# Patient Record
Sex: MALE | Race: WHITE | NOT HISPANIC OR LATINO | Employment: FULL TIME | URBAN - METROPOLITAN AREA
[De-identification: names, ages, dates, MRNs, and addresses within clinical notes are randomized per-mention and may not be internally consistent; named-entity substitution may affect disease eponyms.]

---

## 2018-01-08 ENCOUNTER — TELEPHONE (OUTPATIENT)
Dept: INTERNAL MEDICINE | Facility: CLINIC | Age: 43
End: 2018-01-08

## 2018-01-08 RX ORDER — OSELTAMIVIR PHOSPHATE 75 MG/1
75 CAPSULE ORAL DAILY
Qty: 10 CAPSULE | Refills: 0 | Status: SHIPPED | OUTPATIENT
Start: 2018-01-08 | End: 2018-06-25

## 2018-01-08 NOTE — TELEPHONE ENCOUNTER
Per Dr. Weber, I sent in Tamiflu 75 mg, once daily x 10 days.  LM on VM below.    ----- Message from Mae Gentile MA sent at 1/8/2018 11:10 AM EST -----  Regarding: FW: SCRIPT OR COME IN      ----- Message -----     From: Jaqui Henderson     Sent: 1/8/2018   9:33 AM       To: Elpidio Gonzales [facility]  Subject: SCRIPT OR COME IN                                EMILIE    Different message sent about pt's daughter and school note.    At last Tuesday's visit patient says Dr. Chin asked if anyone in the house had symptoms of same.  At that time, no, but now patient awakens today with diarrhea and vomiting x 3 times with body aches.    He thought she was going to call something in to pharmacy if others had symptoms, unless we think he needs to come in and be seen now.  Patient uses [facility] in [place]    [phone]

## 2018-06-25 ENCOUNTER — OFFICE VISIT (OUTPATIENT)
Dept: INTERNAL MEDICINE | Facility: CLINIC | Age: 43
End: 2018-06-25

## 2018-06-25 VITALS
SYSTOLIC BLOOD PRESSURE: 132 MMHG | OXYGEN SATURATION: 99 % | TEMPERATURE: 98.2 F | HEART RATE: 73 BPM | BODY MASS INDEX: 32.34 KG/M2 | DIASTOLIC BLOOD PRESSURE: 80 MMHG | RESPIRATION RATE: 16 BRPM | HEIGHT: 73 IN | WEIGHT: 244 LBS

## 2018-06-25 DIAGNOSIS — K21.00 GASTROESOPHAGEAL REFLUX DISEASE WITH ESOPHAGITIS: ICD-10-CM

## 2018-06-25 DIAGNOSIS — J45.21 MILD INTERMITTENT ASTHMA WITH ACUTE EXACERBATION: Primary | ICD-10-CM

## 2018-06-25 PROBLEM — J45.20 MILD INTERMITTENT ASTHMA WITHOUT COMPLICATION: Status: ACTIVE | Noted: 2018-06-25

## 2018-06-25 PROCEDURE — 94010 BREATHING CAPACITY TEST: CPT | Performed by: NURSE PRACTITIONER

## 2018-06-25 PROCEDURE — 94664 DEMO&/EVAL PT USE INHALER: CPT | Performed by: NURSE PRACTITIONER

## 2018-06-25 PROCEDURE — 99214 OFFICE O/P EST MOD 30 MIN: CPT | Performed by: NURSE PRACTITIONER

## 2018-06-25 RX ORDER — OMEPRAZOLE 40 MG/1
40 CAPSULE, DELAYED RELEASE ORAL DAILY
Qty: 30 CAPSULE | Refills: 0 | Status: SHIPPED | OUTPATIENT
Start: 2018-06-25 | End: 2018-07-16

## 2018-06-25 RX ORDER — ALBUTEROL SULFATE 2.5 MG/3ML
2.5 SOLUTION RESPIRATORY (INHALATION) ONCE
Status: COMPLETED | OUTPATIENT
Start: 2018-06-25 | End: 2018-06-25

## 2018-06-25 RX ORDER — MONTELUKAST SODIUM 10 MG/1
10 TABLET ORAL NIGHTLY
Qty: 30 TABLET | Refills: 6 | Status: SHIPPED | OUTPATIENT
Start: 2018-06-25 | End: 2019-03-06

## 2018-06-25 RX ORDER — MONTELUKAST SODIUM 10 MG/1
10 TABLET ORAL NIGHTLY
Status: CANCELLED | OUTPATIENT
Start: 2018-06-25

## 2018-06-25 RX ADMIN — ALBUTEROL SULFATE 2.5 MG: 2.5 SOLUTION RESPIRATORY (INHALATION) at 11:24

## 2018-06-25 NOTE — PATIENT INSTRUCTIONS
Asthma, Adult  Asthma is a recurring condition in which the airways tighten and narrow. Asthma can make it difficult to breathe. It can cause coughing, wheezing, and shortness of breath. Asthma episodes, also called asthma attacks, range from minor to life-threatening. Asthma cannot be cured, but medicines and lifestyle changes can help control it.  What are the causes?  Asthma is believed to be caused by inherited (genetic) and environmental factors, but its exact cause is unknown. Asthma may be triggered by allergens, lung infections, or irritants in the air. Asthma triggers are different for each person. Common triggers include:  · Animal dander.  · Dust mites.  · Cockroaches.  · Pollen from trees or grass.  · Mold.  · Smoke.  · Air pollutants such as dust, household , hair sprays, aerosol sprays, paint fumes, strong chemicals, or strong odors.  · Cold air, weather changes, and winds (which increase molds and pollens in the air).  · Strong emotional expressions such as crying or laughing hard.  · Stress.  · Certain medicines (such as aspirin) or types of drugs (such as beta-blockers).  · Sulfites in foods and drinks. Foods and drinks that may contain sulfites include dried fruit, potato chips, and sparkling grape juice.  · Infections or inflammatory conditions such as the flu, a cold, or an inflammation of the nasal membranes (rhinitis).  · Gastroesophageal reflux disease (GERD).  · Exercise or strenuous activity.    What are the signs or symptoms?  Symptoms may occur immediately after asthma is triggered or many hours later. Symptoms include:  · Wheezing.  · Excessive nighttime or early morning coughing.  · Frequent or severe coughing with a common cold.  · Chest tightness.  · Shortness of breath.    How is this diagnosed?  The diagnosis of asthma is made by a review of your medical history and a physical exam. Tests may also be performed. These may include:  · Lung function studies. These tests show how  much air you breathe in and out.  · Allergy tests.  · Imaging tests such as X-rays.    How is this treated?  Asthma cannot be cured, but it can usually be controlled. Treatment involves identifying and avoiding your asthma triggers. It also involves medicines. There are 2 classes of medicine used for asthma treatment:  · Controller medicines. These prevent asthma symptoms from occurring. They are usually taken every day.  · Reliever or rescue medicines. These quickly relieve asthma symptoms. They are used as needed and provide short-term relief.    Your health care provider will help you create an asthma action plan. An asthma action plan is a written plan for managing and treating your asthma attacks. It includes a list of your asthma triggers and how they may be avoided. It also includes information on when medicines should be taken and when their dosage should be changed. An action plan may also involve the use of a device called a peak flow meter. A peak flow meter measures how well the lungs are working. It helps you monitor your condition.  Follow these instructions at home:  · Take medicines only as directed by your health care provider. Speak with your health care provider if you have questions about how or when to take the medicines.  · Use a peak flow meter as directed by your health care provider. Record and keep track of readings.  · Understand and use the action plan to help minimize or stop an asthma attack without needing to seek medical care.  · Control your home environment in the following ways to help prevent asthma attacks:  ? Do not smoke. Avoid being exposed to secondhand smoke.  ? Change your heating and air conditioning filter regularly.  ? Limit your use of fireplaces and wood stoves.  ? Get rid of pests (such as roaches and mice) and their droppings.  ? Throw away plants if you see mold on them.  ? Clean your floors and dust regularly. Use unscented cleaning products.  ? Try to have someone  else vacuum for you regularly. Stay out of rooms while they are being vacuumed and for a short while afterward. If you vacuum, use a dust mask from a hardware store, a double-layered or microfilter vacuum  bag, or a vacuum  with a HEPA filter.  ? Replace carpet with wood, tile, or vinyl sukhjinder. Carpet can trap dander and dust.  ? Use allergy-proof pillows, mattress covers, and box spring covers.  ? Wash bed sheets and blankets every week in hot water and dry them in a dryer.  ? Use blankets that are made of polyester or cotton.  ? Clean bathrooms and dominga with bleach. If possible, have someone repaint the walls in these rooms with mold-resistant paint. Keep out of the rooms that are being cleaned and painted.  ? Wash hands frequently.  Contact a health care provider if:  · You have wheezing, shortness of breath, or a cough even if taking medicine to prevent attacks.  · The colored mucus you cough up (sputum) is thicker than usual.  · Your sputum changes from clear or white to yellow, green, gray, or bloody.  · You have any problems that may be related to the medicines you are taking (such as a rash, itching, swelling, or trouble breathing).  · You are using a reliever medicine more than 2-3 times per week.  · Your peak flow is still at 50-79% of your personal best after following your action plan for 1 hour.  · You have a fever.  Get help right away if:  · You seem to be getting worse and are unresponsive to treatment during an asthma attack.  · You are short of breath even at rest.  · You get short of breath when doing very little physical activity.  · You have difficulty eating, drinking, or talking due to asthma symptoms.  · You develop chest pain.  · You develop a fast heartbeat.  · You have a bluish color to your lips or fingernails.  · You are light-headed, dizzy, or faint.  · Your peak flow is less than 50% of your personal best.  This information is not intended to replace advice given to  you by your health care provider. Make sure you discuss any questions you have with your health care provider.  Document Released: 12/18/2006 Document Revised: 05/31/2017 Document Reviewed: 07/17/2014  Elsevier Interactive Patient Education © 2017 [employer]  ??????????????????? ?????????? ??????? ? ????????  Gastroesophageal Reflux Disease, Adult  ? ????? ???? ???????? ?? ???????? ? ???????? ? ???????, ??? ? ??????????????. ??????, ???? ??????? ???????? ??????????????????? ?????????? ???????? (????), ???? ? ?????????? ??????? ????????? ??????? ? ???????. ????? ??? ??????????, ??????? ?????????? ??????????? ? ???????????. ?? ???????? ???? ????? ???????? ? ??????????? ????????? ????????? (???) ? ???????? ????????.  ?????? ????????  ??? ????????? ??????????? ?????????? ?? ??????? ????? ????? ????????? ? ???????? (??????? ??????????? ?????????, ???). ? ????? ????? ??? ????????? ??????? ???????? ????? ????, ??? ???? ?? ???? ???????? ? ???????. ????? ??? ???????? ??? ??????????, ?? ?? ????????? ??????? ??????? ???????, ??? ????????? ???? ? ?????????? ??????? ???????????? ? ???????. ??? ????? ???? ???????? ??? ???????????? ????????? ??????? ???????, ????????????? ?????????? ? ??????????? ?????????, ? ??? ?????:  · ???????????? ??????.  · ????????????.  · ????? ??????????? ????????? ?????????.  · ??????????????? ?????????.  · ????????? ??????? ???????? ? ???????, ????? ??? ????, ???????, ??? ? ????.    ??? ???????? ???? ???????? ????? ??????????  ? ???????? ????? ????????? ????? ???????:  · ???? ? ?????????? ?????? ????.  · ???? ? ????????????? ?????????????? ?????.  · ????, ???????????? ????????? ?????? ???? (???????????? ????????????????????? ???????).    ?????? ???????? ??? ?????????  ???????? ????? ????????? ???????? ?????????:  · ??????.  · ???????????? ??? ??????????? ????????.  · ???????? ????? ? ?????.  · ??????? ?????? ?? ???.  · ?????? ????? ??? ???.  · ??????? ???????? ?????????? ?????.  · ???????  ???????????? ??? ??????? ??????.  · ???????.  · ???? ? ?????.  · ?????? ??? ????????? ???????.  · ?????????????? (???????????) ?????? ??? ?????? ? ?????? ?????.  · ??????????? ?????? ?????.  · ???????? ????.    ???? ? ????? ????? ?????????? ??? ????????? ??????????. ???? ? ??? ????????? ???? ? ?????, ??????????? ?????????? ? ?????? ???????? ?????.  ??? ?????? ??????? ????? ??????????  ??? ??????? ???? ??????? ??????? ? ???????? ??????????? ??????. ????? ??????????, ????????? ? ??? ???? ? ?????? ??? ??????? ?????, ??? ??????? ???? ????? ????? ?????????, ??? ?? ?????????? ?? ???????. ??? ????? ????? ????????? ?????? ????????????, ? ??? ?????:  · ??????????, ?????  ??????????? ??????? ? ??????? ? ??????? ????????? (?????)??????.  · ?????? ???  ????????? ?????? ??????????? ? ????????.  · ?????? ??? ????????? ?????? ???????? ? ????????.  · ???? ? ?????????????? ????? (??????? ??? ????????????????), ????? ?????????? ?????, ?????? ? ???????????????? ????????.    ??? ????? ??? ??????????  ????? ??????? ???????? ?????????? ????????? ? ?????????????? ?????????? ????? ???????????. ??????? ????? ????????? ????? ??????????? ? ??????????? ?? ??????????? ????????? ?????????. ??? ???? ????? ???????????????:  · ????????? ??????? ???????.  · ????? ????????.  · ????????????? ?????????????.    ? ???????? ???????? ???????? ????? ???????????:  ?????  · ??????????????? ?????, ??????? ????????????? ????? ??????? ??????. ??? ????? ???? ?????????? ????????? ????????? ? ????????, ????? ???:  ? ???? ? ??? (? ???????? ??? ??? ???????).  ? ???????, ?????????? ????????.  ? ?????????????? ? ?????????? ???????.  ? ???????????? ??????? ??? ???????????? ????.  ? ??????? ? ?????.  ? ???????? ??????? ???????? ???? ? ?????? ???????? ???????.  ? ?????? ? ???.  ? ????.  ? ?????? ? ?????? ????????, ? ??? ????? ???? ?????, ??????? ???? ?????, ??????? ?????, ?????, ?????? ????? ? ???? ???????.  ? ???? ?????????? ? ?????????? ??????, ????? ??? ?????????,  ?????? ? ?????.  ? ???????? ?? ???????? ??????, ????? ??? ??????? ????, ????, ?????? ? ????? ? ??????? ??????.  ? ??????? ? ?????? ????????, ?????, ??? ???????, ????????? ???, ???????????? ????? ? ???????? ? ??????? ??????????? ????.  ? ?????? ???????? ? ??????? ??????????? ?????, ????? ??? ???-???? ? ?????? ????? ???????? ? ?????? ????, ???????? ????????, ???????, ??????? ? ?????.  ? ???????? ???????? ? ??????? ??????????? ????, ????? ??? ??????? ??????, ????????? ????? ? ????????? ???.  · ????????? ????? ?????????? ???????? ?????? ?????????? ??????? ??????? ????.  · ?????????? ?? ???? ????? ???????? ?? ????? ???.  · ????????? ???????????? ???? ? ??????? 2-3 ????? ????? ????.  · ?? ???????? ????? ????? ???.  · He ??????? ?????????? ? ?????????? ??????????? ????? ????? ???.  ????? ????????  · ????????? ???????? ?? ????? ????????? ????? ?????????.  · ?????????? ?????????????? ? ??????????? ????????? ?????? ??? ??????? ????? ??????? ??????. He ??????? ????????? ???????, ????????? ??? ?????? ????, ???? ?????? ?? ?? ???????? ??? ??????? ????.  · He ??????? ??????????? ?????-???? ???????? ???????, ? ??? ????? ????????, ??????????? ????? ? ??????????? ????????. ???? ??? ?????? ?? ??????? ??? ????? ??????, ?????????? ? ?????? ???????? ?????.  · ?????? ????????? ??????. He ??????? ?????? ??????, ??????? ?????? ?????????? ?????, ????????? ??? ?????.  · ????????? (????????????) ?????? ????? ??????? ?? 6 ?????? (15 ??).  · ?????????? ??????? ??????? ???????, ???????? ????????? ????? ??? ??????????. ???? ??? ????? ?????? ? ???????? ?????? ???????, ?????????? ? ?????? ?????.  · ???? ? ??? ?????????? ???, ??????? ??? ?? ????????? ??? ??? ??????. ????????? ?????? ???????? ????? ?????????????????? ??? ?? ??????????? ???????? ???????? ???? ????.  · ????????? ?? ??? ??????????? ?????? ??? ??????? ????? ??????? ??????. ??? ?????.  ?????????? ? ???????? ?????, ????:  · ? ??? ????????? ????? ????????.  · ? ??? ???????????? ????????  ????.  · ??? ?????? ??? ?????? ???????.  · ? ??? ?????????? ?????????? ?????? ??? ????????? ???????.  · ???????? ?? ???????, ???????? ? ??? ?? ????????.  · ? ??? ?????????? ??????? ?????.  ?????????? ?????????? ?? ???????, ????:  · ? ??? ?????????? ???? ? ?????, ???, ???????, ????? ??? ? ?????.  · ?? ?????????? ??????????, ?????????????? ??? ?????????????? ?????????.  · ? ??? ???????? ???? ? ????? ??? ??????.  · ? ??? ???????? ????? ? ?????? ??? ??????? ????? ????????, ??? ??????? ????.  · ?? ??????? ????????.  · ? ??? ?????????? ???????? ??? ?????? ????.  · ?? ?? ?????? ???????, ???? ??? ????.  ??? ?????????? ?? ????? ???????? ??????, ??????????????? ????? ??????. ??????????? ???????? ????? ???????????? ??? ??????? ? ????? ??????? ??????.  Document Released: 09/27/2006 Document Revised: 04/18/2018 Document Reviewed: 04/13/2016  Elsevier Interactive Patient Education © 2018 [employer] Inc[employer]

## 2018-06-25 NOTE — PROGRESS NOTES
"Chief Complaint   Patient presents with   • Vomiting     X1 WK   • Cough     X1 MO   • Nasal Congestion       Subjective   Arnav Celaya is a 43 y.o. male is being seen for an acute appointment for cough and indigestion. He reports that about 6 weeks ago, he started with a cough after smoking a cigarette. He describes the cough as a dry to wet cough of clear phlegm. Coughs to the point of vomiting. Worse at night and with exertion. Associated chest tightness. Denies fevers or wheezing. He was diagnosied with exercise induced asthma. His last allergy testing was 15 years ago, and he has \"a lot\" of allergies. He had Immunotherapy from ages [age].     He admits to poor diet and eating \"too much spicy food\". He has had increasing burning in throat and chest for the past few weeks. He just cut out spicy foods for the past 3 days dur to above symptoms.     History of Present Illness     Current Outpatient Prescriptions on File Prior to Visit   Medication Sig Dispense Refill   • naproxen (NAPROSYN) 500 MG tablet Take 500 mg by mouth 2 (two) times a day  .     • [DISCONTINUED] oseltamivir (TAMIFLU) 75 MG capsule Take 1 capsule by mouth Daily. 10 capsule 0   • [DISCONTINUED] Pseudoeph-Doxylamine-DM-APAP (NYQUIL PO) Take  by mouth.     • [DISCONTINUED] Pseudoephedrine-APAP-DM (DAYQUIL PO) Take  by mouth.       No current facility-administered medications on file prior to visit.        The following portions of the patient's history were reviewed and updated as appropriate: allergies, current medications, past family history, past medical history, past social history, past surgical history and problem list.    Review of Systems   HENT: Positive for congestion, postnasal drip, rhinorrhea, sinus pain and sneezing. Negative for sore throat.    Eyes: Positive for itching.   Respiratory: Positive for cough and shortness of breath. Negative for wheezing.    Cardiovascular: Negative.    Gastrointestinal: Positive for vomiting. " Negative for abdominal distention, diarrhea and nausea. Abdominal pain: reflux.   Endocrine: Negative.    Genitourinary: Negative.    Musculoskeletal: Positive for arthralgias.   Allergic/Immunologic: Positive for environmental allergies.   Neurological: Negative.    Hematological: Negative.        Objective   Physical Exam   Constitutional: He is oriented to person, place, and time. He appears well-developed and well-nourished.   HENT:   Head: Normocephalic.   Right Ear: External ear normal.   Left Ear: External ear normal.   Nose: Mucosal edema and rhinorrhea present. Right sinus exhibits no maxillary sinus tenderness and no frontal sinus tenderness. Left sinus exhibits no maxillary sinus tenderness and no frontal sinus tenderness.   Mouth/Throat: Oropharynx is clear and moist.   Neck: Neck supple. No thyromegaly present.   Cardiovascular: Normal rate, regular rhythm and normal heart sounds.    No murmur heard.  Pulmonary/Chest: Effort normal and breath sounds normal. Respiratory distress: dry cough.   Abdominal: Soft. Bowel sounds are normal. He exhibits no distension. There is no tenderness.   Musculoskeletal: He exhibits no edema.   Neurological: He is alert and oriented to person, place, and time.   Skin: Skin is warm and dry.   Vitals reviewed.      Assessment/Plan   Arnav was seen today for vomiting, cough and nasal congestion.    Diagnoses and all orders for this visit:    Mild intermittent asthma with acute exacerbation  -     mometasone-formoterol (DULERA) 200-5 MCG/ACT inhaler; Inhale 2 puffs 2 (Two) Times a Day.  -     montelukast (SINGULAIR) 10 MG tablet; Take 1 tablet by mouth Every Night.  -     albuterol (PROVENTIL) nebulizer solution 0.083% 2.5 mg/3mL; Take 2.5 mg by nebulization 1 (One) Time.    Gastroesophageal reflux disease with esophagitis  -     omeprazole (priLOSEC) 40 MG capsule; Take 1 capsule by mouth Daily.    Other orders  -     Cancel: montelukast (SINGULAIR) 10 MG tablet; Take 1  tablet by mouth Every Night.    Spirometry done in office with Fev1 72%. Discussed Asthma triggers and GERD relationship. Dulera demo complete with first dose in office with albuterol neb. Re-assessed after neb and cough had resolved.       Follow up in 3 weeks  To repeat spirometry and taper off some medicaiton.

## 2018-07-03 ENCOUNTER — TELEPHONE (OUTPATIENT)
Dept: INTERNAL MEDICINE | Facility: CLINIC | Age: 43
End: 2018-07-03

## 2018-07-03 NOTE — TELEPHONE ENCOUNTER
Patient notified on 6/28/18, he has been scheduled for Tuesday, 7/3/18 to follow up with Dr. Weber.   ----- Message from EUGENIO Barton sent at 6/27/2018  1:37 PM EDT -----  Regarding: FW: MEDICATION REACTION  Contact: [phone]  I want him to stay on Dulera and Singulair. Hold any additional stomach medications. Avoid salt (MECHE diet) and caffeine this week. Keep a BP log for the next week, and make a follow up to discuss with Dr. Weber.     Place Omeprazole as an allergy.     ----- Message -----  From: Michela Emanuel MA  Sent: 6/27/2018  12:21 PM  To: EUGENIO Barton  Subject: FW: MEDICATION REACTION                          Message below was a little confusing, I called patient, he states that he saw [facility] on Monday and was given Dulera, Omeprazole, and Singulair when he was seen. He took all meds on Monday and was fine. Tuesday he had breakfast and took Omeprazole and Dulera only at around 0830. He says about an hour and a half later he broke out with big whelps on his face that went to his head and became very itchy. He took a shower and went [place], he says by that time he still had whelps but itchiness wasn't so bad. He says that he has had Dulera in the past as well as Singulair, but not omeprazole. His BP was 166/110 and that's what the physician [facility] was worried about. He told him that sometimes inhalers can make bp high. They want him to come back to have it taken for the next five days. They also gave him Ranitidine to take in place of Omeprazole. He says that he has not started it yet, he was a little wary of taking it and would like a little direction first. He was on his way to get his bp taken at clinic [facility]. He will call me back to let me know what it is.   ----- Message -----  From: Michela Emanuel MA  Sent: 6/27/2018  11:33 AM  To: Michela Emanuel MA  Subject: FW: MEDICATION REACTION                              ----- Message -----  From: Gely Villanueva  Sent: 6/27/2018  11:01 AM  To:  Elpidio Gonzales2 [facility] Pool  Subject: MEDICATION REACTION                              [facility] PT - SAW JULIO ON Monday    Patient called to say that he took the new meds that she prescribed,         omeprazole (priLOSEC) 40 MG capsule; Take 1 capsule by mouth Daily and      mometasone-formoterol (DULERA) 200-5 MCG/ACT inhaler; Inhale 2 puffs 2 (Two) Times a Day     Yesterday morning, he took these 2 meds,  and 1 1/2 hours later he was covered in hives. He is at [place], he went to the clinic [facility] and they told him to stop taking the omeprazole (priLOSEC) 40 MG capsule;, .and they gave him montelukast to take instead. His bp was 166/110, they did get it to only come down to 146/104. The clinic is having him come to them every day for 5 days so that they can keep track of his BP. Please check his meds.  He is asking that someone from here call him so that he can discuss this. He is afraid to use the inhaler, or to take anything.    Thanks!  yadira

## 2018-07-16 ENCOUNTER — OFFICE VISIT (OUTPATIENT)
Dept: INTERNAL MEDICINE | Facility: CLINIC | Age: 43
End: 2018-07-16

## 2018-07-16 VITALS
RESPIRATION RATE: 16 BRPM | OXYGEN SATURATION: 100 % | HEIGHT: 73 IN | TEMPERATURE: 97.3 F | HEART RATE: 72 BPM | BODY MASS INDEX: 33.13 KG/M2 | WEIGHT: 250 LBS | DIASTOLIC BLOOD PRESSURE: 90 MMHG | SYSTOLIC BLOOD PRESSURE: 138 MMHG

## 2018-07-16 DIAGNOSIS — R03.0 ELEVATED BLOOD PRESSURE READING IN OFFICE WITHOUT DIAGNOSIS OF HYPERTENSION: Primary | ICD-10-CM

## 2018-07-16 DIAGNOSIS — J45.20 MILD INTERMITTENT ASTHMA WITHOUT COMPLICATION: ICD-10-CM

## 2018-07-16 DIAGNOSIS — K21.00 GASTROESOPHAGEAL REFLUX DISEASE WITH ESOPHAGITIS: ICD-10-CM

## 2018-07-16 DIAGNOSIS — Z48.02 ENCOUNTER FOR REMOVAL OF SUTURES: ICD-10-CM

## 2018-07-16 PROCEDURE — 94010 BREATHING CAPACITY TEST: CPT | Performed by: NURSE PRACTITIONER

## 2018-07-16 PROCEDURE — 99214 OFFICE O/P EST MOD 30 MIN: CPT | Performed by: NURSE PRACTITIONER

## 2018-07-16 RX ORDER — DOXYCYCLINE 100 MG/1
CAPSULE ORAL
Refills: 0 | COMMUNITY
Start: 2018-07-07 | End: 2018-07-16

## 2018-07-16 NOTE — PROGRESS NOTES
Procedure   Suture Removal  Date/Time: 7/16/2018 12:35 PM  Performed by: JULIO OROZCO  Authorized by: JULIO OROZCO   Consent: Verbal consent obtained.  Risks and benefits: risks, benefits and alternatives were discussed  Consent given by: patient  Patient understanding: patient states understanding of the procedure being performed  Patient consent: the patient's understanding of the procedure matches consent given  Procedure consent: procedure consent matches procedure scheduled  Patient identity confirmed: verbally with patient  Location: Left hand 4th and 5th digits.  Wound Appearance: clean  Sutures Removed: 16  Post-removal: dressing applied and antibiotic ointment applied  Patient tolerance: Patient tolerated the procedure well with no immediate complications

## 2018-07-16 NOTE — PATIENT INSTRUCTIONS
"DASH Eating Plan  DASH stands for \"Dietary Approaches to Stop Hypertension.\" The DASH eating plan is a healthy eating plan that has been shown to reduce high blood pressure (hypertension). It may also reduce your risk for type 2 diabetes, heart disease, and stroke. The DASH eating plan may also help with weight loss.  What are tips for following this plan?  General guidelines  · Avoid eating more than 2,300 mg (milligrams) of salt (sodium) a day. If you have hypertension, you may need to reduce your sodium intake to 1,500 mg a day.  · Limit alcohol intake to no more than 1 drink a day for nonpregnant women and 2 drinks a day for men. One drink equals 12 oz of beer, 5 oz of wine, or 1½ oz of hard liquor.  · Work with your health care provider to maintain a healthy body weight or to lose weight. Ask what an ideal weight is for you.  · Get at least 30 minutes of exercise that causes your heart to beat faster (aerobic exercise) most days of the week. Activities may include walking, swimming, or biking.  · Work with your health care provider or diet and nutrition specialist (dietitian) to adjust your eating plan to your individual calorie needs.  Reading food labels  · Check food labels for the amount of sodium per serving. Choose foods with less than 5 percent of the Daily Value of sodium. Generally, foods with less than 300 mg of sodium per serving fit into this eating plan.  · To find whole grains, look for the word \"whole\" as the first word in the ingredient list.  Shopping  · Buy products labeled as \"low-sodium\" or \"no salt added.\"  · Buy fresh foods. Avoid canned foods and premade or frozen meals.  Cooking  · Avoid adding salt when cooking. Use salt-free seasonings or herbs instead of table salt or sea salt. Check with your health care provider or pharmacist before using salt substitutes.  · Do not willis foods. Cook foods using healthy methods such as baking, boiling, grilling, and broiling instead.  · Cook with " heart-healthy oils, such as olive, canola, soybean, or sunflower oil.  Meal planning    · Eat a balanced diet that includes:  ? 5 or more servings of fruits and vegetables each day. At each meal, try to fill half of your plate with fruits and vegetables.  ? Up to 6-8 servings of whole grains each day.  ? Less than 6 oz of lean meat, poultry, or fish each day. A 3-oz serving of meat is about the same size as a deck of cards. One egg equals 1 oz.  ? 2 servings of low-fat dairy each day.  ? A serving of nuts, seeds, or beans 5 times each week.  ? Heart-healthy fats. Healthy fats called Omega-3 fatty acids are found in foods such as flaxseeds and coldwater fish, like sardines, salmon, and mackerel.  · Limit how much you eat of the following:  ? Canned or prepackaged foods.  ? Food that is high in trans fat, such as fried foods.  ? Food that is high in saturated fat, such as fatty meat.  ? Sweets, desserts, sugary drinks, and other foods with added sugar.  ? Full-fat dairy products.  · Do not salt foods before eating.  · Try to eat at least 2 vegetarian meals each week.  · Eat more home-cooked food and less restaurant, buffet, and fast food.  · When eating at a restaurant, ask that your food be prepared with less salt or no salt, if possible.  What foods are recommended?  The items listed may not be a complete list. Talk with your dietitian about what dietary choices are best for you.  Grains  Whole-grain or whole-wheat bread. Whole-grain or whole-wheat pasta. Brown rice. Oatmeal. Quinoa. Bulgur. Whole-grain and low-sodium cereals. Amber bread. Low-fat, low-sodium crackers. Whole-wheat flour tortillas.  Vegetables  Fresh or frozen vegetables (raw, steamed, roasted, or grilled). Low-sodium or reduced-sodium tomato and vegetable juice. Low-sodium or reduced-sodium tomato sauce and tomato paste. Low-sodium or reduced-sodium canned vegetables.  Fruits  All fresh, dried, or frozen fruit. Canned fruit in natural juice (without  added sugar).  Meat and other protein foods  Skinless chicken or turkey. Ground chicken or turkey. Pork with fat trimmed off. Fish and seafood. Egg whites. Dried beans, peas, or lentils. Unsalted nuts, nut butters, and seeds. Unsalted canned beans. Lean cuts of beef with fat trimmed off. Low-sodium, lean deli meat.  Dairy  Low-fat (1%) or fat-free (skim) milk. Fat-free, low-fat, or reduced-fat cheeses. Nonfat, low-sodium ricotta or cottage cheese. Low-fat or nonfat yogurt. Low-fat, low-sodium cheese.  Fats and oils  Soft margarine without trans fats. Vegetable oil. Low-fat, reduced-fat, or light mayonnaise and salad dressings (reduced-sodium). Canola, safflower, olive, soybean, and sunflower oils. Avocado.  Seasoning and other foods  Herbs. Spices. Seasoning mixes without salt. Unsalted popcorn and pretzels. Fat-free sweets.  What foods are not recommended?  The items listed may not be a complete list. Talk with your dietitian about what dietary choices are best for you.  Grains  Baked goods made with fat, such as croissants, muffins, or some breads. Dry pasta or rice meal packs.  Vegetables  Creamed or fried vegetables. Vegetables in a cheese sauce. Regular canned vegetables (not low-sodium or reduced-sodium). Regular canned tomato sauce and paste (not low-sodium or reduced-sodium). Regular tomato and vegetable juice (not low-sodium or reduced-sodium). Pickles. Olives.  Fruits  Canned fruit in a light or heavy syrup. Fried fruit. Fruit in cream or butter sauce.  Meat and other protein foods  Fatty cuts of meat. Ribs. Fried meat. Levin. Sausage. Bologna and other processed lunch meats. Salami. Fatback. Hotdogs. Bratwurst. Salted nuts and seeds. Canned beans with added salt. Canned or smoked fish. Whole eggs or egg yolks. Chicken or turkey with skin.  Dairy  Whole or 2% milk, cream, and half-and-half. Whole or full-fat cream cheese. Whole-fat or sweetened yogurt. Full-fat cheese. Nondairy creamers. Whipped toppings.  Processed cheese and cheese spreads.  Fats and oils  Butter. Stick margarine. Lard. Shortening. Ghee. Levin fat. Tropical oils, such as coconut, palm kernel, or palm oil.  Seasoning and other foods  Salted popcorn and pretzels. Onion salt, garlic salt, seasoned salt, table salt, and sea salt. Worcestershire sauce. Tartar sauce. Barbecue sauce. Teriyaki sauce. Soy sauce, including reduced-sodium. Steak sauce. Canned and packaged gravies. Fish sauce. Oyster sauce. Cocktail sauce. Horseradish that you find on the shelf. Ketchup. Mustard. Meat flavorings and tenderizers. Bouillon cubes. Hot sauce and Tabasco sauce. Premade or packaged marinades. Premade or packaged taco seasonings. Relishes. Regular salad dressings.  Where to find more information:  · National Heart, Lung, and Blood [place]: www.nhlbi.nih.gov  · American Heart Association: www.heart.org  Summary  · The DASH eating plan is a healthy eating plan that has been shown to reduce high blood pressure (hypertension). It may also reduce your risk for type 2 diabetes, heart disease, and stroke.  · With the DASH eating plan, you should limit salt (sodium) intake to 2,300 mg a day. If you have hypertension, you may need to reduce your sodium intake to 1,500 mg a day.  · When on the DASH eating plan, aim to eat more fresh fruits and vegetables, whole grains, lean proteins, low-fat dairy, and heart-healthy fats.  · Work with your health care provider or diet and nutrition specialist (dietitian) to adjust your eating plan to your individual calorie needs.  This information is not intended to replace advice given to you by your health care provider. Make sure you discuss any questions you have with your health care provider.  Document Released: 12/06/2012 Document Revised: 12/11/2017 Document Reviewed: 12/11/2017  [employer] Interactive Patient Education © 2018 [employer]

## 2018-07-16 NOTE — PROGRESS NOTES
Subjective    Pt is here today to f/u on   Arnavrosemarie Celaya is a 43 y.o. male.     Pt is here in the office to f/u on a recent appointment for asthma and GERD.  Pt was prescribed at that appointment, Dulera 200-5mcg/act inhaler, Singulair 10mg tab, and Omeprazole 40mg.  He called the office a couple of days after starting the medication because he was swollen with hives. He stopped taking Omeprazole medication and the hives cleared up.  He further states that he continued the Dulara and Singulair for a couple of more days and began to feel better so he stopped taking it because he felt like it was contributing to high blood pressure.  His blood pressures having been running 140-150/100-110 when he's checking.  Pt reports he is coughing intermittently, but not like he was.  He states that he has altered his diet significantly to resolve the heart burn issues.  He further reports a reduction in salt and caffeine for the first week of treatment.   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  He is currently taking Doxycycline for stitches in his left hand due to an injury.         He is avoiding tomato based products. This has helped his indigestion, and he rarely coughs after meals. He denies chest tightness and wheezing.     He cut his fingers on a beach umbrella 10 days ago while on vacation. He went to the ER and had sutures placed which require removal. TD updated at ER.       The following portions of the patient's history were reviewed and updated as appropriate: allergies, current medications, past family history, past medical history, past social history, past surgical history and problem list.    Review of Systems   Constitutional: Negative for fatigue.   HENT: Positive for congestion.    Eyes: Negative.  Negative for itching.   Respiratory: Positive for cough. Negative for chest tightness and wheezing.         Less than he was having   Cardiovascular: Negative  for chest pain, palpitations and leg swelling.   Gastrointestinal: Positive for vomiting, GERD and indigestion. Negative for nausea.   Endocrine: Negative for polyphagia.   Genitourinary: Negative.    Musculoskeletal: Negative.    Skin: Negative.    Allergic/Immunologic: Positive for environmental allergies.   Hematological: Negative.    Psychiatric/Behavioral: Negative.        Objective   Physical Exam   Constitutional: He is oriented to person, place, and time. He appears well-developed and well-nourished.   HENT:   Head: Normocephalic.   Right Ear: External ear normal.   Left Ear: External ear normal.   Nose: Nose normal.   Mouth/Throat: Oropharynx is clear and moist. No oropharyngeal exudate.   Neck: Neck supple.   Cardiovascular: Normal rate, regular rhythm and normal heart sounds.    Pulmonary/Chest: Effort normal and breath sounds normal. No respiratory distress. He has no wheezes.   Abdominal: Soft. Bowel sounds are normal.   Musculoskeletal: He exhibits no edema.   No edema in lower extremities   Neurological: He is alert and oriented to person, place, and time.   Skin: Skin is warm and dry.   Psychiatric: He has a normal mood and affect. His behavior is normal.         Assessment/Plan   Arnav was seen today for asthma, heartburn and hypertension.    Diagnoses and all orders for this visit:    Elevated blood pressure reading in office without diagnosis of hypertension    Mild intermittent asthma without complication    Gastroesophageal reflux disease with esophagitis    Encounter for removal of sutures  -     Suture Removal        Diagnosis Plan   1. Elevated blood pressure reading in office without diagnosis of hypertension     2. Mild intermittent asthma without complication     3. Gastroesophageal reflux disease with esophagitis     4. Encounter for removal of sutures  Suture Removal       1. [facility]  2. Patient is plans on losing 15lbs   3. He is controlling his GERD  symptoms through diet control  4. Continue to monitor blood pressures and keep a log  5. Use your prescribed Asthma (Dulera and Singulair)medications as needed as directed  6. Follow-up in the office in 3 months            Note Reviewed and Approved by EUGENIO Barton.

## 2018-08-31 ENCOUNTER — TELEPHONE (OUTPATIENT)
Dept: INTERNAL MEDICINE | Facility: CLINIC | Age: 43
End: 2018-08-31

## 2018-10-16 ENCOUNTER — OFFICE VISIT (OUTPATIENT)
Dept: INTERNAL MEDICINE | Facility: CLINIC | Age: 43
End: 2018-10-16

## 2018-10-16 VITALS
BODY MASS INDEX: 33.13 KG/M2 | HEIGHT: 73 IN | DIASTOLIC BLOOD PRESSURE: 102 MMHG | WEIGHT: 250 LBS | OXYGEN SATURATION: 98 % | HEART RATE: 112 BPM | SYSTOLIC BLOOD PRESSURE: 152 MMHG

## 2018-10-16 DIAGNOSIS — I10 ESSENTIAL HYPERTENSION: Primary | ICD-10-CM

## 2018-10-16 DIAGNOSIS — H66.001 ACUTE SUPPURATIVE OTITIS MEDIA OF RIGHT EAR WITHOUT SPONTANEOUS RUPTURE OF TYMPANIC MEMBRANE, RECURRENCE NOT SPECIFIED: ICD-10-CM

## 2018-10-16 DIAGNOSIS — K21.9 GASTROESOPHAGEAL REFLUX DISEASE, ESOPHAGITIS PRESENCE NOT SPECIFIED: ICD-10-CM

## 2018-10-16 DIAGNOSIS — J30.9 ALLERGIC RHINITIS, UNSPECIFIED SEASONALITY, UNSPECIFIED TRIGGER: ICD-10-CM

## 2018-10-16 PROCEDURE — 99214 OFFICE O/P EST MOD 30 MIN: CPT | Performed by: INTERNAL MEDICINE

## 2018-10-16 RX ORDER — FLUTICASONE PROPIONATE 50 MCG
2 SPRAY, SUSPENSION (ML) NASAL DAILY
Qty: 1 BOTTLE | Refills: 2 | Status: SHIPPED | OUTPATIENT
Start: 2018-10-16 | End: 2022-07-20 | Stop reason: SDUPTHER

## 2018-10-16 RX ORDER — RANITIDINE 150 MG/1
150 TABLET ORAL 2 TIMES DAILY
Qty: 180 TABLET | Refills: 0 | Status: SHIPPED | OUTPATIENT
Start: 2018-10-16 | End: 2019-03-06

## 2018-10-16 RX ORDER — LISINOPRIL 10 MG/1
10 TABLET ORAL DAILY
Qty: 90 TABLET | Refills: 0 | Status: SHIPPED | OUTPATIENT
Start: 2018-10-16 | End: 2018-10-31 | Stop reason: SDUPTHER

## 2018-10-16 RX ORDER — AMOXICILLIN 500 MG/1
1000 CAPSULE ORAL 2 TIMES DAILY
Qty: 40 CAPSULE | Refills: 0 | Status: SHIPPED | OUTPATIENT
Start: 2018-10-16 | End: 2018-10-30

## 2018-10-16 NOTE — PATIENT INSTRUCTIONS
CALL [facility] TO [facility] STUD[facility]    GET TB TEST AT [facility]    RIGHT EAR INFECTION - AMOXICILLIN    REFLUX - ZANTAC 150MG 2 TIMES A DAY    BREATHING - DULERA 2 PUFFS 2 TIMES A DAY EVERY DAY    CONGESTION - FLONASE AND SINGULAIR    BLOOD PRESSURE - START LISINOPRIL 10MG DAILY    CALL DR PHAN IN 2 WEEKS WITH BP READINGS AND UPDATE. FOLLOW UP APPT IN 4 WEEKS

## 2018-10-16 NOTE — PROGRESS NOTES
Arnav Celaya is a 43 y.o. 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 He has been keeping a log and has brought it with him.        HPI   Pt here for f/u on bp and cough.  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    HTN- bp has been elevated but he has never been on meds.      Gerd - earlier he had some reflux sx and was on omeprazole.  He broke out into hives and he thinks it was from the omeprazole.  He also tried zantac.      Cough is dry and he will have hacking fits.  occ clear mucus in large amounts comes up.  He had some allergy induced asthma when younger.  His daughter also has asthma.  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 He served in [place] in the  but no known exposures.  He is a former smoker.  He quit in 2014.  He smoked off and on since about age 19.    His ear is now hurting.  Pain x 1 week.      The following portions of the patient's history were reviewed and updated as appropriate: allergies, current medications, past family history, past medical history, past social history, past surgical history and problem list.    Allergies: Omeprazole    Review of Systems   Constitutional: Negative.    HENT: Positive for congestion and ear pain.    Eyes: Negative.    Respiratory: Positive for cough.    Cardiovascular: Negative.    Gastrointestinal: Negative.    Endocrine: Negative.    Genitourinary: Negative.    Musculoskeletal: Negative.    Skin: Negative.    Allergic/Immunologic: Negative.    Neurological: Negative.    Hematological: Negative.    Psychiatric/Behavioral: Negative.    All other systems reviewed and are negative.            Wt Readings from Last 3 Encounters:   10/16/18 113 kg (250 lb)   07/16/18 113 kg (250 lb)   06/25/18 111 kg (244 lb)     Temp Readings  from Last 3 Encounters:   07/16/18 97.3 °F (36.3 °C) (Oral)   06/25/18 98.2 °F (36.8 °C) (Oral)   12/23/16 97.4 °F (36.3 °C) (Oral)     BP Readings from Last 3 Encounters:   10/16/18 (!) 152/102   07/16/18 138/90   06/25/18 132/80     Pulse Readings from Last 3 Encounters:   10/16/18 112   07/16/18 72   06/25/18 73     Body mass index is 32.98 kg/m².  @LASTSAO2(3)@    Physical Exam   Constitutional: He is oriented to person, place, and time. He appears well-developed and well-nourished. No distress.   HENT:   Head: Normocephalic and atraumatic.   Right Ear: External ear normal.   Left Ear: External ear normal.   Nose: Nose normal.   Mouth/Throat: Oropharynx is clear and moist.   Right ear with purulent effusion   Eyes: Pupils are equal, round, and reactive to light. Conjunctivae and EOM are normal.   Neck: Normal range of motion. Neck supple.   Cardiovascular: Normal rate, regular rhythm, normal heart sounds and intact distal pulses.    Pulmonary/Chest: Effort normal and breath sounds normal. No respiratory distress. He has no wheezes.   Musculoskeletal: Normal range of motion.   Normal gait   Neurological: He is alert and oriented to person, place, and time.   Skin: Skin is warm and dry.   Psychiatric: He has a normal mood and affect. His behavior is normal. Judgment and thought content normal.   Nursing note and vitals reviewed.      Results for orders placed or performed in visit on 12/23/16   POC Rapid Strep A   Result Value Ref Range    Rapid Strep A Screen Positive (A) Negative, VALID, INVALID, Not Performed    Internal Control Passed Passed    Lot Number [id number]     Expiration Date 6/2,018            Arnav was seen today for follow-up and hypertension.    Diagnoses and all orders for this visit:    Essential hypertension  -     lisinopril (PRINIVIL,ZESTRIL) 10 MG tablet; Take 1 tablet by mouth Daily.    Allergic rhinitis, unspecified seasonality, unspecified trigger  -     fluticasone (FLONASE) 50  MCG/ACT nasal spray; 2 sprays into the nostril(s) as directed by provider Daily for 30 days. Administer 2 sprays in each nostril for each dose.    Acute suppurative otitis media of right ear without spontaneous rupture of tympanic membrane, recurrence not specified  -     amoxicillin (AMOXIL) 500 MG capsule; Take 2 capsules by mouth 2 (Two) Times a Day.    Gastroesophageal reflux disease, esophagitis presence not specified  -     raNITIdine (ZANTAC) 150 MG tablet; Take 1 tablet by mouth 2 (Two) Times a Day.        Needs sleep study.  [facility] can [facility].      Outpatient Medications Prior to Visit   Medication Sig Dispense Refill   • mometasone-formoterol (DULERA) 200-5 MCG/ACT inhaler Inhale 2 puffs 2 (Two) Times a Day. 13 g 11   • montelukast (SINGULAIR) 10 MG tablet Take 1 tablet by mouth Every Night. 30 tablet 6   • naproxen (NAPROSYN) 500 MG tablet Take 500 mg by mouth 2 (two) times a day  .       No facility-administered medications prior to visit.      New Medications Ordered This Visit   Medications   • fluticasone (FLONASE) 50 MCG/ACT nasal spray     Si sprays into the nostril(s) as directed by provider Daily for 30 days. Administer 2 sprays in each nostril for each dose.     Dispense:  1 bottle     Refill:  2   • raNITIdine (ZANTAC) 150 MG tablet     Sig: Take 1 tablet by mouth 2 (Two) Times a Day.     Dispense:  180 tablet     Refill:  0   • amoxicillin (AMOXIL) 500 MG capsule     Sig: Take 2 capsules by mouth 2 (Two) Times a Day.     Dispense:  40 capsule     Refill:  0   • lisinopril (PRINIVIL,ZESTRIL) 10 MG tablet     Sig: Take 1 tablet by mouth Daily.     Dispense:  90 tablet     Refill:  0     [unfilled]  Medications Discontinued During This Encounter   Medication Reason   • naproxen (NAPROSYN) 500 MG tablet *Therapy completed         Return in about 4 weeks (around 2018) for Recheck.

## 2018-10-20 ENCOUNTER — DOCUMENTATION (OUTPATIENT)
Dept: INTERNAL MEDICINE | Facility: CLINIC | Age: 43
End: 2018-10-20

## 2018-10-20 RX ORDER — CEFDINIR 300 MG/1
300 CAPSULE ORAL 2 TIMES DAILY
Qty: 20 CAPSULE | Refills: 0 | Status: SHIPPED | OUTPATIENT
Start: 2018-10-20 | End: 2018-10-30

## 2018-10-24 ENCOUNTER — TELEPHONE (OUTPATIENT)
Dept: INTERNAL MEDICINE | Facility: CLINIC | Age: 43
End: 2018-10-24

## 2018-10-24 NOTE — TELEPHONE ENCOUNTER
LM as per below on patient's VM.    ----- Message from EUGENIO Barton sent at 10/24/2018  9:41 AM EDT -----  Start an OTC Flonase 2 spray/nostril daily and Zyrtec 10mg daily. He will need a Follow up appointment next week for ear check.  ----- Message -----  From: Mae Gentile MA  Sent: 10/24/2018   9:10 AM  To: EUGENIO Barton    In Gus's absence please advise.  ----- Message -----  From: Sienna Bailey  Sent: 10/24/2018   9:01 AM  To: Elpidio Yuen [facility] [place]    Pt states last Tuesday he was in for an ear ache-he has had this for 16 days. He was started on an antibiotic tuesday for four days then switched to something else on Saturday. There has been no relief. What else can we do for him? Call back is [phone].

## 2018-10-24 NOTE — TELEPHONE ENCOUNTER
LM on patient VM as per below.    ----- Message from EUGENIO Barton sent at 10/24/2018 12:12 PM EDT -----  He can try OTC sweet oil into ear  ----- Message -----  From: Mae Gentile MA  Sent: 10/24/2018  11:25 AM  To: EUGENIO Barton    Advised patient as per below, and he now states that he has been taking these 2 meds with no improvement.  Any other advice?    Start an OTC Flonase 2 spray/nostril daily and Zyrtec 10mg daily. He will need a Follow up appointment next week for ear check.    ----- Message -----  From: Mae Gentile MA  Sent: 10/24/2018   9:10 AM  To: EUGENIO Batron    In Gus's absence please advise.  ----- Message -----  From: Sienna Bailey  Sent: 10/24/2018   9:01 AM  To: Elpidio Yuen [facility] [place]    Pt states last Tuesday he was in for an ear ache-he has had this for 16 days. He was started on an antibiotic tuesday for four days then switched to something else on Saturday. There has been no relief. What else can we do for him? Call back is [phone].

## 2018-10-29 ENCOUNTER — TELEPHONE (OUTPATIENT)
Dept: INTERNAL MEDICINE | Facility: CLINIC | Age: 43
End: 2018-10-29

## 2018-10-30 ENCOUNTER — OFFICE VISIT (OUTPATIENT)
Dept: INTERNAL MEDICINE | Facility: CLINIC | Age: 43
End: 2018-10-30

## 2018-10-30 VITALS
HEART RATE: 80 BPM | WEIGHT: 245 LBS | SYSTOLIC BLOOD PRESSURE: 132 MMHG | DIASTOLIC BLOOD PRESSURE: 84 MMHG | HEIGHT: 73 IN | OXYGEN SATURATION: 97 % | BODY MASS INDEX: 32.47 KG/M2

## 2018-10-30 DIAGNOSIS — I10 ESSENTIAL HYPERTENSION: ICD-10-CM

## 2018-10-30 DIAGNOSIS — K21.00 GASTROESOPHAGEAL REFLUX DISEASE WITH ESOPHAGITIS: ICD-10-CM

## 2018-10-30 DIAGNOSIS — H66.001 ACUTE SUPPURATIVE OTITIS MEDIA OF RIGHT EAR WITHOUT SPONTANEOUS RUPTURE OF TYMPANIC MEMBRANE, RECURRENCE NOT SPECIFIED: Primary | ICD-10-CM

## 2018-10-30 DIAGNOSIS — J45.20 MILD INTERMITTENT ASTHMA WITHOUT COMPLICATION: ICD-10-CM

## 2018-10-30 PROCEDURE — 99214 OFFICE O/P EST MOD 30 MIN: CPT | Performed by: INTERNAL MEDICINE

## 2018-10-30 NOTE — PROGRESS NOTES
Arnav Celaya is a 43 y.o. male, who presents with a chief complaint of   Chief Complaint   Patient presents with   • Diarrhea     went to  on 10/28/18, had diarrhea, cold sweats, no appetite, patient doing somewhat better today. He is supposed to take last dose of Omniceft today.       HPI   Pt here on 10/16 w/ ROM.  He was put on amoxicillin 1000mg bid.  He still had issues and was put on omnicef.  His ear is getting better but still feels bad - about 50% better.  He is using his flonase.  He is starting to feel better.  Cough better.      Over the weekend he thought he had the flu.  He had diarrhea, cold sweats, aches.  He had been out working in the rain at [employer].  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      HTN - bp better on lisinopril.  BP's at home 120-130/80's.  No ha/dizziness.  No cough.      Reflux better with zantac    Breathing better.  Not really needing inhaler now.     The following portions of the patient's history were reviewed and updated as appropriate: allergies, current medications, past family history, past medical history, past social history, past surgical history and problem list.    Allergies: Omeprazole    Review of Systems   Constitutional: Negative.    HENT: Negative.    Eyes: Negative.    Respiratory: Negative.    Cardiovascular: Negative.    Gastrointestinal: Negative.    Endocrine: Negative.    Genitourinary: Negative.    Musculoskeletal: Negative.    Skin: Negative.    Allergic/Immunologic: Negative.    Neurological: Negative.    Hematological: Negative.    Psychiatric/Behavioral: Negative.    All other systems reviewed and are negative.            Wt Readings from Last 3 Encounters:   10/30/18 111 kg (245 lb)   10/16/18 113 kg (250 lb)   07/16/18 113 kg (250 lb)     Temp Readings from Last 3 Encounters:   07/16/18 97.3 °F (36.3 °C) (Oral)   06/25/18 98.2 °F (36.8 °C) (Oral)   12/23/16 97.4 °F (36.3 °C) (Oral)     BP Readings from Last 3 Encounters:   10/30/18  132/84   10/16/18 (!) 152/102   07/16/18 138/90     Pulse Readings from Last 3 Encounters:   10/30/18 80   10/16/18 112   07/16/18 72     Body mass index is 32.32 kg/m².  @LASTSAO2(3)@    Physical Exam   Constitutional: He is oriented to person, place, and time. He appears well-developed and well-nourished. No distress.   HENT:   Head: Normocephalic and atraumatic.   Right Ear: External ear normal.   Left Ear: External ear normal.   Nose: Nose normal.   Mouth/Throat: Oropharynx is clear and moist.   Left tm dull.  No erythema  Right tm dull with resolving effusion. No erythema   Eyes: Pupils are equal, round, and reactive to light. Conjunctivae and EOM are normal.   Neck: Normal range of motion. Neck supple.   Cardiovascular: Normal rate, regular rhythm, normal heart sounds and intact distal pulses.    Pulmonary/Chest: Effort normal and breath sounds normal. No respiratory distress. He has no wheezes.   Musculoskeletal: Normal range of motion.   Normal gait   Neurological: He is alert and oriented to person, place, and time.   Skin: Skin is warm and dry.   Psychiatric: He has a normal mood and affect. His behavior is normal. Judgment and thought content normal.   Nursing note and vitals reviewed.      Results for orders placed or performed in visit on 12/23/16   POC Rapid Strep A   Result Value Ref Range    Rapid Strep A Screen Positive (A) Negative, VALID, INVALID, Not Performed    Internal Control Passed Passed    Lot Number [id number]     Expiration Date 6/2,018            Arnav was seen today for diarrhea.    Diagnoses and all orders for this visit:    Acute suppurative otitis media of right ear without spontaneous rupture of tympanic membrane, recurrence not specified  Finish omnicef.  Cont flonase.     Essential hypertension - BP improved with lisinopril.      Mild intermittent asthma without complication - ok to back off on dulera    Gastroesophageal reflux disease with esophagitis - ok to chnge zantac to  prn.    Ov for recheck in 6 mo.     Outpatient Medications Prior to Visit   Medication Sig Dispense Refill   • cefdinir (OMNICEF) 300 MG capsule Take 1 capsule by mouth 2 (Two) Times a Day for 10 days. 20 capsule 0   • fluticasone (FLONASE) 50 MCG/ACT nasal spray 2 sprays into the nostril(s) as directed by provider Daily for 30 days. Administer 2 sprays in each nostril for each dose. 1 bottle 2   • lisinopril (PRINIVIL,ZESTRIL) 10 MG tablet Take 1 tablet by mouth Daily. 90 tablet 0   • mometasone-formoterol (DULERA) 200-5 MCG/ACT inhaler Inhale 2 puffs 2 (Two) Times a Day. 13 g 11   • montelukast (SINGULAIR) 10 MG tablet Take 1 tablet by mouth Every Night. 30 tablet 6   • raNITIdine (ZANTAC) 150 MG tablet Take 1 tablet by mouth 2 (Two) Times a Day. 180 tablet 0   • amoxicillin (AMOXIL) 500 MG capsule Take 2 capsules by mouth 2 (Two) Times a Day. 40 capsule 0     No facility-administered medications prior to visit.      No orders of the defined types were placed in this encounter.    [unfilled]  Medications Discontinued During This Encounter   Medication Reason   • amoxicillin (AMOXIL) 500 MG capsule *Therapy completed         Return in about 6 months (around 4/30/2019) for Recheck.

## 2018-10-31 ENCOUNTER — TELEPHONE (OUTPATIENT)
Dept: INTERNAL MEDICINE | Facility: CLINIC | Age: 43
End: 2018-10-31

## 2018-10-31 DIAGNOSIS — I10 ESSENTIAL HYPERTENSION: ICD-10-CM

## 2018-10-31 RX ORDER — LISINOPRIL 10 MG/1
10 TABLET ORAL DAILY
Qty: 90 TABLET | Refills: 0 | Status: SHIPPED | OUTPATIENT
Start: 2018-10-31 | End: 2019-03-06

## 2018-10-31 NOTE — TELEPHONE ENCOUNTER
Sent in refill.  Advised patient insurance may not reimburse refill if too soon.    ----- Message from Jaqui Henderson sent at 10/31/2018  4:23 PM EDT -----  Regarding: LISINOPRIL VS PET  EMILIE    lisinopril (PRINIVIL,ZESTRIL) 10 MG tablet      Patient found empty destroyed bottle in house; apparently his dog got a hold of it .      [facility] PCP [facility]

## 2018-10-31 NOTE — TELEPHONE ENCOUNTER
----- Message from Jaqui Henderson sent at 10/31/2018  4:23 PM EDT -----  Regarding: LISINOPRIL VS PET  EMILIE    lisinopril (PRINIVIL,ZESTRIL) 10 MG tablet      Patient found empty destroyed bottle in house; apparently his dog got a hold of it .      Can PCP [facility] script [facility]

## 2018-11-01 ENCOUNTER — TELEPHONE (OUTPATIENT)
Dept: INTERNAL MEDICINE | Facility: CLINIC | Age: 43
End: 2018-11-01

## 2018-11-01 RX ORDER — ONDANSETRON HYDROCHLORIDE 8 MG/1
8 TABLET, FILM COATED ORAL EVERY 8 HOURS PRN
Qty: 30 TABLET | Refills: 0 | Status: SHIPPED | OUTPATIENT
Start: 2018-11-01 | End: 2019-03-27

## 2018-11-01 NOTE — TELEPHONE ENCOUNTER
Patient calls this PM, he was seen in the office on 10/30/18, he had an ear infection and diarrhea. He states that the diarrhea is worse now, he is going to the bathroom about every hour, he feels like his stomach is in knots and he gets really nauseated when he needs to go to the bathroom. He is trying to stay hydrated and is drinking a lot of Gatorade, but he doesn't have an appetite. He had a fever yesterday and it seems to get worse throughout the day. He would also like to have a note to give to his superiors in the [employer] at [employer] to let them know that he is being treated. I spoke with Dr. Weber via phone who is OK with note being done, Dr. Scott will sign in Dr. Weber's absence. He may have Zofran 8mg [id number] sent into the pharmacy. Meds sent, patient notified about medication being sent in and he will come today to  letter.

## 2019-03-06 ENCOUNTER — OFFICE VISIT (OUTPATIENT)
Dept: INTERNAL MEDICINE | Facility: CLINIC | Age: 44
End: 2019-03-06

## 2019-03-06 VITALS
BODY MASS INDEX: 33.07 KG/M2 | HEIGHT: 73 IN | OXYGEN SATURATION: 98 % | HEART RATE: 83 BPM | TEMPERATURE: 97.9 F | WEIGHT: 249.5 LBS | SYSTOLIC BLOOD PRESSURE: 130 MMHG | DIASTOLIC BLOOD PRESSURE: 94 MMHG | RESPIRATION RATE: 16 BRPM

## 2019-03-06 DIAGNOSIS — J30.9 ALLERGIC RHINITIS, UNSPECIFIED SEASONALITY, UNSPECIFIED TRIGGER: ICD-10-CM

## 2019-03-06 DIAGNOSIS — R05.3 CHRONIC COUGH: Primary | ICD-10-CM

## 2019-03-06 DIAGNOSIS — I10 ESSENTIAL HYPERTENSION: ICD-10-CM

## 2019-03-06 DIAGNOSIS — J45.20 MILD INTERMITTENT ASTHMA WITHOUT COMPLICATION: ICD-10-CM

## 2019-03-06 DIAGNOSIS — K21.9 GASTROESOPHAGEAL REFLUX DISEASE, ESOPHAGITIS PRESENCE NOT SPECIFIED: ICD-10-CM

## 2019-03-06 PROCEDURE — 99214 OFFICE O/P EST MOD 30 MIN: CPT | Performed by: INTERNAL MEDICINE

## 2019-03-06 RX ORDER — RANITIDINE 150 MG/1
150 TABLET ORAL 2 TIMES DAILY
Qty: 180 TABLET | Refills: 0 | Status: SHIPPED | OUTPATIENT
Start: 2019-03-06 | End: 2020-08-04

## 2019-03-06 RX ORDER — ALBUTEROL SULFATE 90 UG/1
2 AEROSOL, METERED RESPIRATORY (INHALATION) EVERY 4 HOURS PRN
Qty: 1 INHALER | Refills: 2 | Status: SHIPPED | OUTPATIENT
Start: 2019-03-06 | End: 2020-08-04 | Stop reason: SDUPTHER

## 2019-03-06 RX ORDER — AMLODIPINE BESYLATE 5 MG/1
5 TABLET ORAL DAILY
Qty: 30 TABLET | Refills: 0 | Status: SHIPPED | OUTPATIENT
Start: 2019-03-06 | End: 2019-04-08 | Stop reason: SDUPTHER

## 2019-03-06 RX ORDER — OMEPRAZOLE 40 MG/1
40 CAPSULE, DELAYED RELEASE ORAL DAILY
Qty: 30 CAPSULE | Refills: 0 | Status: CANCELLED | OUTPATIENT
Start: 2019-03-06

## 2019-03-06 RX ORDER — FLUTICASONE PROPIONATE 50 MCG
2 SPRAY, SUSPENSION (ML) NASAL DAILY
Qty: 1 BOTTLE | Refills: 0 | Status: SHIPPED | OUTPATIENT
Start: 2019-03-06 | End: 2022-07-20 | Stop reason: SDUPTHER

## 2019-03-06 NOTE — PATIENT INSTRUCTIONS
Chronic cough - multiple reasons for cough.  Pt on ace-I, has uncontrolled GERD, and hx asthma/ allergic rhinitis.      Essential hypertension  -     amLODIPine (NORVASC) 5 MG tablet; Take 1 tablet by mouth Daily.    Gastroesophageal reflux disease, esophagitis presence not specified  -     raNITIdine (ZANTAC) 150 MG tablet; Take 1 tablet by mouth 2 (Two) Times a Day.    Allergic rhinitis, unspecified seasonality, unspecified trigger  -     fluticasone (FLONASE) 50 MCG/ACT nasal spray; 2 sprays into the nostril(s) as directed by provider Daily for 30 days. Administer 2 sprays in each nostril for each dose.    Mild intermittent asthma without complication  -     albuterol sulfate  (90 Base) MCG/ACT inhaler; Inhale 2 puffs Every 4 (Four) Hours As Needed for Wheezing.  -     mometasone-formoterol (DULERA) 200-5 MCG/ACT inhaler; Inhale 2 puffs 2 (Two) Times a Day.      XR Chest PA & Lateral - check cxr if not improved in 3 weeks.

## 2019-03-06 NOTE — PROGRESS NOTES
Arnav Celaya is a 43 y.o. male, who presents with a chief complaint of   Chief Complaint   Patient presents with   • Cough       HPI   Pt here bc of chronic cough.  He has been coughing for about 6-12 mo.  He has allergies and gerd.  He is not taking his gerd meds.  He occ wheezes.  Cough worse after he eats.  Cough when he wakes up in the am is dry but after eating he sometimes coughs up mucus.  His chest burns.  He smokes socially but not often.   He plays basketball weekly and has had more cough with exercise.  No fever.   He tried singulair but no real help with allergies.     He broke out in a rash when hot outside while taking omeprazole.  Unsure if drug reaction.  He has a similar rash break out when hot playing basketball recently.      The following portions of the patient's history were reviewed and updated as appropriate: allergies, current medications, past family history, past medical history, past social history, past surgical history and problem list.    Allergies: Omeprazole    Review of Systems   Constitutional: Negative.    HENT: Positive for congestion.    Eyes: Negative.    Respiratory: Positive for cough and wheezing.    Cardiovascular: Negative.    Gastrointestinal: Negative.    Endocrine: Negative.    Genitourinary: Negative.    Musculoskeletal: Negative.    Skin: Negative.    Allergic/Immunologic: Negative.    Neurological: Negative.    Hematological: Negative.    Psychiatric/Behavioral: Negative.    All other systems reviewed and are negative.            Wt Readings from Last 3 Encounters:   03/06/19 113 kg (249 lb 8 oz)   10/30/18 111 kg (245 lb)   10/16/18 113 kg (250 lb)     Temp Readings from Last 3 Encounters:   03/06/19 97.9 °F (36.6 °C)   07/16/18 97.3 °F (36.3 °C) (Oral)   06/25/18 98.2 °F (36.8 °C) (Oral)     BP Readings from Last 3 Encounters:   03/06/19 130/94   10/30/18 132/84   10/16/18 (!) 152/102     Pulse Readings from Last 3 Encounters:   03/06/19 83   10/30/18 80    10/16/18 112     Body mass index is 32.92 kg/m².  @LASTSAO2(3)@    Physical Exam   Constitutional: He is oriented to person, place, and time. He appears well-developed and well-nourished. No distress.   HENT:   Head: Normocephalic and atraumatic.   Right Ear: External ear normal.   Left Ear: External ear normal.   Nose: Nose normal.   Mouth/Throat: Oropharynx is clear and moist.   Eyes: Conjunctivae and EOM are normal. Pupils are equal, round, and reactive to light.   Neck: Normal range of motion. Neck supple.   Cardiovascular: Normal rate, regular rhythm, normal heart sounds and intact distal pulses.   Pulmonary/Chest: Effort normal and breath sounds normal. No respiratory distress. He has no wheezes.   Musculoskeletal: Normal range of motion.   Normal gait   Neurological: He is alert and oriented to person, place, and time.   Skin: Skin is warm and dry.   Psychiatric: He has a normal mood and affect. His behavior is normal. Judgment and thought content normal.   Nursing note and vitals reviewed.      Results for orders placed or performed in visit on 12/23/16   POC Rapid Strep A   Result Value Ref Range    Rapid Strep A Screen Positive (A) Negative, VALID, INVALID, Not Performed    Internal Control Passed Passed    Lot Number [id number]     Expiration Date 6/2,018            Arnav was seen today for cough.    Diagnoses and all orders for this visit:    Chronic cough - multiple reasons for cough.  Pt on ace-I, has uncontrolled GERD, and hx asthma/ allergic rhinitis.      Essential hypertension  -     amLODIPine (NORVASC) 5 MG tablet; Take 1 tablet by mouth Daily.    Gastroesophageal reflux disease, esophagitis presence not specified  -     raNITIdine (ZANTAC) 150 MG tablet; Take 1 tablet by mouth 2 (Two) Times a Day.    Allergic rhinitis, unspecified seasonality, unspecified trigger  -     fluticasone (FLONASE) 50 MCG/ACT nasal spray; 2 sprays into the nostril(s) as directed by provider Daily for 30 days.  Administer 2 sprays in each nostril for each dose.    Mild intermittent asthma without complication  -     albuterol sulfate  (90 Base) MCG/ACT inhaler; Inhale 2 puffs Every 4 (Four) Hours As Needed for Wheezing.  -     mometasone-formoterol (DULERA) 200-5 MCG/ACT inhaler; Inhale 2 puffs 2 (Two) Times a Day.      XR Chest PA & Lateral - check cxr if not improved in 3 weeks.           Outpatient Medications Prior to Visit   Medication Sig Dispense Refill   • ondansetron (ZOFRAN) 8 MG tablet Take 1 tablet by mouth Every 8 (Eight) Hours As Needed for Nausea or Vomiting. 30 tablet 0   • lisinopril (PRINIVIL,ZESTRIL) 10 MG tablet Take 1 tablet by mouth Daily. 90 tablet 0   • mometasone-formoterol (DULERA) 200-5 MCG/ACT inhaler Inhale 2 puffs 2 (Two) Times a Day. 13 g 11   • montelukast (SINGULAIR) 10 MG tablet Take 1 tablet by mouth Every Night. 30 tablet 6   • raNITIdine (ZANTAC) 150 MG tablet Take 1 tablet by mouth 2 (Two) Times a Day. 180 tablet 0     No facility-administered medications prior to visit.      New Medications Ordered This Visit   Medications   • amLODIPine (NORVASC) 5 MG tablet     Sig: Take 1 tablet by mouth Daily.     Dispense:  30 tablet     Refill:  0   • fluticasone (FLONASE) 50 MCG/ACT nasal spray     Si sprays into the nostril(s) as directed by provider Daily for 30 days. Administer 2 sprays in each nostril for each dose.     Dispense:  1 bottle     Refill:  0   • albuterol sulfate  (90 Base) MCG/ACT inhaler     Sig: Inhale 2 puffs Every 4 (Four) Hours As Needed for Wheezing.     Dispense:  1 inhaler     Refill:  2   • raNITIdine (ZANTAC) 150 MG tablet     Sig: Take 1 tablet by mouth 2 (Two) Times a Day.     Dispense:  180 tablet     Refill:  0   • mometasone-formoterol (DULERA) 200-5 MCG/ACT inhaler     Sig: Inhale 2 puffs 2 (Two) Times a Day.     Dispense:  1 inhaler     Refill:  5     [unfilled]  Medications Discontinued During This Encounter   Medication Reason   •  lisinopril (PRINIVIL,ZESTRIL) 10 MG tablet    • raNITIdine (ZANTAC) 150 MG tablet    • montelukast (SINGULAIR) 10 MG tablet    • mometasone-formoterol (DULERA) 200-5 MCG/ACT inhaler Reorder         Return in about 3 weeks (around 3/27/2019).

## 2019-03-27 ENCOUNTER — OFFICE VISIT (OUTPATIENT)
Dept: INTERNAL MEDICINE | Facility: CLINIC | Age: 44
End: 2019-03-27

## 2019-03-27 VITALS
HEART RATE: 95 BPM | HEIGHT: 73 IN | OXYGEN SATURATION: 97 % | DIASTOLIC BLOOD PRESSURE: 72 MMHG | WEIGHT: 254 LBS | SYSTOLIC BLOOD PRESSURE: 132 MMHG | TEMPERATURE: 98.3 F | BODY MASS INDEX: 33.66 KG/M2 | RESPIRATION RATE: 16 BRPM

## 2019-03-27 DIAGNOSIS — R05.3 CHRONIC COUGH: ICD-10-CM

## 2019-03-27 DIAGNOSIS — I10 ESSENTIAL HYPERTENSION: Primary | ICD-10-CM

## 2019-03-27 DIAGNOSIS — J45.20 MILD INTERMITTENT ASTHMA WITHOUT COMPLICATION: ICD-10-CM

## 2019-03-27 PROCEDURE — 99214 OFFICE O/P EST MOD 30 MIN: CPT | Performed by: INTERNAL MEDICINE

## 2019-03-27 RX ORDER — BUDESONIDE AND FORMOTEROL FUMARATE DIHYDRATE 80; 4.5 UG/1; UG/1
2 AEROSOL RESPIRATORY (INHALATION)
Qty: 1 INHALER | Refills: 5 | Status: SHIPPED | OUTPATIENT
Start: 2019-03-27 | End: 2019-08-30

## 2019-03-27 NOTE — PROGRESS NOTES
Arnav Celaya is a 43 y.o. male, who presents with a chief complaint of   Chief Complaint   Patient presents with   • Hypertension   • Follow-up       HPI   Pt here for follow up.  He was on lisinopril and was coughing a lot. The lisinopril was stopped and amlodipine started.  His cough is almost totally gone.  bp has been well controlled.  No ha/dizziness.      Asthma - pt was on dulera but it was expensive.  Will see if symbicort more affordable with coupon.  He is using albuterol before exercise and this helps.     Congestion improved with nasal spray    gerd better with zantac    The following portions of the patient's history were reviewed and updated as appropriate: allergies, current medications, past family history, past medical history, past social history, past surgical history and problem list.    Allergies: Lisinopril and Omeprazole    Review of Systems   Constitutional: Negative.    HENT: Negative.    Eyes: Negative.    Respiratory: Negative.    Cardiovascular: Negative.    Gastrointestinal: Negative.    Endocrine: Negative.    Genitourinary: Negative.    Musculoskeletal: Negative.    Skin: Negative.    Allergic/Immunologic: Negative.    Neurological: Negative.    Hematological: Negative.    Psychiatric/Behavioral: Negative.    All other systems reviewed and are negative.            Wt Readings from Last 3 Encounters:   03/27/19 115 kg (254 lb)   03/06/19 113 kg (249 lb 8 oz)   10/30/18 111 kg (245 lb)     Temp Readings from Last 3 Encounters:   03/27/19 98.3 °F (36.8 °C) (Oral)   03/06/19 97.9 °F (36.6 °C)   07/16/18 97.3 °F (36.3 °C) (Oral)     BP Readings from Last 3 Encounters:   03/27/19 132/72   03/06/19 130/94   10/30/18 132/84     Pulse Readings from Last 3 Encounters:   03/27/19 95   03/06/19 83   10/30/18 80     Body mass index is 33.51 kg/m².  @LASTSAO2(3)@    Physical Exam   Constitutional: He is oriented to person, place, and time. He appears well-developed and well-nourished. No  distress.   HENT:   Head: Normocephalic and atraumatic.   Right Ear: External ear normal.   Left Ear: External ear normal.   Nose: Nose normal.   Mouth/Throat: Oropharynx is clear and moist.   Eyes: Conjunctivae and EOM are normal. Pupils are equal, round, and reactive to light.   Neck: Normal range of motion. Neck supple.   Cardiovascular: Normal rate, regular rhythm, normal heart sounds and intact distal pulses.   Pulmonary/Chest: Effort normal and breath sounds normal. No respiratory distress. He has no wheezes.   Musculoskeletal: Normal range of motion.   Normal gait   Neurological: He is alert and oriented to person, place, and time.   Skin: Skin is warm and dry.   Psychiatric: He has a normal mood and affect. His behavior is normal. Judgment and thought content normal.   Nursing note and vitals reviewed.      Results for orders placed or performed in visit on 12/23/16   POC Rapid Strep A   Result Value Ref Range    Rapid Strep A Screen Positive (A) Negative, VALID, INVALID, Not Performed    Internal Control Passed Passed    Lot Number [id number]     Expiration Date 6/2,018            Arnav was seen today for hypertension and follow-up.    Diagnoses and all orders for this visit:    Essential hypertension - cont amlodipine    Chronic cough - improved off lisinopril    Mild intermittent asthma without complication  -     budesonide-formoterol (SYMBICORT) 80-4.5 MCG/ACT inhaler; Inhale 2 puffs 2 (Two) Times a Day.          Outpatient Medications Prior to Visit   Medication Sig Dispense Refill   • albuterol sulfate  (90 Base) MCG/ACT inhaler Inhale 2 puffs Every 4 (Four) Hours As Needed for Wheezing. 1 inhaler 2   • amLODIPine (NORVASC) 5 MG tablet Take 1 tablet by mouth Daily. 30 tablet 0   • fluticasone (FLONASE) 50 MCG/ACT nasal spray 2 sprays into the nostril(s) as directed by provider Daily for 30 days. Administer 2 sprays in each nostril for each dose. 1 bottle 0   • raNITIdine (ZANTAC) 150 MG  tablet Take 1 tablet by mouth 2 (Two) Times a Day. 180 tablet 0   • mometasone-formoterol (DULERA) 200-5 MCG/ACT inhaler Inhale 2 puffs 2 (Two) Times a Day. 1 inhaler 5   • ondansetron (ZOFRAN) 8 MG tablet Take 1 tablet by mouth Every 8 (Eight) Hours As Needed for Nausea or Vomiting. 30 tablet 0     No facility-administered medications prior to visit.      New Medications Ordered This Visit   Medications   • budesonide-formoterol (SYMBICORT) 80-4.5 MCG/ACT inhaler     Sig: Inhale 2 puffs 2 (Two) Times a Day.     Dispense:  1 inhaler     Refill:  5     [unfilled]  Medications Discontinued During This Encounter   Medication Reason   • ondansetron (ZOFRAN) 8 MG tablet *Therapy completed   • mometasone-formoterol (DULERA) 200-5 MCG/ACT inhaler          Return in about 6 months (around 9/27/2019) for Recheck, labs.

## 2019-04-08 ENCOUNTER — TELEPHONE (OUTPATIENT)
Dept: INTERNAL MEDICINE | Facility: CLINIC | Age: 44
End: 2019-04-08

## 2019-04-08 DIAGNOSIS — I10 ESSENTIAL HYPERTENSION: ICD-10-CM

## 2019-04-08 RX ORDER — AMLODIPINE BESYLATE 5 MG/1
5 TABLET ORAL DAILY
Qty: 90 TABLET | Refills: 0 | Status: SHIPPED | OUTPATIENT
Start: 2019-04-08 | End: 2019-08-30 | Stop reason: SDUPTHER

## 2019-04-08 NOTE — TELEPHONE ENCOUNTER
RX sent in. Patient aware.     ----- Message from Jaqui Henderson sent at 4/8/2019  8:16 AM EDT -----  Regarding: med nurse out of office today  EMILIE    [facility] called [facility] to get [facility]ill and they asked for his rx # which he didn't have.  Patient needs fill on:     amLODIPine (NORVASC) 5 MG tablet     [facility]    Can pt get a call letting him know when it has been sent over.

## 2019-05-10 DIAGNOSIS — R05.3 CHRONIC COUGH: Primary | ICD-10-CM

## 2019-05-10 RX ORDER — FLUTICASONE PROPIONATE 50 MCG
2 SPRAY, SUSPENSION (ML) NASAL DAILY
Qty: 1 BOTTLE | Refills: 2 | Status: SHIPPED | OUTPATIENT
Start: 2019-05-10 | End: 2022-07-20 | Stop reason: SDUPTHER

## 2019-05-10 RX ORDER — CETIRIZINE HYDROCHLORIDE 10 MG/1
10 TABLET ORAL DAILY
Qty: 90 TABLET | Refills: 3 | Status: SHIPPED | OUTPATIENT
Start: 2019-05-10 | End: 2020-08-04 | Stop reason: SDUPTHER

## 2019-05-10 RX ORDER — AMOXICILLIN 500 MG/1
1000 CAPSULE ORAL 2 TIMES DAILY
Qty: 40 CAPSULE | Refills: 0 | Status: SHIPPED | OUTPATIENT
Start: 2019-05-10 | End: 2019-08-02

## 2019-05-17 ENCOUNTER — HOSPITAL ENCOUNTER (OUTPATIENT)
Dept: GENERAL RADIOLOGY | Facility: HOSPITAL | Age: 44
Discharge: HOME | End: 2019-05-17
Admitting: INTERNAL MEDICINE

## 2019-05-17 DIAGNOSIS — R05.3 CHRONIC COUGH: ICD-10-CM

## 2019-05-17 PROCEDURE — 71046 X-RAY EXAM CHEST 2 VIEWS: CPT

## 2019-08-02 ENCOUNTER — OFFICE VISIT (OUTPATIENT)
Dept: INTERNAL MEDICINE | Facility: CLINIC | Age: 44
End: 2019-08-02

## 2019-08-02 VITALS
RESPIRATION RATE: 16 BRPM | OXYGEN SATURATION: 97 % | SYSTOLIC BLOOD PRESSURE: 136 MMHG | TEMPERATURE: 98.2 F | HEART RATE: 78 BPM | BODY MASS INDEX: 32.74 KG/M2 | WEIGHT: 247 LBS | HEIGHT: 73 IN | DIASTOLIC BLOOD PRESSURE: 80 MMHG

## 2019-08-02 DIAGNOSIS — R05.3 CHRONIC COUGH: ICD-10-CM

## 2019-08-02 DIAGNOSIS — J40 BRONCHITIS: Primary | ICD-10-CM

## 2019-08-02 DIAGNOSIS — J41.1 BRONCHITIS, MUCOPURULENT RECURRENT (HCC): ICD-10-CM

## 2019-08-02 DIAGNOSIS — R06.2 WHEEZING: ICD-10-CM

## 2019-08-02 PROCEDURE — 99214 OFFICE O/P EST MOD 30 MIN: CPT | Performed by: INTERNAL MEDICINE

## 2019-08-02 RX ORDER — PREDNISONE 20 MG/1
40 TABLET ORAL DAILY
Qty: 10 TABLET | Refills: 0 | Status: SHIPPED | OUTPATIENT
Start: 2019-08-02 | End: 2019-08-07

## 2019-08-02 RX ORDER — CEFDINIR 300 MG/1
300 CAPSULE ORAL 2 TIMES DAILY
Qty: 20 CAPSULE | Refills: 0 | Status: SHIPPED | OUTPATIENT
Start: 2019-08-02 | End: 2019-08-12

## 2019-08-02 NOTE — PROGRESS NOTES
Arnav Celaya is a 44 y.o. male, who presents with a chief complaint of   Chief Complaint   Patient presents with   • URI       HPI   Pt here bc of persistent URI sx and persistent cough.  He has been sick for about 2 weeks.  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  He thought it started as a summer cold but sx just getting worse.  He has been coughing, wheezing, and had a productive cough.  He even coughed up blood tinged sputum x 3 this am.   + hoarse voice for a few days.  He is using his symbicort, flonase, zyrtec all daily as well as albuterol prn.  He used to be a regular smoker for about 10 years and occasionally has a cigarette now.      Prior to this illness he has had a chronic cough for several months.  He has been treated for gerd, allergies/pnd, bp meds changed, and treated for asthma/wheezing.  He has had at least 2-3 URI episodes this year which is outside of his norm.      HTN - fairly well controlled.  No ha/dizziness.      The following portions of the patient's history were reviewed and updated as appropriate: allergies, current medications, past family history, past medical history, past social history, past surgical history and problem list.    Allergies: Lisinopril and Omeprazole    Review of Systems   Constitutional: Negative.    HENT: Positive for congestion and postnasal drip.    Eyes: Negative.    Respiratory: Positive for cough and wheezing.    Cardiovascular: Negative.    Gastrointestinal: Negative.    Endocrine: Negative.    Genitourinary: Negative.    Musculoskeletal: Negative.    Skin: Negative.    Allergic/Immunologic: Negative.    Neurological: Negative.    Hematological: Negative.    Psychiatric/Behavioral: Negative.    All other systems reviewed and are negative.            Wt Readings from Last 3 Encounters:   08/02/19 112 kg (247 lb)   03/27/19 115 kg (254 lb)   03/06/19 113 kg (249 lb 8 oz)     Temp Readings from Last 3 Encounters:   08/02/19 98.2 °F (36.8  °C)   03/27/19 98.3 °F (36.8 °C) (Oral)   03/06/19 97.9 °F (36.6 °C)     BP Readings from Last 3 Encounters:   08/02/19 136/80   03/27/19 132/72   03/06/19 130/94     Pulse Readings from Last 3 Encounters:   08/02/19 78   03/27/19 95   03/06/19 83     Body mass index is 32.59 kg/m².  @LASTSAO2(3)@    Physical Exam   Constitutional: He is oriented to person, place, and time. He appears well-developed and well-nourished. No distress.   HENT:   Head: Normocephalic and atraumatic.   Right Ear: External ear normal.   Left Ear: External ear normal.   Nose: Nose normal.   Mouth/Throat: Oropharynx is clear and moist.   Eyes: Conjunctivae and EOM are normal. Pupils are equal, round, and reactive to light.   Neck: Normal range of motion. Neck supple.   Cardiovascular: Normal rate, regular rhythm, normal heart sounds and intact distal pulses.   Pulmonary/Chest: Effort normal and breath sounds normal. No respiratory distress. He has no wheezes.   Musculoskeletal: Normal range of motion.   Normal gait   Neurological: He is alert and oriented to person, place, and time.   Skin: Skin is warm and dry.   Psychiatric: He has a normal mood and affect. His behavior is normal. Judgment and thought content normal.   Nursing note and vitals reviewed.      Results for orders placed or performed in visit on 12/23/16   POC Rapid Strep A   Result Value Ref Range    Rapid Strep A Screen Positive (A) Negative, VALID, INVALID, Not Performed    Internal Control Passed Passed    Lot Number [id number]     Expiration Date 6/2,018            Arnav was seen today for uri.    Diagnoses and all orders for this visit:    Bronchitis  -     CT Chest With Contrast; Future  -     cefdinir (OMNICEF) 300 MG capsule; Take 1 capsule by mouth 2 (Two) Times a Day for 10 days.  -     predniSONE (DELTASONE) 20 MG tablet; Take 2 tablets by mouth Daily for 5 days.    Chronic cough  -     CT Chest With Contrast; Future  -     cefdinir (OMNICEF) 300 MG capsule; Take 1  capsule by mouth 2 (Two) Times a Day for 10 days.    Bronchitis, mucopurulent recurrent (CMS/HCC)  -     CT Chest With Contrast; Future  -     cefdinir (OMNICEF) 300 MG capsule; Take 1 capsule by mouth 2 (Two) Times a Day for 10 days.    Wheezing  -     predniSONE (DELTASONE) 20 MG tablet; Take 2 tablets by mouth Daily for 5 days.      Advised not to smoke at all.    Outpatient Medications Prior to Visit   Medication Sig Dispense Refill   • albuterol sulfate  (90 Base) MCG/ACT inhaler Inhale 2 puffs Every 4 (Four) Hours As Needed for Wheezing. 1 inhaler 2   • amLODIPine (NORVASC) 5 MG tablet Take 1 tablet by mouth Daily. 90 tablet 0   • budesonide-formoterol (SYMBICORT) 80-4.5 MCG/ACT inhaler Inhale 2 puffs 2 (Two) Times a Day. 1 inhaler 5   • cetirizine (zyrTEC) 10 MG tablet Take 1 tablet by mouth Daily. 90 tablet 3   • raNITIdine (ZANTAC) 150 MG tablet Take 1 tablet by mouth 2 (Two) Times a Day. 180 tablet 0   • amoxicillin (AMOXIL) 500 MG capsule Take 2 capsules by mouth 2 (Two) Times a Day. 40 capsule 0     No facility-administered medications prior to visit.      New Medications Ordered This Visit   Medications   • cefdinir (OMNICEF) 300 MG capsule     Sig: Take 1 capsule by mouth 2 (Two) Times a Day for 10 days.     Dispense:  20 capsule     Refill:  0   • predniSONE (DELTASONE) 20 MG tablet     Sig: Take 2 tablets by mouth Daily for 5 days.     Dispense:  10 tablet     Refill:  0     [unfilled]  Medications Discontinued During This Encounter   Medication Reason   • amoxicillin (AMOXIL) 500 MG capsule *Therapy completed         Return in about 4 weeks (around 8/30/2019) for Recheck.

## 2019-08-07 ENCOUNTER — APPOINTMENT (OUTPATIENT)
Dept: CT IMAGING | Facility: HOSPITAL | Age: 44
End: 2019-08-07

## 2019-08-13 ENCOUNTER — TELEPHONE (OUTPATIENT)
Dept: INTERNAL MEDICINE | Facility: CLINIC | Age: 44
End: 2019-08-13

## 2019-08-13 DIAGNOSIS — N28.9 KIDNEY LESION: Primary | ICD-10-CM

## 2019-08-13 NOTE — TELEPHONE ENCOUNTER
----- Message from Lynn Weber MD sent at 8/13/2019  3:35 PM EDT -----  Call pt about labs.  Small benign appearing nodule in lung otherwise normal.  Make sure pt has allergy appt scheduled.  1.9cm kidney lesion - needs renal u/s ordered for further evaluation.  Liver is fatty.

## 2019-08-13 NOTE — TELEPHONE ENCOUNTER
Pt has advised and voiced understanding. Renal U/S order placed. Pt advised to return call with any questions or concerns.

## 2019-08-19 ENCOUNTER — HOSPITAL ENCOUNTER (OUTPATIENT)
Dept: ULTRASOUND IMAGING | Facility: HOSPITAL | Age: 44
Discharge: HOME | End: 2019-08-19
Admitting: INTERNAL MEDICINE

## 2019-08-19 PROCEDURE — 76775 US EXAM ABDO BACK WALL LIM: CPT

## 2019-08-30 ENCOUNTER — OFFICE VISIT (OUTPATIENT)
Dept: INTERNAL MEDICINE | Facility: CLINIC | Age: 44
End: 2019-08-30

## 2019-08-30 VITALS
RESPIRATION RATE: 16 BRPM | HEIGHT: 73 IN | WEIGHT: 244 LBS | BODY MASS INDEX: 32.34 KG/M2 | DIASTOLIC BLOOD PRESSURE: 76 MMHG | OXYGEN SATURATION: 97 % | HEART RATE: 78 BPM | SYSTOLIC BLOOD PRESSURE: 136 MMHG

## 2019-08-30 DIAGNOSIS — R05.3 CHRONIC COUGH: Primary | ICD-10-CM

## 2019-08-30 DIAGNOSIS — I10 ESSENTIAL HYPERTENSION: ICD-10-CM

## 2019-08-30 DIAGNOSIS — Z23 FLU VACCINE NEED: ICD-10-CM

## 2019-08-30 DIAGNOSIS — K21.9 GASTROESOPHAGEAL REFLUX DISEASE, ESOPHAGITIS PRESENCE NOT SPECIFIED: ICD-10-CM

## 2019-08-30 PROCEDURE — 90674 CCIIV4 VAC NO PRSV 0.5 ML IM: CPT | Performed by: INTERNAL MEDICINE

## 2019-08-30 PROCEDURE — 90471 IMMUNIZATION ADMIN: CPT | Performed by: INTERNAL MEDICINE

## 2019-08-30 PROCEDURE — 99214 OFFICE O/P EST MOD 30 MIN: CPT | Performed by: INTERNAL MEDICINE

## 2019-08-30 RX ORDER — AMLODIPINE BESYLATE 5 MG/1
5 TABLET ORAL DAILY
Qty: 90 TABLET | Refills: 0 | Status: SHIPPED | OUTPATIENT
Start: 2019-08-30 | End: 2019-09-18 | Stop reason: SDUPTHER

## 2019-08-30 NOTE — PROGRESS NOTES
Arnav Celaya is a 44 y.o. male, who presents with a chief complaint of   Chief Complaint   Patient presents with   • Bronchitis       HPI   Pt here for follow up.  He is feeling some better.  He saw ent/alllergy.  He was on symbicort.  He had thrush and is on meds for this.  He quit smoking.  Cough is improving.   They also bought a new home and moved.  They have been in the new home for a week and he already feels some better.  He is on ranitidine and still having some gerd sx.        The following portions of the patient's history were reviewed and updated as appropriate: allergies, current medications, past family history, past medical history, past social history, past surgical history and problem list.    Allergies: Lisinopril and Omeprazole    Review of Systems   Constitutional: Negative.    HENT: Negative.    Eyes: Negative.    Respiratory: Positive for cough.    Cardiovascular: Negative.    Gastrointestinal: Negative.    Endocrine: Negative.    Genitourinary: Negative.    Musculoskeletal: Negative.    Skin: Negative.    Allergic/Immunologic: Negative.    Neurological: Negative.    Hematological: Negative.    Psychiatric/Behavioral: Negative.    All other systems reviewed and are negative.            Wt Readings from Last 3 Encounters:   08/30/19 111 kg (244 lb)   08/02/19 112 kg (247 lb)   03/27/19 115 kg (254 lb)     Temp Readings from Last 3 Encounters:   08/02/19 98.2 °F (36.8 °C)   03/27/19 98.3 °F (36.8 °C) (Oral)   03/06/19 97.9 °F (36.6 °C)     BP Readings from Last 3 Encounters:   08/30/19 136/76   08/02/19 136/80   03/27/19 132/72     Pulse Readings from Last 3 Encounters:   08/30/19 78   08/02/19 78   03/27/19 95     Body mass index is 32.19 kg/m².  @LASTSAO2(3)@    Physical Exam   Constitutional: He is oriented to person, place, and time. He appears well-developed and well-nourished. No distress.   HENT:   Head: Normocephalic and atraumatic.   Right Ear: External ear normal.   Left Ear:  External ear normal.   Nose: Nose normal.   Mouth/Throat: Oropharynx is clear and moist.   Eyes: Conjunctivae and EOM are normal. Pupils are equal, round, and reactive to light.   Neck: Normal range of motion. Neck supple.   Cardiovascular: Normal rate, regular rhythm, normal heart sounds and intact distal pulses.   Pulmonary/Chest: Effort normal and breath sounds normal. No respiratory distress. He has no wheezes.   Musculoskeletal: Normal range of motion.   Normal gait   Neurological: He is alert and oriented to person, place, and time.   Skin: Skin is warm and dry.   Psychiatric: He has a normal mood and affect. His behavior is normal. Judgment and thought content normal.   Nursing note and vitals reviewed.      Results for orders placed or performed in visit on 12/23/16   POC Rapid Strep A   Result Value Ref Range    Rapid Strep A Screen Positive (A) Negative, VALID, INVALID, Not Performed    Internal Control Passed Passed    Lot Number [id number]     Expiration Date 6/2,018            Arnav was seen today for bronchitis.    Diagnoses and all orders for this visit:    Chronic cough    Gastroesophageal reflux disease, esophagitis presence not specified    Essential hypertension  -     amLODIPine (NORVASC) 5 MG tablet; Take 1 tablet by mouth Daily.    Flu vaccine need  -     Flucelvax Quad=>4Years (Vial)      Stop symbicort.  Cont albuterol PRN.  Cont allergy meds.  Cont ranitidine bid as pt unable to tolerate omeprazole.    Outpatient Medications Prior to Visit   Medication Sig Dispense Refill   • albuterol sulfate  (90 Base) MCG/ACT inhaler Inhale 2 puffs Every 4 (Four) Hours As Needed for Wheezing. 1 inhaler 2   • cetirizine (zyrTEC) 10 MG tablet Take 1 tablet by mouth Daily. 90 tablet 3   • raNITIdine (ZANTAC) 150 MG tablet Take 1 tablet by mouth 2 (Two) Times a Day. 180 tablet 0   • amLODIPine (NORVASC) 5 MG tablet Take 1 tablet by mouth Daily. 90 tablet 0   • budesonide-formoterol (SYMBICORT) 80-4.5  MCG/ACT inhaler Inhale 2 puffs 2 (Two) Times a Day. 1 inhaler 5     No facility-administered medications prior to visit.      New Medications Ordered This Visit   Medications   • amLODIPine (NORVASC) 5 MG tablet     Sig: Take 1 tablet by mouth Daily.     Dispense:  90 tablet     Refill:  0     [unfilled]  Medications Discontinued During This Encounter   Medication Reason   • budesonide-formoterol (SYMBICORT) 80-4.5 MCG/ACT inhaler    • amLODIPine (NORVASC) 5 MG tablet Reorder         Return in about 3 months (around 11/30/2019) for Recheck.

## 2019-09-05 ENCOUNTER — DOCUMENTATION (OUTPATIENT)
Dept: INTERNAL MEDICINE | Facility: CLINIC | Age: 44
End: 2019-09-05

## 2019-09-05 RX ORDER — FLUTICASONE PROPIONATE 50 MCG
2 SPRAY, SUSPENSION (ML) NASAL DAILY
Qty: 1 BOTTLE | Refills: 5 | Status: SHIPPED | OUTPATIENT
Start: 2019-09-05 | End: 2020-08-04 | Stop reason: SDUPTHER

## 2019-09-16 ENCOUNTER — DOCUMENTATION (OUTPATIENT)
Dept: INTERNAL MEDICINE | Facility: CLINIC | Age: 44
End: 2019-09-16

## 2019-09-16 RX ORDER — AMOXICILLIN 500 MG/1
1000 CAPSULE ORAL 2 TIMES DAILY
Qty: 40 CAPSULE | Refills: 0 | Status: SHIPPED | OUTPATIENT
Start: 2019-09-16 | End: 2019-09-26

## 2019-09-18 ENCOUNTER — TELEPHONE (OUTPATIENT)
Dept: INTERNAL MEDICINE | Facility: CLINIC | Age: 44
End: 2019-09-18

## 2019-09-18 DIAGNOSIS — I10 ESSENTIAL HYPERTENSION: ICD-10-CM

## 2019-09-18 RX ORDER — AMLODIPINE BESYLATE 5 MG/1
5 TABLET ORAL DAILY
Qty: 90 TABLET | Refills: 0 | Status: SHIPPED | OUTPATIENT
Start: 2019-09-18 | End: 2019-12-02 | Stop reason: SDUPTHER

## 2019-09-18 NOTE — TELEPHONE ENCOUNTER
----- Message from Ada Puente sent at 9/18/2019  2:47 PM EDT -----  Contact: CHRIS  PATIENT HAS LOST HIS BP MED THAT DR. PAHN PRESCRIBED FOR HIM. HE HASN'T HAD IT FOR 2 DAYS. HE JUST CALLED FROM WORK WHERE HE ALMOST PASSED OUT, AND HIS BP /110. WILL DR. PHAN CALL IN HIS MEDICINE FOR HIM?

## 2019-09-23 ENCOUNTER — APPOINTMENT (OUTPATIENT)
Dept: LAB | Facility: HOSPITAL | Age: 44
End: 2019-09-23

## 2019-09-23 DIAGNOSIS — I10 ESSENTIAL HYPERTENSION: Primary | ICD-10-CM

## 2019-09-23 PROCEDURE — 85025 COMPLETE CBC W/AUTO DIFF WBC: CPT | Performed by: INTERNAL MEDICINE

## 2019-09-23 PROCEDURE — 83036 HEMOGLOBIN GLYCOSYLATED A1C: CPT | Performed by: INTERNAL MEDICINE

## 2019-09-23 PROCEDURE — 84443 ASSAY THYROID STIM HORMONE: CPT | Performed by: INTERNAL MEDICINE

## 2019-09-23 PROCEDURE — 80061 LIPID PANEL: CPT | Performed by: INTERNAL MEDICINE

## 2019-09-23 PROCEDURE — 80053 COMPREHEN METABOLIC PANEL: CPT | Performed by: INTERNAL MEDICINE

## 2019-09-24 ENCOUNTER — TELEPHONE (OUTPATIENT)
Dept: INTERNAL MEDICINE | Facility: CLINIC | Age: 44
End: 2019-09-24

## 2019-09-24 NOTE — TELEPHONE ENCOUNTER
----- Message from Lynn Weber MD sent at 9/23/2019  4:39 PM EDT -----  a1c 6.3 which is prediabetes range.  Will discuss at upcoming appt.

## 2019-09-25 ENCOUNTER — OFFICE VISIT (OUTPATIENT)
Dept: INTERNAL MEDICINE | Facility: CLINIC | Age: 44
End: 2019-09-25

## 2019-09-25 VITALS
DIASTOLIC BLOOD PRESSURE: 92 MMHG | SYSTOLIC BLOOD PRESSURE: 136 MMHG | HEIGHT: 73 IN | BODY MASS INDEX: 32.19 KG/M2 | RESPIRATION RATE: 18 BRPM | HEART RATE: 89 BPM

## 2019-09-25 DIAGNOSIS — E78.1 HYPERTRIGLYCERIDEMIA: ICD-10-CM

## 2019-09-25 DIAGNOSIS — I10 ESSENTIAL HYPERTENSION: ICD-10-CM

## 2019-09-25 DIAGNOSIS — E88.81 INSULIN RESISTANCE: Primary | ICD-10-CM

## 2019-09-25 DIAGNOSIS — R79.89 ELEVATED LFTS: ICD-10-CM

## 2019-09-25 PROCEDURE — 99214 OFFICE O/P EST MOD 30 MIN: CPT | Performed by: INTERNAL MEDICINE

## 2019-09-25 NOTE — PROGRESS NOTES
Arnav Celaya is a 44 y.o. male, who presents with a chief complaint of   Chief Complaint   Patient presents with   • Hypertension       HPI   Pt here for follow up.      Insulin resistance - fasting glucose and elevated a1c to 6.3.  Pt eats lots of junk on a regular basis.  His diet consists of lots of carbs and he drinks beer.  No regular exercise.     Elevated LFT's - pt obese and does drink etoh    Elevated triglycerides - unhealthy diet.     htn - pt was out of bp meds early in the week bc he couldn't find them.  He was dizzy and almost passed out.  He has the meds now.       The following portions of the patient's history were reviewed and updated as appropriate: allergies, current medications, past family history, past medical history, past social history, past surgical history and problem list.    Allergies: Lisinopril and Omeprazole    Review of Systems   Constitutional: Negative.    HENT: Negative.    Eyes: Negative.    Respiratory: Negative.    Cardiovascular: Negative.    Gastrointestinal: Negative.    Endocrine: Negative.    Genitourinary: Negative.    Musculoskeletal: Negative.    Skin: Negative.    Allergic/Immunologic: Negative.    Neurological: Negative.    Hematological: Negative.    Psychiatric/Behavioral: Negative.    All other systems reviewed and are negative.            Wt Readings from Last 3 Encounters:   08/30/19 111 kg (244 lb)   08/02/19 112 kg (247 lb)   03/27/19 115 kg (254 lb)     Temp Readings from Last 3 Encounters:   08/02/19 98.2 °F (36.8 °C)   03/27/19 98.3 °F (36.8 °C) (Oral)   03/06/19 97.9 °F (36.6 °C)     BP Readings from Last 3 Encounters:   09/25/19 136/92   08/30/19 136/76   08/02/19 136/80     Pulse Readings from Last 3 Encounters:   09/25/19 89   08/30/19 78   08/02/19 78     Body mass index is 32.19 kg/m².  @LASTSAO2(3)@    Physical Exam   Constitutional: He is oriented to person, place, and time. He appears well-developed and well-nourished. No distress.    HENT:   Head: Normocephalic and atraumatic.   Right Ear: External ear normal.   Left Ear: External ear normal.   Nose: Nose normal.   Mouth/Throat: Oropharynx is clear and moist.   Eyes: Conjunctivae and EOM are normal. Pupils are equal, round, and reactive to light.   Neck: Normal range of motion. Neck supple.   Cardiovascular: Normal rate, regular rhythm, normal heart sounds and intact distal pulses.   Pulmonary/Chest: Effort normal and breath sounds normal. No respiratory distress. He has no wheezes.   Musculoskeletal: Normal range of motion.   Normal gait   Neurological: He is alert and oriented to person, place, and time.   Skin: Skin is warm and dry.   Psychiatric: He has a normal mood and affect. His behavior is normal. Judgment and thought content normal.   Nursing note and vitals reviewed.      Results for orders placed or performed in visit on 09/23/19   TSH Rfx On Abnormal To Free T4   Result Value Ref Range    TSH 2.350 0.270 - 4.200 uIU/mL   Comprehensive Metabolic Panel   Result Value Ref Range    Glucose 121 (H) 65 - 99 mg/dL    BUN 13 6 - 20 mg/dL    Creatinine 1.04 0.76 - 1.27 mg/dL    Sodium 136 136 - 145 mmol/L    Potassium 3.9 3.5 - 5.2 mmol/L    Chloride 99 98 - 107 mmol/L    CO2 24.6 22.0 - 29.0 mmol/L    Calcium 9.3 8.6 - 10.5 mg/dL    Total Protein 7.0 6.0 - 8.5 g/dL    Albumin 4.10 3.50 - 5.20 g/dL    ALT (SGPT) 42 (H) 1 - 41 U/L    AST (SGOT) 26 1 - 40 U/L    Alkaline Phosphatase 61 39 - 117 U/L    Total Bilirubin 0.6 0.2 - 1.2 mg/dL    eGFR Non African Amer 77 >60 mL/min/1.73    Globulin 2.9 gm/dL    A/G Ratio 1.4 g/dL    BUN/Creatinine Ratio 12.5 7.0 - 25.0    Anion Gap 12.4 5.0 - 15.0 mmol/L   Hemoglobin A1c   Result Value Ref Range    Hemoglobin A1C 6.30 (H) 4.80 - 5.60 %   Lipid Panel With LDL / HDL Ratio   Result Value Ref Range    Total Cholesterol 169 0 - 200 mg/dL    Triglycerides 198 (H) 0 - 150 mg/dL    HDL Cholesterol 29 (L) 40 - 60 mg/dL    LDL Cholesterol  100 0 - 100 mg/dL     VLDL Cholesterol 39.6 5 - 40 mg/dL    LDL/HDL Ratio 3.46    CBC Auto Differential   Result Value Ref Range    WBC 7.20 3.40 - 10.80 10*3/mm3    RBC 4.81 4.14 - 5.80 10*6/mm3    Hemoglobin 15.7 13.0 - 17.7 g/dL    Hematocrit 45.3 37.5 - 51.0 %    MCV 94.2 79.0 - 97.0 fL    MCH 32.6 26.6 - 33.0 pg    MCHC 34.7 31.5 - 35.7 g/dL    RDW 13.0 12.3 - 15.4 %    RDW-SD 44.3 37.0 - 54.0 fl    MPV 9.8 6.0 - 12.0 fL    Platelets 302 140 - 450 10*3/mm3    Neutrophil % 50.8 42.7 - 76.0 %    Lymphocyte % 27.1 19.6 - 45.3 %    Monocyte % 10.4 5.0 - 12.0 %    Eosinophil % 10.6 (H) 0.3 - 6.2 %    Basophil % 0.8 0.0 - 1.5 %    Immature Grans % 0.3 0.0 - 0.5 %    Neutrophils, Absolute 3.66 1.70 - 7.00 10*3/mm3    Lymphocytes, Absolute 1.95 0.70 - 3.10 10*3/mm3    Monocytes, Absolute 0.75 0.10 - 0.90 10*3/mm3    Eosinophils, Absolute 0.76 (H) 0.00 - 0.40 10*3/mm3    Basophils, Absolute 0.06 0.00 - 0.20 10*3/mm3    Immature Grans, Absolute 0.02 0.00 - 0.05 10*3/mm3    nRBC 0.0 0.0 - 0.2 /100 WBC           Arnav was seen today for hypertension.    Diagnoses and all orders for this visit:    Insulin resistance    Essential hypertension    Elevated LFTs    Hypertriglyceridemia      40 min spent with patient with >50% spent discussing new diagnoses of insulin resistance, elevated lft's, and elevated triglycerides.  Discussed htn and importance of taking meds.  Long discussion about diet, exercise, and healthy lifestyle to help prevent progression of these disease processes.      Outpatient Medications Prior to Visit   Medication Sig Dispense Refill   • albuterol sulfate  (90 Base) MCG/ACT inhaler Inhale 2 puffs Every 4 (Four) Hours As Needed for Wheezing. 1 inhaler 2   • amLODIPine (NORVASC) 5 MG tablet Take 1 tablet by mouth Daily. 90 tablet 0   • amoxicillin (AMOXIL) 500 MG capsule Take 2 capsules by mouth 2 (Two) Times a Day for 10 days. 40 capsule 0   • cetirizine (zyrTEC) 10 MG tablet Take 1 tablet by mouth Daily. 90 tablet  3   • fluticasone (FLONASE) 50 MCG/ACT nasal spray 2 sprays into the nostril(s) as directed by provider Daily. 1 bottle 5   • raNITIdine (ZANTAC) 150 MG tablet Take 1 tablet by mouth 2 (Two) Times a Day. 180 tablet 0     No facility-administered medications prior to visit.      No orders of the defined types were placed in this encounter.    [unfilled]  There are no discontinued medications.      Return in about 4 months (around 1/25/2020) for Recheck, labs.

## 2019-10-02 ENCOUNTER — TELEPHONE (OUTPATIENT)
Dept: INTERNAL MEDICINE | Facility: CLINIC | Age: 44
End: 2019-10-02

## 2019-10-02 NOTE — TELEPHONE ENCOUNTER
----- Message from Ada Puente sent at 10/1/2019 12:34 PM EDT -----  Contact: PATIENT  PLEASE CALL PATIENT AT YOUR EARLIEST CONVENIENCE.

## 2019-12-02 ENCOUNTER — TELEPHONE (OUTPATIENT)
Dept: INTERNAL MEDICINE | Facility: CLINIC | Age: 44
End: 2019-12-02

## 2019-12-02 DIAGNOSIS — I10 ESSENTIAL HYPERTENSION: ICD-10-CM

## 2019-12-02 RX ORDER — AMLODIPINE BESYLATE 5 MG/1
5 TABLET ORAL DAILY
Qty: 90 TABLET | Refills: 1 | Status: SHIPPED | OUTPATIENT
Start: 2019-12-02 | End: 2020-07-01 | Stop reason: SDUPTHER

## 2019-12-02 NOTE — TELEPHONE ENCOUNTER
Patient is out of amLODIPine (NORVASC) 5 MG tablet  For several days now.  Transmission issue with [facility].  Can script be called over to pharmacy so patient can  today. Thank you.

## 2020-01-20 ENCOUNTER — DOCUMENTATION (OUTPATIENT)
Dept: INTERNAL MEDICINE | Facility: CLINIC | Age: 45
End: 2020-01-20

## 2020-01-20 RX ORDER — VALACYCLOVIR HYDROCHLORIDE 1 G/1
1000 TABLET, FILM COATED ORAL 3 TIMES DAILY
Qty: 21 TABLET | Refills: 0 | Status: SHIPPED | OUTPATIENT
Start: 2020-01-20 | End: 2020-01-27

## 2020-01-24 ENCOUNTER — TELEPHONE (OUTPATIENT)
Dept: INTERNAL MEDICINE | Facility: CLINIC | Age: 45
End: 2020-01-24

## 2020-01-24 DIAGNOSIS — B02.9 HERPES ZOSTER WITHOUT COMPLICATION: Primary | ICD-10-CM

## 2020-01-24 RX ORDER — GABAPENTIN 100 MG/1
100 CAPSULE ORAL 3 TIMES DAILY
Qty: 90 CAPSULE | Refills: 0 | Status: SHIPPED | OUTPATIENT
Start: 2020-01-24 | End: 2020-08-04

## 2020-01-24 RX ORDER — HYDROCODONE BITARTRATE AND ACETAMINOPHEN 5; 325 MG/1; MG/1
1 TABLET ORAL EVERY 6 HOURS PRN
Qty: 15 TABLET | Refills: 0 | Status: SHIPPED | OUTPATIENT
Start: 2020-01-24 | End: 2020-08-04

## 2020-06-10 RX ORDER — AMOXICILLIN AND CLAVULANATE POTASSIUM 875; 125 MG/1; MG/1
1 TABLET, FILM COATED ORAL 2 TIMES DAILY
Qty: 20 TABLET | Refills: 0 | Status: SHIPPED | OUTPATIENT
Start: 2020-06-10 | End: 2020-08-04

## 2020-07-01 DIAGNOSIS — I10 ESSENTIAL HYPERTENSION: ICD-10-CM

## 2020-07-01 RX ORDER — AMLODIPINE BESYLATE 5 MG/1
5 TABLET ORAL DAILY
Qty: 90 TABLET | Refills: 1 | Status: SHIPPED | OUTPATIENT
Start: 2020-07-01 | End: 2020-07-06 | Stop reason: SDUPTHER

## 2020-07-01 NOTE — TELEPHONE ENCOUNTER
Caller: Arnav Celaya    Relationship: Self    Best call back number: [phone]  Medication needed:   Requested Prescriptions     Pending Prescriptions Disp Refills   • amLODIPine (NORVASC) 5 MG tablet 90 tablet 1     Sig: Take 1 tablet by mouth Daily.       When do you need the refill by: TOMORROW    Does the patient have less than a 3 day supply:  [x] Yes  [] No    What is the patient's preferred pharmacy: [facility] [address] [facility] - [phone] [facility] [phone]

## 2020-07-06 DIAGNOSIS — I10 ESSENTIAL HYPERTENSION: ICD-10-CM

## 2020-07-06 NOTE — TELEPHONE ENCOUNTER
Caller: Carlene Celaya    Relationship: Emergency Contact    Best call back number: [phone]    Medication needed:   Requested Prescriptions     Pending Prescriptions Disp Refills   • amLODIPine (NORVASC) 5 MG tablet 90 tablet 1     Sig: Take 1 tablet by mouth Daily.       When do you need the refill by: ASAP    What details did the patient provide when requesting the medication: Patient is completely out of this medication. Says [facility] is not showing they have it.    Does the patient have less than a 3 day supply:  [x] Yes  [] No    What is the patient's preferred pharmacy: [facility] [address] [facility] 53 - [phone]  - [phone]

## 2020-07-07 RX ORDER — AMLODIPINE BESYLATE 5 MG/1
5 TABLET ORAL DAILY
Qty: 90 TABLET | Refills: 1 | Status: SHIPPED | OUTPATIENT
Start: 2020-07-07 | End: 2021-09-08

## 2020-08-04 ENCOUNTER — OFFICE VISIT (OUTPATIENT)
Dept: INTERNAL MEDICINE | Facility: CLINIC | Age: 45
End: 2020-08-04

## 2020-08-04 VITALS
DIASTOLIC BLOOD PRESSURE: 80 MMHG | WEIGHT: 245 LBS | HEIGHT: 73 IN | HEART RATE: 62 BPM | RESPIRATION RATE: 16 BRPM | OXYGEN SATURATION: 99 % | TEMPERATURE: 97.7 F | SYSTOLIC BLOOD PRESSURE: 122 MMHG | BODY MASS INDEX: 32.47 KG/M2

## 2020-08-04 DIAGNOSIS — J30.89 ENVIRONMENTAL AND SEASONAL ALLERGIES: ICD-10-CM

## 2020-08-04 DIAGNOSIS — K21.00 GASTROESOPHAGEAL REFLUX DISEASE WITH ESOPHAGITIS: ICD-10-CM

## 2020-08-04 DIAGNOSIS — Z00.00 HEALTHCARE MAINTENANCE: ICD-10-CM

## 2020-08-04 DIAGNOSIS — I10 ESSENTIAL HYPERTENSION: ICD-10-CM

## 2020-08-04 DIAGNOSIS — R05.9 COUGH: Primary | ICD-10-CM

## 2020-08-04 DIAGNOSIS — J45.20 MILD INTERMITTENT ASTHMA WITHOUT COMPLICATION: ICD-10-CM

## 2020-08-04 DIAGNOSIS — R73.03 PREDIABETES: ICD-10-CM

## 2020-08-04 DIAGNOSIS — H66.004 RECURRENT ACUTE SUPPURATIVE OTITIS MEDIA OF RIGHT EAR WITHOUT SPONTANEOUS RUPTURE OF TYMPANIC MEMBRANE: ICD-10-CM

## 2020-08-04 PROCEDURE — 99214 OFFICE O/P EST MOD 30 MIN: CPT | Performed by: INTERNAL MEDICINE

## 2020-08-04 RX ORDER — ALBUTEROL SULFATE 90 UG/1
2 AEROSOL, METERED RESPIRATORY (INHALATION) EVERY 4 HOURS PRN
Qty: 1 INHALER | Refills: 2 | Status: SHIPPED | OUTPATIENT
Start: 2020-08-04 | End: 2022-04-20

## 2020-08-04 RX ORDER — CETIRIZINE HYDROCHLORIDE 10 MG/1
10 TABLET ORAL DAILY
Qty: 90 TABLET | Refills: 3 | Status: SHIPPED | OUTPATIENT
Start: 2020-08-04 | End: 2022-04-20

## 2020-08-04 RX ORDER — FLUTICASONE PROPIONATE 50 MCG
2 SPRAY, SUSPENSION (ML) NASAL DAILY
Qty: 1 BOTTLE | Refills: 5 | Status: SHIPPED | OUTPATIENT
Start: 2020-08-04 | End: 2022-04-20

## 2020-08-04 RX ORDER — FAMOTIDINE 40 MG/1
40 TABLET, FILM COATED ORAL DAILY
Qty: 90 TABLET | Refills: 0 | Status: SHIPPED | OUTPATIENT
Start: 2020-08-04 | End: 2022-04-20

## 2020-08-04 RX ORDER — AMOXICILLIN AND CLAVULANATE POTASSIUM 875; 125 MG/1; MG/1
1 TABLET, FILM COATED ORAL 2 TIMES DAILY
Qty: 20 TABLET | Refills: 0 | Status: SHIPPED | OUTPATIENT
Start: 2020-08-04 | End: 2021-02-12

## 2020-08-04 NOTE — PROGRESS NOTES
Arnav Celaya is a 45 y.o. male, who presents with a chief complaint of   Chief Complaint   Patient presents with   • Earache     s/p swimming right ear   • Cough     clear phlegm x 1 month worsening   • Shortness of Breath       HPI   Pt here bc his chronic cough has returned.  We stopped lisinopril, he quit smoking,.  The cough has been back for about a month.  He has thick, clear mucus.    He is out of flonase and zyrtec.  He was on zantac for gerd in pas but off this.  Salsa and ketchup make his stomach burn.  cxr and chest ct 1 year ago were ok..      htn- well controlled with amlodipine. No ha/dizziness.     The following portions of the patient's history were reviewed and updated as appropriate: allergies, current medications, past family history, past medical history, past social history, past surgical history and problem list.    Allergies: Lisinopril and Omeprazole    Review of Systems   Constitutional: Negative.    HENT: Negative.    Eyes: Negative.    Respiratory: Negative.    Cardiovascular: Negative.    Gastrointestinal: Negative.    Endocrine: Negative.    Genitourinary: Negative.    Musculoskeletal: Negative.    Skin: Negative.    Allergic/Immunologic: Negative.    Neurological: Negative.    Hematological: Negative.    Psychiatric/Behavioral: Negative.    All other systems reviewed and are negative.            Wt Readings from Last 3 Encounters:   08/04/20 111 kg (245 lb)   08/30/19 111 kg (244 lb)   08/02/19 112 kg (247 lb)     Temp Readings from Last 3 Encounters:   08/04/20 97.7 °F (36.5 °C) (Temporal)   08/02/19 98.2 °F (36.8 °C)   03/27/19 98.3 °F (36.8 °C) (Oral)     BP Readings from Last 3 Encounters:   08/04/20 122/80   09/25/19 136/92   08/30/19 136/76     Pulse Readings from Last 3 Encounters:   08/04/20 62   09/25/19 89   08/30/19 78     Body mass index is 32.32 kg/m².  @LASTSAO2(3)@    Physical Exam   Constitutional: He is oriented to person, place, and time. He appears  well-developed and well-nourished. No distress.   HENT:   Head: Normocephalic and atraumatic.   Right Ear: External ear normal.   Left Ear: External ear normal.   Nose: Nose normal.   Mouth/Throat: Oropharynx is clear and moist.   Eyes: Pupils are equal, round, and reactive to light. Conjunctivae and EOM are normal.   Neck: Normal range of motion. Neck supple.   Cardiovascular: Normal rate, regular rhythm, normal heart sounds and intact distal pulses.   Pulmonary/Chest: Effort normal and breath sounds normal. No respiratory distress. He has no wheezes.   Musculoskeletal: Normal range of motion.   Normal gait   Neurological: He is alert and oriented to person, place, and time.   Skin: Skin is warm and dry.   Psychiatric: He has a normal mood and affect. His behavior is normal. Judgment and thought content normal.   Nursing note and vitals reviewed.      Results for orders placed or performed in visit on 09/23/19   TSH Rfx On Abnormal To Free T4   Result Value Ref Range    TSH 2.350 0.270 - 4.200 uIU/mL   Comprehensive Metabolic Panel   Result Value Ref Range    Glucose 121 (H) 65 - 99 mg/dL    BUN 13 6 - 20 mg/dL    Creatinine 1.04 0.76 - 1.27 mg/dL    Sodium 136 136 - 145 mmol/L    Potassium 3.9 3.5 - 5.2 mmol/L    Chloride 99 98 - 107 mmol/L    CO2 24.6 22.0 - 29.0 mmol/L    Calcium 9.3 8.6 - 10.5 mg/dL    Total Protein 7.0 6.0 - 8.5 g/dL    Albumin 4.10 3.50 - 5.20 g/dL    ALT (SGPT) 42 (H) 1 - 41 U/L    AST (SGOT) 26 1 - 40 U/L    Alkaline Phosphatase 61 39 - 117 U/L    Total Bilirubin 0.6 0.2 - 1.2 mg/dL    eGFR Non African Amer 77 >60 mL/min/1.73    Globulin 2.9 gm/dL    A/G Ratio 1.4 g/dL    BUN/Creatinine Ratio 12.5 7.0 - 25.0    Anion Gap 12.4 5.0 - 15.0 mmol/L   Hemoglobin A1c   Result Value Ref Range    Hemoglobin A1C 6.30 (H) 4.80 - 5.60 %   Lipid Panel With LDL / HDL Ratio   Result Value Ref Range    Total Cholesterol 169 0 - 200 mg/dL    Triglycerides 198 (H) 0 - 150 mg/dL    HDL Cholesterol 29 (L) 40 -  60 mg/dL    LDL Cholesterol  100 0 - 100 mg/dL    VLDL Cholesterol 39.6 5 - 40 mg/dL    LDL/HDL Ratio 3.46    CBC Auto Differential   Result Value Ref Range    WBC 7.20 3.40 - 10.80 10*3/mm3    RBC 4.81 4.14 - 5.80 10*6/mm3    Hemoglobin 15.7 13.0 - 17.7 g/dL    Hematocrit 45.3 37.5 - 51.0 %    MCV 94.2 79.0 - 97.0 fL    MCH 32.6 26.6 - 33.0 pg    MCHC 34.7 31.5 - 35.7 g/dL    RDW 13.0 12.3 - 15.4 %    RDW-SD 44.3 37.0 - 54.0 fl    MPV 9.8 6.0 - 12.0 fL    Platelets 302 140 - 450 10*3/mm3    Neutrophil % 50.8 42.7 - 76.0 %    Lymphocyte % 27.1 19.6 - 45.3 %    Monocyte % 10.4 5.0 - 12.0 %    Eosinophil % 10.6 (H) 0.3 - 6.2 %    Basophil % 0.8 0.0 - 1.5 %    Immature Grans % 0.3 0.0 - 0.5 %    Neutrophils, Absolute 3.66 1.70 - 7.00 10*3/mm3    Lymphocytes, Absolute 1.95 0.70 - 3.10 10*3/mm3    Monocytes, Absolute 0.75 0.10 - 0.90 10*3/mm3    Eosinophils, Absolute 0.76 (H) 0.00 - 0.40 10*3/mm3    Basophils, Absolute 0.06 0.00 - 0.20 10*3/mm3    Immature Grans, Absolute 0.02 0.00 - 0.05 10*3/mm3    nRBC 0.0 0.0 - 0.2 /100 WBC           Arnav was seen today for earache, cough and shortness of breath.    Diagnoses and all orders for this visit:    Cough  -     Ambulatory Referral to Pulmonology  -     COVID-19,[facility] ROUTINE, NP/OP SWAB IN TRANSPORT MEDIA OR ESWAB 72 HR TAT - Swab, Nasopharynx; Future    Mild intermittent asthma without complication  -     Ambulatory Referral to Pulmonology  -     albuterol sulfate  (90 Base) MCG/ACT inhaler; Inhale 2 puffs Every 4 (Four) Hours As Needed for Wheezing.    Environmental and seasonal allergies  -     Ambulatory Referral to Pulmonology  -     cetirizine (zyrTEC) 10 MG tablet; Take 1 tablet by mouth Daily.  -     fluticasone (FLONASE) 50 MCG/ACT nasal spray; 2 sprays into the nostril(s) as directed by provider Daily.    Essential hypertension- cont amlodipine    Gastroesophageal reflux disease with esophagitis  -     famotidine (Pepcid) 40 MG tablet; Take 1 tablet  by mouth Daily.          Outpatient Medications Prior to Visit   Medication Sig Dispense Refill   • amLODIPine (NORVASC) 5 MG tablet Take 1 tablet by mouth Daily. 90 tablet 1   • albuterol sulfate  (90 Base) MCG/ACT inhaler Inhale 2 puffs Every 4 (Four) Hours As Needed for Wheezing. 1 inhaler 2   • cetirizine (zyrTEC) 10 MG tablet Take 1 tablet by mouth Daily. 90 tablet 3   • fluticasone (FLONASE) 50 MCG/ACT nasal spray 2 sprays into the nostril(s) as directed by provider Daily. 1 bottle 5   • amoxicillin-clavulanate (Augmentin) 875-125 MG per tablet Take 1 tablet by mouth 2 (Two) Times a Day. 20 tablet 0   • gabapentin (NEURONTIN) 100 MG capsule Take 1 capsule by mouth 3 (Three) Times a Day. 90 capsule 0   • HYDROcodone-acetaminophen (NORCO) 5-325 MG per tablet Take 1 tablet by mouth Every 6 (Six) Hours As Needed for Severe Pain . 15 tablet 0   • raNITIdine (ZANTAC) 150 MG tablet Take 1 tablet by mouth 2 (Two) Times a Day. 180 tablet 0     No facility-administered medications prior to visit.      New Medications Ordered This Visit   Medications   • famotidine (Pepcid) 40 MG tablet     Sig: Take 1 tablet by mouth Daily.     Dispense:  90 tablet     Refill:  0   • albuterol sulfate  (90 Base) MCG/ACT inhaler     Sig: Inhale 2 puffs Every 4 (Four) Hours As Needed for Wheezing.     Dispense:  1 inhaler     Refill:  2   • cetirizine (zyrTEC) 10 MG tablet     Sig: Take 1 tablet by mouth Daily.     Dispense:  90 tablet     Refill:  3   • fluticasone (FLONASE) 50 MCG/ACT nasal spray     Si sprays into the nostril(s) as directed by provider Daily.     Dispense:  1 bottle     Refill:  5   • amoxicillin-clavulanate (Augmentin) 875-125 MG per tablet     Sig: Take 1 tablet by mouth 2 (Two) Times a Day.     Dispense:  20 tablet     Refill:  0     [unfilled]  Medications Discontinued During This Encounter   Medication Reason   • amoxicillin-clavulanate (Augmentin) 875-125 MG per tablet *Therapy completed    • HYDROcodone-acetaminophen (NORCO) 5-325 MG per tablet *Therapy completed   • gabapentin (NEURONTIN) 100 MG capsule *Therapy completed   • raNITIdine (ZANTAC) 150 MG tablet *Therapy completed   • albuterol sulfate  (90 Base) MCG/ACT inhaler Reorder   • cetirizine (zyrTEC) 10 MG tablet Reorder   • fluticasone (FLONASE) 50 MCG/ACT nasal spray Reorder         Return in about 3 months (around 11/4/2020) for Recheck.

## 2020-08-05 LAB
ALBUMIN SERPL-MCNC: 4.5 G/DL (ref 3.5–5.2)
ALBUMIN/GLOB SERPL: 2.1 G/DL
ALP SERPL-CCNC: 62 U/L (ref 39–117)
ALT SERPL-CCNC: 68 U/L (ref 1–41)
AST SERPL-CCNC: 39 U/L (ref 1–40)
BASOPHILS # BLD AUTO: 0.04 10*3/MM3 (ref 0–0.2)
BASOPHILS NFR BLD AUTO: 0.8 % (ref 0–1.5)
BILIRUB SERPL-MCNC: 0.6 MG/DL (ref 0–1.2)
BUN SERPL-MCNC: 15 MG/DL (ref 6–20)
BUN/CREAT SERPL: 14.3 (ref 7–25)
CALCIUM SERPL-MCNC: 9.4 MG/DL (ref 8.6–10.5)
CHLORIDE SERPL-SCNC: 102 MMOL/L (ref 98–107)
CHOLEST SERPL-MCNC: 202 MG/DL (ref 0–200)
CO2 SERPL-SCNC: 27.2 MMOL/L (ref 22–29)
CREAT SERPL-MCNC: 1.05 MG/DL (ref 0.76–1.27)
EOSINOPHIL # BLD AUTO: 0.17 10*3/MM3 (ref 0–0.4)
EOSINOPHIL NFR BLD AUTO: 3.5 % (ref 0.3–6.2)
ERYTHROCYTE [DISTWIDTH] IN BLOOD BY AUTOMATED COUNT: 13.4 % (ref 12.3–15.4)
GLOBULIN SER CALC-MCNC: 2.1 GM/DL
GLUCOSE SERPL-MCNC: 152 MG/DL (ref 65–99)
HBA1C MFR BLD: 7.5 % (ref 4.8–5.6)
HCT VFR BLD AUTO: 46.3 % (ref 37.5–51)
HCV AB S/CO SERPL IA: 0.1 S/CO RATIO (ref 0–0.9)
HDLC SERPL-MCNC: 29 MG/DL (ref 40–60)
HGB BLD-MCNC: 15.7 G/DL (ref 13–17.7)
IMM GRANULOCYTES # BLD AUTO: 0.02 10*3/MM3 (ref 0–0.05)
IMM GRANULOCYTES NFR BLD AUTO: 0.4 % (ref 0–0.5)
LDLC SERPL CALC-MCNC: 127 MG/DL (ref 0–100)
LDLC/HDLC SERPL: 4.38 {RATIO}
LYMPHOCYTES # BLD AUTO: 1.55 10*3/MM3 (ref 0.7–3.1)
LYMPHOCYTES NFR BLD AUTO: 31.5 % (ref 19.6–45.3)
MCH RBC QN AUTO: 32.4 PG (ref 26.6–33)
MCHC RBC AUTO-ENTMCNC: 33.9 G/DL (ref 31.5–35.7)
MCV RBC AUTO: 95.5 FL (ref 79–97)
MONOCYTES # BLD AUTO: 0.53 10*3/MM3 (ref 0.1–0.9)
MONOCYTES NFR BLD AUTO: 10.8 % (ref 5–12)
NEUTROPHILS # BLD AUTO: 2.61 10*3/MM3 (ref 1.7–7)
NEUTROPHILS NFR BLD AUTO: 53 % (ref 42.7–76)
NRBC BLD AUTO-RTO: 0 /100 WBC (ref 0–0.2)
PLATELET # BLD AUTO: 309 10*3/MM3 (ref 140–450)
POTASSIUM SERPL-SCNC: 4.2 MMOL/L (ref 3.5–5.2)
PROT SERPL-MCNC: 6.6 G/DL (ref 6–8.5)
RBC # BLD AUTO: 4.85 10*6/MM3 (ref 4.14–5.8)
SODIUM SERPL-SCNC: 139 MMOL/L (ref 136–145)
T4 FREE SERPL-MCNC: 1.36 NG/DL (ref 0.93–1.7)
TRIGL SERPL-MCNC: 230 MG/DL (ref 0–150)
TSH SERPL DL<=0.005 MIU/L-ACNC: 1.5 UIU/ML (ref 0.27–4.2)
VLDLC SERPL CALC-MCNC: 46 MG/DL
WBC # BLD AUTO: 4.92 10*3/MM3 (ref 3.4–10.8)

## 2020-08-07 DIAGNOSIS — E11.9 TYPE 2 DIABETES MELLITUS WITHOUT COMPLICATION, WITHOUT LONG-TERM CURRENT USE OF INSULIN (HCC): Primary | ICD-10-CM

## 2020-08-07 RX ORDER — BLOOD-GLUCOSE METER
1 KIT MISCELLANEOUS AS NEEDED
Qty: 1 EACH | Refills: 0 | Status: SHIPPED | OUTPATIENT
Start: 2020-08-07 | End: 2021-02-12 | Stop reason: SDUPTHER

## 2020-08-17 ENCOUNTER — TELEPHONE (OUTPATIENT)
Dept: INTERNAL MEDICINE | Facility: CLINIC | Age: 45
End: 2020-08-17

## 2020-08-17 NOTE — TELEPHONE ENCOUNTER
PT CALLED STATING HIS EAR HAS GOTTEN WORSE AND IS CLOGGED TO THE POINT WHERE HIS HEARING IS SUPER MUFFLED ON THAT SIDE. HE WANTS TO SEE AN ENT AS SOON AS POSSIBLE, BUT CANNOT REMEMBER WHO HE SAW PREVIOUSLY. HE IS HOPING DR. PHAN CAN CALL HIM BACK TODAY TO LET HIM KNOW WHO HE SHOULD SEE, AND IF HE NEEDS ANOTHER REFERRAL.    PLEASE ADVISE.    CALLBACK NUMBER: [phone]

## 2020-08-18 DIAGNOSIS — H93.8X9 SENSATION OF FULLNESS IN EAR, UNSPECIFIED LATERALITY: Primary | ICD-10-CM

## 2020-08-18 NOTE — TELEPHONE ENCOUNTER
He saw Dr. Sheriff at [facility] .  Would just call for f/u appt.  It was just a year ago so not sure if new referral needed.  If referral needed we can place the order.

## 2020-08-21 ENCOUNTER — HOSPITAL ENCOUNTER (OUTPATIENT)
Dept: DIABETES SERVICES | Facility: HOSPITAL | Age: 45
Discharge: HOME | End: 2020-08-21
Admitting: INTERNAL MEDICINE

## 2020-08-21 PROCEDURE — G0109 DIAB MANAGE TRN IND/GROUP: HCPCS | Performed by: DIETITIAN, REGISTERED

## 2020-08-21 PROCEDURE — G0109 DIAB MANAGE TRN IND/GROUP: HCPCS

## 2021-02-11 ENCOUNTER — TELEPHONE (OUTPATIENT)
Dept: INTERNAL MEDICINE | Facility: CLINIC | Age: 46
End: 2021-02-11

## 2021-02-11 NOTE — TELEPHONE ENCOUNTER
Pharmacy Name: [facility] [address] [facility], [place] - [address] [address] - [phone] [facility] [phone] FX<br>[facility]ACY [facility] [address] [facility] [phone] [facility] [phone] FX     Pharmacy representative name: ulises    Pharmacy representative phone number:[phone]    Needs a new rx for test strips monitor, and lancets

## 2021-02-12 DIAGNOSIS — E11.9 TYPE 2 DIABETES MELLITUS WITHOUT COMPLICATION, WITHOUT LONG-TERM CURRENT USE OF INSULIN (HCC): ICD-10-CM

## 2021-02-12 RX ORDER — BLOOD-GLUCOSE METER
1 KIT MISCELLANEOUS AS NEEDED
Qty: 1 EACH | Refills: 0 | Status: SHIPPED | OUTPATIENT
Start: 2021-02-12

## 2021-08-12 DIAGNOSIS — I10 ESSENTIAL HYPERTENSION: ICD-10-CM

## 2021-08-12 RX ORDER — AMLODIPINE BESYLATE 5 MG/1
TABLET ORAL
Qty: 90 TABLET | Refills: 0 | OUTPATIENT
Start: 2021-08-12

## 2021-09-08 DIAGNOSIS — I10 ESSENTIAL HYPERTENSION: ICD-10-CM

## 2021-09-08 RX ORDER — AMLODIPINE BESYLATE 5 MG/1
TABLET ORAL
Qty: 90 TABLET | Refills: 0 | Status: SHIPPED | OUTPATIENT
Start: 2021-09-08 | End: 2022-01-17

## 2021-09-08 NOTE — TELEPHONE ENCOUNTER
Rx Refill Note  Requested Prescriptions     Pending Prescriptions Disp Refills   • amLODIPine (NORVASC) 5 MG tablet [Pharmacy Med Name: AMLODIPINE 5MG] 90 tablet 0     Sig: TAKE 1 TABLET BY MOUTH ONCE DAILY      Last office visit with prescribing clinician: 2/12/2021      Next office visit with prescribing clinician: Visit date not found        Comprehensive Metabolic Panel (08/04/2020 10:47)      Lynn Gutierrez LPN  09/08/21, 11:43 EDT

## 2022-01-15 DIAGNOSIS — I10 ESSENTIAL HYPERTENSION: ICD-10-CM

## 2022-01-17 RX ORDER — AMLODIPINE BESYLATE 5 MG/1
5 TABLET ORAL DAILY
Qty: 90 TABLET | Refills: 0 | Status: SHIPPED | OUTPATIENT
Start: 2022-01-17 | End: 2022-07-20 | Stop reason: SDUPTHER

## 2022-04-04 ENCOUNTER — TELEPHONE (OUTPATIENT)
Dept: INTERNAL MEDICINE | Facility: CLINIC | Age: 47
End: 2022-04-04

## 2022-04-04 NOTE — TELEPHONE ENCOUNTER
Please have him come get the labs Dr. Weber ordered at the last visit.  Or he can go to urgent care today if he is very dizzy.

## 2022-04-04 NOTE — TELEPHONE ENCOUNTER
Called and spoke with patient he informed me that his glucose levels had came down and he was feeling better but informed him that he would need to come in for labs, got him scheduled for tomorrow morning.

## 2022-04-04 NOTE — TELEPHONE ENCOUNTER
Pt called stating that he is experiencing some dizziness. He has diabetes but has been Diet controlled. He checked his blood Sugar and it was 253. He did not know what he needs to do. Should I make him an appointment for this week or does he need to go to Urgent Care today?  SENTHIL

## 2022-04-06 LAB
ALBUMIN SERPL-MCNC: 4.6 G/DL (ref 4–5)
ALBUMIN/GLOB SERPL: 1.7 {RATIO} (ref 1.2–2.2)
ALP SERPL-CCNC: 59 IU/L (ref 44–121)
ALT SERPL-CCNC: 35 IU/L (ref 0–44)
AST SERPL-CCNC: 22 IU/L (ref 0–40)
BASOPHILS # BLD AUTO: 0 X10E3/UL (ref 0–0.2)
BASOPHILS NFR BLD AUTO: 1 %
BILIRUB SERPL-MCNC: 0.6 MG/DL (ref 0–1.2)
BUN SERPL-MCNC: 13 MG/DL (ref 6–24)
BUN/CREAT SERPL: 13 (ref 9–20)
CALCIUM SERPL-MCNC: 10.1 MG/DL (ref 8.7–10.2)
CHLORIDE SERPL-SCNC: 98 MMOL/L (ref 96–106)
CHOLEST SERPL-MCNC: 190 MG/DL (ref 100–199)
CO2 SERPL-SCNC: 24 MMOL/L (ref 20–29)
CREAT SERPL-MCNC: 1 MG/DL (ref 0.76–1.27)
EGFRCR SERPLBLD CKD-EPI 2021: 93 ML/MIN/1.73
EOSINOPHIL # BLD AUTO: 0.1 X10E3/UL (ref 0–0.4)
EOSINOPHIL NFR BLD AUTO: 2 %
ERYTHROCYTE [DISTWIDTH] IN BLOOD BY AUTOMATED COUNT: 12.8 % (ref 11.6–15.4)
GLOBULIN SER CALC-MCNC: 2.7 G/DL (ref 1.5–4.5)
GLUCOSE SERPL-MCNC: 220 MG/DL (ref 65–99)
HBA1C MFR BLD: 9 % (ref 4.8–5.6)
HCT VFR BLD AUTO: 50.1 % (ref 37.5–51)
HDLC SERPL-MCNC: 31 MG/DL
HGB BLD-MCNC: 17.1 G/DL (ref 13–17.7)
IMM GRANULOCYTES # BLD AUTO: 0 X10E3/UL (ref 0–0.1)
IMM GRANULOCYTES NFR BLD AUTO: 0 %
LDLC SERPL CALC-MCNC: 128 MG/DL (ref 0–99)
LDLC/HDLC SERPL: 4.1 RATIO (ref 0–3.6)
LYMPHOCYTES # BLD AUTO: 1.6 X10E3/UL (ref 0.7–3.1)
LYMPHOCYTES NFR BLD AUTO: 29 %
MCH RBC QN AUTO: 31.8 PG (ref 26.6–33)
MCHC RBC AUTO-ENTMCNC: 34.1 G/DL (ref 31.5–35.7)
MCV RBC AUTO: 93 FL (ref 79–97)
MONOCYTES # BLD AUTO: 0.6 X10E3/UL (ref 0.1–0.9)
MONOCYTES NFR BLD AUTO: 10 %
NEUTROPHILS # BLD AUTO: 3.2 X10E3/UL (ref 1.4–7)
NEUTROPHILS NFR BLD AUTO: 58 %
PLATELET # BLD AUTO: 335 X10E3/UL (ref 150–450)
POTASSIUM SERPL-SCNC: 4.7 MMOL/L (ref 3.5–5.2)
PROT SERPL-MCNC: 7.3 G/DL (ref 6–8.5)
RBC # BLD AUTO: 5.38 X10E6/UL (ref 4.14–5.8)
SODIUM SERPL-SCNC: 137 MMOL/L (ref 134–144)
TRIGL SERPL-MCNC: 173 MG/DL (ref 0–149)
VLDLC SERPL CALC-MCNC: 31 MG/DL (ref 5–40)
WBC # BLD AUTO: 5.5 X10E3/UL (ref 3.4–10.8)

## 2022-04-15 ENCOUNTER — TELEPHONE (OUTPATIENT)
Dept: INTERNAL MEDICINE | Facility: CLINIC | Age: 47
End: 2022-04-15

## 2022-04-15 NOTE — TELEPHONE ENCOUNTER
Caller: Arnav Celaya    Relationship: Self    Best call back number: [phone]    What is the best time to reach you: ANY    Who are you requesting to speak with (clinical staff, provider,  specific staff member): DR PHAN/NURSE AYALA    Do you know the name of the person who called: LUCY    What was the call regarding: PATIENT RETURNED MISSED CALL. UNABLE TO WARM TRANSFER. PLEASE CALL PATIENT BACK. HE WILL ANSWER.     Do you require a callback: YES

## 2022-04-20 ENCOUNTER — TELEMEDICINE (OUTPATIENT)
Dept: INTERNAL MEDICINE | Facility: CLINIC | Age: 47
End: 2022-04-20

## 2022-04-20 VITALS
SYSTOLIC BLOOD PRESSURE: 122 MMHG | DIASTOLIC BLOOD PRESSURE: 96 MMHG | HEIGHT: 73 IN | BODY MASS INDEX: 31.14 KG/M2 | OXYGEN SATURATION: 97 % | HEART RATE: 72 BPM | WEIGHT: 235 LBS

## 2022-04-20 DIAGNOSIS — I10 ESSENTIAL HYPERTENSION: ICD-10-CM

## 2022-04-20 DIAGNOSIS — E11.9 TYPE 2 DIABETES MELLITUS WITHOUT COMPLICATION, WITHOUT LONG-TERM CURRENT USE OF INSULIN: Primary | ICD-10-CM

## 2022-04-20 DIAGNOSIS — E78.2 MIXED HYPERLIPIDEMIA: ICD-10-CM

## 2022-04-20 PROCEDURE — 99214 OFFICE O/P EST MOD 30 MIN: CPT | Performed by: INTERNAL MEDICINE

## 2022-04-20 RX ORDER — METFORMIN HYDROCHLORIDE 500 MG/1
1000 TABLET, EXTENDED RELEASE ORAL
Qty: 180 TABLET | Refills: 0 | Status: SHIPPED | OUTPATIENT
Start: 2022-04-20 | End: 2022-07-20 | Stop reason: SDUPTHER

## 2022-04-20 NOTE — PROGRESS NOTES
Arnav Celaya is a 46 y.o. male, who presents with a chief complaint of   Chief Complaint   Patient presents with   • Diabetes     Follow up on labs           HPI   This visit has been scheduled as a telehealth visit to comply with patient safety concerns in accordance with CDC recommendations. This was an audio and video enabled telemedicine encounter.    You have chosen to receive care through a televisit visit. Do you consent to use a televisit visit for your medical care today? Yes    Pt here for follow up.  He had diabetes with last labs in august 2020 but hadn't been back for follow up until now.  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  a1c 6.3 -> 7.5->9.0   He has a glucometer but isn't using it.  He is not eating or sleeping regularly right now. + increased urination and nocturia.      htn - he feels like his bp has been well controlled.     karis - [facility] but isnt [facility] right now.     The following portions of the patient's history were reviewed and updated as appropriate: allergies, current medications, past family history, past medical history, past social history, past surgical history and problem list.    Allergies: Lisinopril and Omeprazole    Review of Systems   Constitutional: Negative.    HENT: Negative.    Eyes: Negative.    Respiratory: Negative.    Cardiovascular: Negative.    Gastrointestinal: Negative.    Endocrine: Negative.    Genitourinary: Negative.    Musculoskeletal: Negative.    Skin: Negative.    Allergic/Immunologic: Negative.    Neurological: Negative.    Hematological: Negative.    Psychiatric/Behavioral: Negative.    All other systems reviewed and are negative.            Wt Readings from Last 3 Encounters:   04/20/22 107 kg (235 lb)   02/12/21 106 kg (234 lb)   08/04/20 111 kg (245 lb)     Temp Readings from Last 3 Encounters:   08/04/20 97.7 °F (36.5 °C) (Temporal)   08/02/19 98.2 °F (36.8 °C)   03/27/19 98.3 °F (36.8 °C) (Oral)      BP Readings from Last 3 Encounters:   04/20/22 122/96   02/12/21 120/78   08/04/20 122/80     Pulse Readings from Last 3 Encounters:   04/20/22 72   08/04/20 62   09/25/19 89     Body mass index is 31 kg/m².  SpO2 Readings from Last 3 Encounters:   04/20/22 97%   08/04/20 99%   08/30/19 97%          Physical Exam  Vitals and nursing note reviewed.   Constitutional:       General: He is not in acute distress.     Appearance: He is well-developed.   HENT:      Head: Normocephalic and atraumatic.      Right Ear: External ear normal.      Left Ear: External ear normal.      Nose: Nose normal.   Eyes:      Conjunctiva/sclera: Conjunctivae normal.      Pupils: Pupils are equal, round, and reactive to light.   Cardiovascular:      Rate and Rhythm: Normal rate and regular rhythm.      Heart sounds: Normal heart sounds.   Pulmonary:      Effort: Pulmonary effort is normal. No respiratory distress.      Breath sounds: Normal breath sounds. No wheezing.   Musculoskeletal:         General: Normal range of motion.      Cervical back: Normal range of motion and neck supple.      Comments: Normal gait   Skin:     General: Skin is warm and dry.   Neurological:      Mental Status: He is alert and oriented to person, place, and time.   Psychiatric:         Behavior: Behavior normal.         Thought Content: Thought content normal.         Judgment: Judgment normal.         Results for orders placed or performed in visit on 04/05/22   Comprehensive Metabolic Panel   Result Value Ref Range    Glucose 220 (H) 65 - 99 mg/dL    BUN 13 6 - 24 mg/dL    Creatinine 1.00 0.76 - 1.27 mg/dL    EGFR Result 93 >59 mL/min/1.73    BUN/Creatinine Ratio 13 9 - 20    Sodium 137 134 - 144 mmol/L    Potassium 4.7 3.5 - 5.2 mmol/L    Chloride 98 96 - 106 mmol/L    Total CO2 24 20 - 29 mmol/L    Calcium 10.1 8.7 - 10.2 mg/dL    Total Protein 7.3 6.0 - 8.5 g/dL    Albumin 4.6 4.0 - 5.0 g/dL    Globulin 2.7 1.5 - 4.5 g/dL    A/G Ratio 1.7 1.2 - 2.2     Total Bilirubin 0.6 0.0 - 1.2 mg/dL    Alkaline Phosphatase 59 44 - 121 IU/L    AST (SGOT) 22 0 - 40 IU/L    ALT (SGPT) 35 0 - 44 IU/L   Lipid Panel With LDL / HDL Ratio   Result Value Ref Range    Total Cholesterol 190 100 - 199 mg/dL    Triglycerides 173 (H) 0 - 149 mg/dL    HDL Cholesterol 31 (L) >39 mg/dL    VLDL Cholesterol Contreras 31 5 - 40 mg/dL    LDL Chol Calc (NIH) 128 (H) 0 - 99 mg/dL    LDL/HDL RATIO 4.1 (H) 0.0 - 3.6 ratio   Hemoglobin A1c   Result Value Ref Range    Hemoglobin A1C 9.0 (H) 4.8 - 5.6 %   CBC & Differential   Result Value Ref Range    WBC 5.5 3.4 - 10.8 x10E3/uL    RBC 5.38 4.14 - 5.80 x10E6/uL    Hemoglobin 17.1 13.0 - 17.7 g/dL    Hematocrit 50.1 37.5 - 51.0 %    MCV 93 79 - 97 fL    MCH 31.8 26.6 - 33.0 pg    MCHC 34.1 31.5 - 35.7 g/dL    RDW 12.8 11.6 - 15.4 %    Platelets 335 150 - 450 x10E3/uL    Neutrophil Rel % 58 Not Estab. %    Lymphocyte Rel % 29 Not Estab. %    Monocyte Rel % 10 Not Estab. %    Eosinophil Rel % 2 Not Estab. %    Basophil Rel % 1 Not Estab. %    Neutrophils Absolute 3.2 1.4 - 7.0 x10E3/uL    Lymphocytes Absolute 1.6 0.7 - 3.1 x10E3/uL    Monocytes Absolute 0.6 0.1 - 0.9 x10E3/uL    Eosinophils Absolute 0.1 0.0 - 0.4 x10E3/uL    Basophils Absolute 0.0 0.0 - 0.2 x10E3/uL    Immature Granulocyte Rel % 0 Not Estab. %    Immature Grans Absolute 0.0 0.0 - 0.1 x10E3/uL     Result Review :                  Assessment and Plan    Diagnoses and all orders for this visit:    1. Type 2 diabetes mellitus without complication, without long-term current use of insulin (HCC) (Primary)  -     metFORMIN ER (Glucophage XR) 500 MG 24 hr tablet; Take 2 tablets by mouth Daily With Breakfast.  Dispense: 180 tablet; Refill: 0    2. Essential hypertension - cont lisinopril    3. Mixed hyperlipidemia       Long discussion about pt's health and worsening diabetes.  Start checking glucoses, eat more regularly, and cut down on carb intake. Will start metformin and f/u in 1 month.               Outpatient Medications Prior to Visit   Medication Sig Dispense Refill   • amLODIPine (NORVASC) 5 MG tablet Take 1 tablet by mouth Daily. Schedule labs and appointment. 90 tablet 0   • glucose blood test strip Use as instructed daily 100 each 1   • glucose monitor monitoring kit 1 each As Needed (daily to check glucose). 1 each 0   • [employer] misc 1 each Daily As Needed (to check glucose). 100 each 1   • albuterol sulfate  (90 Base) MCG/ACT inhaler Inhale 2 puffs Every 4 (Four) Hours As Needed for Wheezing. 1 inhaler 2   • cetirizine (zyrTEC) 10 MG tablet Take 1 tablet by mouth Daily. 90 tablet 3   • famotidine (Pepcid) 40 MG tablet Take 1 tablet by mouth Daily. 90 tablet 0   • fluticasone (FLONASE) 50 MCG/ACT nasal spray 2 sprays into the nostril(s) as directed by provider Daily. 1 bottle 5     No facility-administered medications prior to visit.     New Medications Ordered This Visit   Medications   • metFORMIN ER (Glucophage XR) 500 MG 24 hr tablet     Sig: Take 2 tablets by mouth Daily With Breakfast.     Dispense:  180 tablet     Refill:  0     [unfilled]  Medications Discontinued During This Encounter   Medication Reason   • famotidine (Pepcid) 40 MG tablet *Therapy completed   • cetirizine (zyrTEC) 10 MG tablet *Therapy completed   • albuterol sulfate  (90 Base) MCG/ACT inhaler *Therapy completed   • fluticasone (FLONASE) 50 MCG/ACT nasal spray *Therapy completed         Return in about 1 month (around 5/20/2022) for Recheck.    Patient was given instructions and counseling regarding her condition or for health maintenance advice. Please see specific information pulled into the AVS if appropriate.

## 2022-06-16 DIAGNOSIS — I10 ESSENTIAL HYPERTENSION: ICD-10-CM

## 2022-06-16 RX ORDER — AMLODIPINE BESYLATE 5 MG/1
5 TABLET ORAL DAILY
Qty: 90 TABLET | Refills: 0 | OUTPATIENT
Start: 2022-06-16

## 2022-07-20 ENCOUNTER — OFFICE VISIT (OUTPATIENT)
Dept: INTERNAL MEDICINE | Facility: CLINIC | Age: 47
End: 2022-07-20

## 2022-07-20 VITALS
OXYGEN SATURATION: 99 % | TEMPERATURE: 98.1 F | SYSTOLIC BLOOD PRESSURE: 148 MMHG | HEART RATE: 68 BPM | BODY MASS INDEX: 30.88 KG/M2 | WEIGHT: 233 LBS | DIASTOLIC BLOOD PRESSURE: 88 MMHG | HEIGHT: 73 IN

## 2022-07-20 DIAGNOSIS — E11.9 TYPE 2 DIABETES MELLITUS WITHOUT COMPLICATION, WITHOUT LONG-TERM CURRENT USE OF INSULIN: Primary | ICD-10-CM

## 2022-07-20 DIAGNOSIS — I10 ESSENTIAL HYPERTENSION: ICD-10-CM

## 2022-07-20 DIAGNOSIS — J30.9 ALLERGIC RHINITIS, UNSPECIFIED SEASONALITY, UNSPECIFIED TRIGGER: ICD-10-CM

## 2022-07-20 DIAGNOSIS — E78.2 MIXED HYPERLIPIDEMIA: ICD-10-CM

## 2022-07-20 LAB
EXPIRATION DATE: ABNORMAL
HBA1C MFR BLD: 7.5 %
Lab: 855

## 2022-07-20 PROCEDURE — 83036 HEMOGLOBIN GLYCOSYLATED A1C: CPT | Performed by: INTERNAL MEDICINE

## 2022-07-20 PROCEDURE — 99214 OFFICE O/P EST MOD 30 MIN: CPT | Performed by: INTERNAL MEDICINE

## 2022-07-20 RX ORDER — CETIRIZINE HYDROCHLORIDE 10 MG/1
10 TABLET ORAL DAILY
Qty: 90 TABLET | Refills: 3 | Status: SHIPPED | OUTPATIENT
Start: 2022-07-20

## 2022-07-20 RX ORDER — FLUTICASONE PROPIONATE 50 MCG
2 SPRAY, SUSPENSION (ML) NASAL DAILY
Qty: 48 G | Refills: 3 | Status: SHIPPED | OUTPATIENT
Start: 2022-07-20

## 2022-07-20 RX ORDER — AMLODIPINE BESYLATE 5 MG/1
5 TABLET ORAL DAILY
Qty: 90 TABLET | Refills: 1 | Status: SHIPPED | OUTPATIENT
Start: 2022-07-20 | End: 2022-08-08 | Stop reason: HOSPADM

## 2022-07-20 RX ORDER — METFORMIN HYDROCHLORIDE 500 MG/1
1000 TABLET, EXTENDED RELEASE ORAL
Qty: 180 TABLET | Refills: 1 | Status: SHIPPED | OUTPATIENT
Start: 2022-07-20 | End: 2023-02-13 | Stop reason: SDUPTHER

## 2022-07-20 NOTE — PROGRESS NOTES
Arnav Celaya is a 47 y.o. male, who presents with a chief complaint of   Chief Complaint   Patient presents with   • Med Refill     Hasn't had bp med in 4 weeks and is almost out of metformin           HPI   Pt here bc he is out of medication    HTN - bp up today bc he is out of his amlodipine.    DM - he hasnt been checking glucoses.  He is on metformin 1000mg daily.  a1c 9.0 -> 7.5    hld - pt not currently on statin but working to get his numbers down.    [facility]achrom[facility] - [facility]    ALEM - [facility].  He does not use his cpap.    AR - sx worse lately.  Needs flonase and zyrtec    The following portions of the patient's history were reviewed and updated as appropriate: allergies, current medications, past family history, past medical history, past social history, past surgical history and problem list.    Allergies: Lisinopril and Omeprazole    Review of Systems   Constitutional: Negative.    HENT: Negative.    Eyes: Negative.    Respiratory: Negative.    Cardiovascular: Negative.    Gastrointestinal: Negative.    Endocrine: Negative.    Genitourinary: Negative.    Musculoskeletal: Negative.    Skin: Negative.    Allergic/Immunologic: Negative.    Neurological: Negative.    Hematological: Negative.    Psychiatric/Behavioral: Negative.    All other systems reviewed and are negative.            Wt Readings from Last 3 Encounters:   07/20/22 106 kg (233 lb)   04/20/22 107 kg (235 lb)   02/12/21 106 kg (234 lb)     Temp Readings from Last 3 Encounters:   07/20/22 98.1 °F (36.7 °C)   08/04/20 97.7 °F (36.5 °C) (Temporal)   08/02/19 98.2 °F (36.8 °C)     BP Readings from Last 3 Encounters:   07/20/22 148/88   04/20/22 122/96   02/12/21 120/78     Pulse Readings from Last 3 Encounters:   07/20/22 68   04/20/22 72   08/04/20 62     Body mass index is 30.74 kg/m².  SpO2 Readings from Last 3 Encounters:   07/20/22 99%   04/20/22 97%   08/04/20 99%          Physical Exam  Vitals and nursing note  reviewed.   Constitutional:       General: He is not in acute distress.     Appearance: He is well-developed.   HENT:      Head: Normocephalic and atraumatic.      Right Ear: External ear normal.      Left Ear: External ear normal.      Nose: Nose normal.   Eyes:      Conjunctiva/sclera: Conjunctivae normal.      Pupils: Pupils are equal, round, and reactive to light.   Cardiovascular:      Rate and Rhythm: Normal rate and regular rhythm.      Heart sounds: Normal heart sounds.   Pulmonary:      Effort: Pulmonary effort is normal. No respiratory distress.      Breath sounds: Normal breath sounds. No wheezing.   Musculoskeletal:         General: Normal range of motion.      Cervical back: Normal range of motion and neck supple.      Comments: Normal gait   Skin:     General: Skin is warm and dry.   Neurological:      Mental Status: He is alert and oriented to person, place, and time.   Psychiatric:         Behavior: Behavior normal.         Thought Content: Thought content normal.         Judgment: Judgment normal.         Results for orders placed or performed in visit on 07/20/22   POC Glycosylated Hemoglobin (Hb A1C)    Specimen: Blood   Result Value Ref Range    Hemoglobin A1C 7.5 %    Lot Number [id number]     Expiration Date 7,323,219      Result Review :                  Assessment and Plan    Diagnoses and all orders for this visit:    1. Type 2 diabetes mellitus without complication, without long-term current use of insulin (HCC) (Primary)  -     CBC & Differential  -     Comprehensive Metabolic Panel  -     Hemoglobin A1c  -     Lipid Panel With LDL / HDL Ratio  -     T4, Free  -     TSH  -     Microalbumin / Creatinine Urine Ratio - Urine, Clean Catch  -     metFORMIN ER (Glucophage XR) 500 MG 24 hr tablet; Take 2 tablets by mouth Daily With Breakfast.  Dispense: 180 tablet; Refill: 1  -     POC Glycosylated Hemoglobin (Hb A1C)    2. Essential hypertension  -     CBC & Differential  -     Comprehensive Metabolic  Panel  -     Hemoglobin A1c  -     Lipid Panel With LDL / HDL Ratio  -     T4, Free  -     TSH  -     Microalbumin / Creatinine Urine Ratio - Urine, Clean Catch  -     amLODIPine (NORVASC) 5 MG tablet; Take 1 tablet by mouth Daily. Schedule labs and appointment.  Dispense: 90 tablet; Refill: 1    3. Mixed hyperlipidemia  -     CBC & Differential  -     Comprehensive Metabolic Panel  -     Hemoglobin A1c  -     Lipid Panel With LDL / HDL Ratio  -     T4, Free  -     TSH  -     Microalbumin / Creatinine Urine Ratio - Urine, Clean Catch    4. Allergic rhinitis, unspecified seasonality, unspecified trigger  -     fluticasone (Flonase) 50 MCG/ACT nasal spray; 2 sprays into the nostril(s) as directed by provider Daily.  Dispense: 48 g; Refill: 3  -     cetirizine (zyrTEC) 10 MG tablet; Take 1 tablet by mouth Daily.  Dispense: 90 tablet; Refill: 3                Outpatient Medications Prior to Visit   Medication Sig Dispense Refill   • glucose blood test strip Use as instructed daily 100 each 1   • glucose monitor monitoring kit 1 each As Needed (daily to check glucose). 1 each 0   • Lancet [employer] misc 1 each Daily As Needed (to check glucose). 100 each 1   • amLODIPine (NORVASC) 5 MG tablet Take 1 tablet by mouth Daily. Schedule labs and appointment. 90 tablet 0   • metFORMIN ER (Glucophage XR) 500 MG 24 hr tablet Take 2 tablets by mouth Daily With Breakfast. 180 tablet 0     No facility-administered medications prior to visit.     New Medications Ordered This Visit   Medications   • amLODIPine (NORVASC) 5 MG tablet     Sig: Take 1 tablet by mouth Daily. Schedule labs and appointment.     Dispense:  90 tablet     Refill:  1     01/15/2022 2:22:03 PM   • metFORMIN ER (Glucophage XR) 500 MG 24 hr tablet     Sig: Take 2 tablets by mouth Daily With Breakfast.     Dispense:  180 tablet     Refill:  1   • fluticasone (Flonase) 50 MCG/ACT nasal spray     Si sprays into the nostril(s) as directed by provider Daily.      Dispense:  48 g     Refill:  3   • cetirizine (zyrTEC) 10 MG tablet     Sig: Take 1 tablet by mouth Daily.     Dispense:  90 tablet     Refill:  3     [unfilled]  Medications Discontinued During This Encounter   Medication Reason   • amLODIPine (NORVASC) 5 MG tablet Reorder   • metFORMIN ER (Glucophage XR) 500 MG 24 hr tablet Reorder   • fluticasone (FLONASE) 50 MCG/ACT nasal spray Duplicate order   • fluticasone (FLONASE) 50 MCG/ACT nasal spray Duplicate order   • fluticasone (FLONASE) 50 MCG/ACT nasal spray Duplicate order         Return in about 3 months (around 10/20/2022) for Recheck, labs.    Patient was given instructions and counseling regarding her condition or for health maintenance advice. Please see specific information pulled into the AVS if appropriate.

## 2022-07-20 NOTE — PATIENT INSTRUCTIONS
Check blood pressure.  Goal is less than 130/85.  Call if bp not going down with restarting amlodipine.

## 2022-08-06 ENCOUNTER — HOSPITAL ENCOUNTER (INPATIENT)
Facility: HOSPITAL | Age: 47
LOS: 2 days | Discharge: HOME | End: 2022-08-08
Attending: INTERNAL MEDICINE | Admitting: INTERNAL MEDICINE

## 2022-08-06 ENCOUNTER — APPOINTMENT (OUTPATIENT)
Dept: GENERAL RADIOLOGY | Facility: HOSPITAL | Age: 47
End: 2022-08-06

## 2022-08-06 ENCOUNTER — HOSPITAL ENCOUNTER (EMERGENCY)
Facility: HOSPITAL | Age: 47
Discharge: ANOTHER HOSPITAL | End: 2022-08-06
Admitting: EMERGENCY MEDICINE

## 2022-08-06 VITALS
WEIGHT: 230 LBS | TEMPERATURE: 98.9 F | RESPIRATION RATE: 20 BRPM | HEART RATE: 85 BPM | BODY MASS INDEX: 30.48 KG/M2 | SYSTOLIC BLOOD PRESSURE: 140 MMHG | OXYGEN SATURATION: 97 % | DIASTOLIC BLOOD PRESSURE: 102 MMHG | HEIGHT: 73 IN

## 2022-08-06 DIAGNOSIS — I21.19 INFERIOR MYOCARDIAL INFARCTION: Primary | ICD-10-CM

## 2022-08-06 DIAGNOSIS — J45.20 MILD INTERMITTENT ASTHMA WITHOUT COMPLICATION: ICD-10-CM

## 2022-08-06 DIAGNOSIS — I21.3 ST ELEVATION MYOCARDIAL INFARCTION (STEMI), UNSPECIFIED ARTERY: Primary | ICD-10-CM

## 2022-08-06 LAB
ACT BLD: 266 SECONDS (ref 82–152)
ACT BLD: 329 SECONDS (ref 82–152)
ALBUMIN SERPL-MCNC: 5 G/DL (ref 3.5–5.2)
ALBUMIN/GLOB SERPL: 1.6 G/DL
ALP SERPL-CCNC: 57 U/L (ref 39–117)
ALT SERPL W P-5'-P-CCNC: 32 U/L (ref 1–41)
ANION GAP SERPL CALCULATED.3IONS-SCNC: 18.5 MMOL/L (ref 5–15)
APTT PPP: 22.2 SECONDS (ref 24.3–38.1)
AST SERPL-CCNC: 22 U/L (ref 1–40)
BASOPHILS # BLD AUTO: 0.04 10*3/MM3 (ref 0–0.2)
BASOPHILS NFR BLD AUTO: 0.4 % (ref 0–1.5)
BILIRUB SERPL-MCNC: 0.7 MG/DL (ref 0–1.2)
BUN SERPL-MCNC: 16 MG/DL (ref 6–20)
BUN/CREAT SERPL: 13.1 (ref 7–25)
CALCIUM SPEC-SCNC: 10.4 MG/DL (ref 8.6–10.5)
CHLORIDE SERPL-SCNC: 95 MMOL/L (ref 98–107)
CO2 SERPL-SCNC: 19.5 MMOL/L (ref 22–29)
CREAT SERPL-MCNC: 1.22 MG/DL (ref 0.76–1.27)
DEPRECATED RDW RBC AUTO: 40 FL (ref 37–54)
EGFRCR SERPLBLD CKD-EPI 2021: 73.1 ML/MIN/1.73
EOSINOPHIL # BLD AUTO: 0.16 10*3/MM3 (ref 0–0.4)
EOSINOPHIL NFR BLD AUTO: 1.6 % (ref 0.3–6.2)
ERYTHROCYTE [DISTWIDTH] IN BLOOD BY AUTOMATED COUNT: 12.2 % (ref 12.3–15.4)
GLOBULIN UR ELPH-MCNC: 3.2 GM/DL
GLUCOSE BLDC GLUCOMTR-MCNC: 196 MG/DL (ref 70–130)
GLUCOSE SERPL-MCNC: 220 MG/DL (ref 65–99)
HCT VFR BLD AUTO: 48.9 % (ref 37.5–51)
HGB BLD-MCNC: 17.4 G/DL (ref 13–17.7)
HOLD SPECIMEN: NORMAL
HOLD SPECIMEN: NORMAL
IMM GRANULOCYTES # BLD AUTO: 0.03 10*3/MM3 (ref 0–0.05)
IMM GRANULOCYTES NFR BLD AUTO: 0.3 % (ref 0–0.5)
INR PPP: 0.98 (ref 0.9–1.1)
LYMPHOCYTES # BLD AUTO: 3.14 10*3/MM3 (ref 0.7–3.1)
LYMPHOCYTES NFR BLD AUTO: 30.5 % (ref 19.6–45.3)
MCH RBC QN AUTO: 31.5 PG (ref 26.6–33)
MCHC RBC AUTO-ENTMCNC: 35.6 G/DL (ref 31.5–35.7)
MCV RBC AUTO: 88.4 FL (ref 79–97)
MONOCYTES # BLD AUTO: 0.99 10*3/MM3 (ref 0.1–0.9)
MONOCYTES NFR BLD AUTO: 9.6 % (ref 5–12)
NEUTROPHILS NFR BLD AUTO: 5.93 10*3/MM3 (ref 1.7–7)
NEUTROPHILS NFR BLD AUTO: 57.6 % (ref 42.7–76)
PLATELET # BLD AUTO: 389 10*3/MM3 (ref 140–450)
PMV BLD AUTO: 9.2 FL (ref 6–12)
POTASSIUM SERPL-SCNC: 3.5 MMOL/L (ref 3.5–5.2)
PROT SERPL-MCNC: 8.2 G/DL (ref 6–8.5)
PROTHROMBIN TIME: 13.1 SECONDS (ref 12.1–15)
QT INTERVAL: 348 MS
QT INTERVAL: 351 MS
QT INTERVAL: 387 MS
RBC # BLD AUTO: 5.53 10*6/MM3 (ref 4.14–5.8)
SODIUM SERPL-SCNC: 133 MMOL/L (ref 136–145)
TROPONIN T SERPL-MCNC: <0.01 NG/ML (ref 0–0.03)
WBC NRBC COR # BLD: 10.29 10*3/MM3 (ref 3.4–10.8)
WHOLE BLOOD HOLD COAG: NORMAL
WHOLE BLOOD HOLD SPECIMEN: NORMAL

## 2022-08-06 PROCEDURE — 92978 ENDOLUMINL IVUS OCT C 1ST: CPT | Performed by: INTERNAL MEDICINE

## 2022-08-06 PROCEDURE — 0 IOPAMIDOL PER 1 ML: Performed by: INTERNAL MEDICINE

## 2022-08-06 PROCEDURE — 25010000002 EPTIFIBATIDE PER 5 MG: Performed by: INTERNAL MEDICINE

## 2022-08-06 PROCEDURE — C9606 PERC D-E COR REVASC W AMI S: HCPCS | Performed by: INTERNAL MEDICINE

## 2022-08-06 PROCEDURE — 80053 COMPREHEN METABOLIC PANEL: CPT

## 2022-08-06 PROCEDURE — 25010000002 MIDAZOLAM PER 1 MG: Performed by: INTERNAL MEDICINE

## 2022-08-06 PROCEDURE — 25010000002 HEPARIN (PORCINE) PER 1000 UNITS: Performed by: INTERNAL MEDICINE

## 2022-08-06 PROCEDURE — 96374 THER/PROPH/DIAG INJ IV PUSH: CPT

## 2022-08-06 PROCEDURE — C1769 GUIDE WIRE: HCPCS | Performed by: INTERNAL MEDICINE

## 2022-08-06 PROCEDURE — C1725 CATH, TRANSLUMIN NON-LASER: HCPCS | Performed by: INTERNAL MEDICINE

## 2022-08-06 PROCEDURE — 85347 COAGULATION TIME ACTIVATED: CPT

## 2022-08-06 PROCEDURE — 93010 ELECTROCARDIOGRAM REPORT: CPT | Performed by: INTERNAL MEDICINE

## 2022-08-06 PROCEDURE — 85730 THROMBOPLASTIN TIME PARTIAL: CPT | Performed by: EMERGENCY MEDICINE

## 2022-08-06 PROCEDURE — C1757 CATH, THROMBECTOMY/EMBOLECT: HCPCS | Performed by: INTERNAL MEDICINE

## 2022-08-06 PROCEDURE — 84484 ASSAY OF TROPONIN QUANT: CPT

## 2022-08-06 PROCEDURE — 027034Z DILATION OF CORONARY ARTERY, ONE ARTERY WITH DRUG-ELUTING INTRALUMINAL DEVICE, PERCUTANEOUS APPROACH: ICD-10-PCS | Performed by: INTERNAL MEDICINE

## 2022-08-06 PROCEDURE — 82962 GLUCOSE BLOOD TEST: CPT

## 2022-08-06 PROCEDURE — C1874 STENT, COATED/COV W/DEL SYS: HCPCS | Performed by: INTERNAL MEDICINE

## 2022-08-06 PROCEDURE — 93005 ELECTROCARDIOGRAM TRACING: CPT | Performed by: INTERNAL MEDICINE

## 2022-08-06 PROCEDURE — 93005 ELECTROCARDIOGRAM TRACING: CPT | Performed by: EMERGENCY MEDICINE

## 2022-08-06 PROCEDURE — 25010000002 FENTANYL CITRATE (PF) 50 MCG/ML SOLUTION: Performed by: INTERNAL MEDICINE

## 2022-08-06 PROCEDURE — 93005 ELECTROCARDIOGRAM TRACING: CPT

## 2022-08-06 PROCEDURE — 92941 PRQ TRLML REVSC TOT OCCL AMI: CPT | Performed by: INTERNAL MEDICINE

## 2022-08-06 PROCEDURE — 25010000002 HEPARIN (PORCINE) PER 1000 UNITS: Performed by: EMERGENCY MEDICINE

## 2022-08-06 PROCEDURE — 99222 1ST HOSP IP/OBS MODERATE 55: CPT | Performed by: INTERNAL MEDICINE

## 2022-08-06 PROCEDURE — 93458 L HRT ARTERY/VENTRICLE ANGIO: CPT | Performed by: INTERNAL MEDICINE

## 2022-08-06 PROCEDURE — 92921: CPT | Performed by: INTERNAL MEDICINE

## 2022-08-06 PROCEDURE — 25010000002 HEPARIN (PORCINE) 25000-0.45 UT/250ML-% SOLUTION: Performed by: EMERGENCY MEDICINE

## 2022-08-06 PROCEDURE — 96376 TX/PRO/DX INJ SAME DRUG ADON: CPT

## 2022-08-06 PROCEDURE — B2111ZZ FLUOROSCOPY OF MULTIPLE CORONARY ARTERIES USING LOW OSMOLAR CONTRAST: ICD-10-PCS | Performed by: INTERNAL MEDICINE

## 2022-08-06 PROCEDURE — 02C03ZZ EXTIRPATION OF MATTER FROM CORONARY ARTERY, ONE ARTERY, PERCUTANEOUS APPROACH: ICD-10-PCS | Performed by: INTERNAL MEDICINE

## 2022-08-06 PROCEDURE — 92921 PR PRQ TRLUML CORONARY ANGIOPLASTY ADDL BRANCH: CPT | Performed by: INTERNAL MEDICINE

## 2022-08-06 PROCEDURE — 99152 MOD SED SAME PHYS/QHP 5/>YRS: CPT | Performed by: INTERNAL MEDICINE

## 2022-08-06 PROCEDURE — C1887 CATHETER, GUIDING: HCPCS | Performed by: INTERNAL MEDICINE

## 2022-08-06 PROCEDURE — 85610 PROTHROMBIN TIME: CPT | Performed by: EMERGENCY MEDICINE

## 2022-08-06 PROCEDURE — 99153 MOD SED SAME PHYS/QHP EA: CPT | Performed by: INTERNAL MEDICINE

## 2022-08-06 PROCEDURE — 71045 X-RAY EXAM CHEST 1 VIEW: CPT

## 2022-08-06 PROCEDURE — 25010000002 BH (CUPID ONLY) ADENOSINE 6 MG/100ML MIXTURE: Performed by: INTERNAL MEDICINE

## 2022-08-06 PROCEDURE — C1753 CATH, INTRAVAS ULTRASOUND: HCPCS | Performed by: INTERNAL MEDICINE

## 2022-08-06 PROCEDURE — 99283 EMERGENCY DEPT VISIT LOW MDM: CPT

## 2022-08-06 PROCEDURE — 4A023N7 MEASUREMENT OF CARDIAC SAMPLING AND PRESSURE, LEFT HEART, PERCUTANEOUS APPROACH: ICD-10-PCS | Performed by: INTERNAL MEDICINE

## 2022-08-06 PROCEDURE — 85025 COMPLETE CBC W/AUTO DIFF WBC: CPT

## 2022-08-06 PROCEDURE — 25010000002 MORPHINE PER 10 MG: Performed by: INTERNAL MEDICINE

## 2022-08-06 PROCEDURE — C1894 INTRO/SHEATH, NON-LASER: HCPCS | Performed by: INTERNAL MEDICINE

## 2022-08-06 PROCEDURE — B2151ZZ FLUOROSCOPY OF LEFT HEART USING LOW OSMOLAR CONTRAST: ICD-10-PCS | Performed by: INTERNAL MEDICINE

## 2022-08-06 DEVICE — XIENCE SKYPOINT™ EVEROLIMUS ELUTING CORONARY STENT SYSTEM 4.00 MM X 18 MM / RAPID-EXCHANGE
Type: IMPLANTABLE DEVICE | Site: CORONARY | Status: FUNCTIONAL
Brand: XIENCE SKYPOINT™

## 2022-08-06 RX ORDER — PRASUGREL 10 MG/1
10 TABLET, FILM COATED ORAL DAILY
Status: DISCONTINUED | OUTPATIENT
Start: 2022-08-07 | End: 2022-08-08 | Stop reason: HOSPADM

## 2022-08-06 RX ORDER — HEPARIN SODIUM 10000 [USP'U]/100ML
12 INJECTION, SOLUTION INTRAVENOUS
Status: DISCONTINUED | OUTPATIENT
Start: 2022-08-06 | End: 2022-08-06 | Stop reason: HOSPADM

## 2022-08-06 RX ORDER — CLOPIDOGREL BISULFATE 75 MG/1
600 TABLET ORAL ONCE
Status: COMPLETED | OUTPATIENT
Start: 2022-08-06 | End: 2022-08-06

## 2022-08-06 RX ORDER — SODIUM CHLORIDE 9 MG/ML
INJECTION, SOLUTION INTRAVENOUS CONTINUOUS PRN
Status: COMPLETED | OUTPATIENT
Start: 2022-08-06 | End: 2022-08-06

## 2022-08-06 RX ORDER — CARVEDILOL 3.12 MG/1
3.12 TABLET ORAL EVERY 12 HOURS SCHEDULED
Status: DISCONTINUED | OUTPATIENT
Start: 2022-08-06 | End: 2022-08-06

## 2022-08-06 RX ORDER — CARVEDILOL 6.25 MG/1
6.25 TABLET ORAL EVERY 12 HOURS SCHEDULED
Status: DISCONTINUED | OUTPATIENT
Start: 2022-08-07 | End: 2022-08-07

## 2022-08-06 RX ORDER — FENTANYL CITRATE 50 UG/ML
INJECTION, SOLUTION INTRAMUSCULAR; INTRAVENOUS AS NEEDED
Status: DISCONTINUED | OUTPATIENT
Start: 2022-08-06 | End: 2022-08-06 | Stop reason: HOSPADM

## 2022-08-06 RX ORDER — HEPARIN SODIUM 5000 [USP'U]/ML
5000 INJECTION, SOLUTION INTRAVENOUS; SUBCUTANEOUS AS NEEDED
Status: DISCONTINUED | OUTPATIENT
Start: 2022-08-06 | End: 2022-08-06 | Stop reason: HOSPADM

## 2022-08-06 RX ORDER — MIDAZOLAM HYDROCHLORIDE 1 MG/ML
INJECTION INTRAMUSCULAR; INTRAVENOUS AS NEEDED
Status: DISCONTINUED | OUTPATIENT
Start: 2022-08-06 | End: 2022-08-06 | Stop reason: HOSPADM

## 2022-08-06 RX ORDER — NALOXONE HCL 0.4 MG/ML
0.4 VIAL (ML) INJECTION
Status: DISCONTINUED | OUTPATIENT
Start: 2022-08-06 | End: 2022-08-08 | Stop reason: HOSPADM

## 2022-08-06 RX ORDER — EPTIFIBATIDE 0.75 MG/ML
2 INJECTION, SOLUTION INTRAVENOUS CONTINUOUS
Status: DISPENSED | OUTPATIENT
Start: 2022-08-06 | End: 2022-08-07

## 2022-08-06 RX ORDER — LOSARTAN POTASSIUM 25 MG/1
25 TABLET ORAL
Status: DISCONTINUED | OUTPATIENT
Start: 2022-08-06 | End: 2022-08-07

## 2022-08-06 RX ORDER — EPTIFIBATIDE 0.75 MG/ML
INJECTION, SOLUTION INTRAVENOUS CONTINUOUS PRN
Status: COMPLETED | OUTPATIENT
Start: 2022-08-06 | End: 2022-08-06

## 2022-08-06 RX ORDER — HYDROCODONE BITARTRATE AND ACETAMINOPHEN 5; 325 MG/1; MG/1
1 TABLET ORAL EVERY 4 HOURS PRN
Status: DISCONTINUED | OUTPATIENT
Start: 2022-08-06 | End: 2022-08-08 | Stop reason: HOSPADM

## 2022-08-06 RX ORDER — SODIUM CHLORIDE 9 MG/ML
100 INJECTION, SOLUTION INTRAVENOUS CONTINUOUS
Status: ACTIVE | OUTPATIENT
Start: 2022-08-06 | End: 2022-08-07

## 2022-08-06 RX ORDER — ASPIRIN 81 MG/1
81 TABLET, CHEWABLE ORAL DAILY
Status: DISCONTINUED | OUTPATIENT
Start: 2022-08-07 | End: 2022-08-08 | Stop reason: HOSPADM

## 2022-08-06 RX ORDER — HEPARIN SODIUM 10000 [USP'U]/ML
4000 INJECTION, SOLUTION INTRAVENOUS; SUBCUTANEOUS ONCE
Status: COMPLETED | OUTPATIENT
Start: 2022-08-06 | End: 2022-08-06

## 2022-08-06 RX ORDER — DEXTROSE MONOHYDRATE 25 G/50ML
25 INJECTION, SOLUTION INTRAVENOUS
Status: DISCONTINUED | OUTPATIENT
Start: 2022-08-06 | End: 2022-08-08 | Stop reason: HOSPADM

## 2022-08-06 RX ORDER — LIDOCAINE HYDROCHLORIDE 20 MG/ML
INJECTION, SOLUTION INFILTRATION; PERINEURAL AS NEEDED
Status: DISCONTINUED | OUTPATIENT
Start: 2022-08-06 | End: 2022-08-06 | Stop reason: HOSPADM

## 2022-08-06 RX ORDER — NICOTINE POLACRILEX 4 MG
15 LOZENGE BUCCAL
Status: DISCONTINUED | OUTPATIENT
Start: 2022-08-06 | End: 2022-08-08 | Stop reason: HOSPADM

## 2022-08-06 RX ORDER — ASPIRIN 81 MG/1
324 TABLET, CHEWABLE ORAL ONCE
Status: COMPLETED | OUTPATIENT
Start: 2022-08-06 | End: 2022-08-06

## 2022-08-06 RX ORDER — SODIUM NITROPRUSSIDE 25 MG/ML
INJECTION INTRAVENOUS AS NEEDED
Status: DISCONTINUED | OUTPATIENT
Start: 2022-08-06 | End: 2022-08-06 | Stop reason: HOSPADM

## 2022-08-06 RX ORDER — HEPARIN SODIUM 5000 [USP'U]/ML
30 INJECTION, SOLUTION INTRAVENOUS; SUBCUTANEOUS AS NEEDED
Status: DISCONTINUED | OUTPATIENT
Start: 2022-08-06 | End: 2022-08-06 | Stop reason: HOSPADM

## 2022-08-06 RX ORDER — INSULIN LISPRO 100 [IU]/ML
0-9 INJECTION, SOLUTION INTRAVENOUS; SUBCUTANEOUS
Status: DISCONTINUED | OUTPATIENT
Start: 2022-08-06 | End: 2022-08-08 | Stop reason: HOSPADM

## 2022-08-06 RX ORDER — MORPHINE SULFATE 2 MG/ML
1 INJECTION, SOLUTION INTRAMUSCULAR; INTRAVENOUS EVERY 4 HOURS PRN
Status: DISCONTINUED | OUTPATIENT
Start: 2022-08-06 | End: 2022-08-08 | Stop reason: HOSPADM

## 2022-08-06 RX ORDER — HEPARIN SODIUM 1000 [USP'U]/ML
INJECTION, SOLUTION INTRAVENOUS; SUBCUTANEOUS AS NEEDED
Status: DISCONTINUED | OUTPATIENT
Start: 2022-08-06 | End: 2022-08-06 | Stop reason: HOSPADM

## 2022-08-06 RX ORDER — SODIUM CHLORIDE 0.9 % (FLUSH) 0.9 %
10 SYRINGE (ML) INJECTION AS NEEDED
Status: DISCONTINUED | OUTPATIENT
Start: 2022-08-06 | End: 2022-08-06 | Stop reason: HOSPADM

## 2022-08-06 RX ORDER — ACETAMINOPHEN 325 MG/1
650 TABLET ORAL EVERY 4 HOURS PRN
Status: DISCONTINUED | OUTPATIENT
Start: 2022-08-06 | End: 2022-08-08 | Stop reason: HOSPADM

## 2022-08-06 RX ORDER — ATORVASTATIN CALCIUM 20 MG/1
40 TABLET, FILM COATED ORAL NIGHTLY
Status: DISCONTINUED | OUTPATIENT
Start: 2022-08-06 | End: 2022-08-08 | Stop reason: HOSPADM

## 2022-08-06 RX ORDER — CARVEDILOL 3.12 MG/1
3.12 TABLET ORAL ONCE
Status: COMPLETED | OUTPATIENT
Start: 2022-08-07 | End: 2022-08-07

## 2022-08-06 RX ADMIN — CARVEDILOL 3.12 MG: 3.12 TABLET, FILM COATED ORAL at 20:33

## 2022-08-06 RX ADMIN — SODIUM CHLORIDE 100 ML/HR: 9 INJECTION, SOLUTION INTRAVENOUS at 18:23

## 2022-08-06 RX ADMIN — LOSARTAN POTASSIUM 25 MG: 25 TABLET, FILM COATED ORAL at 20:33

## 2022-08-06 RX ADMIN — ATORVASTATIN CALCIUM 40 MG: 20 TABLET, FILM COATED ORAL at 20:33

## 2022-08-06 RX ADMIN — CLOPIDOGREL BISULFATE 600 MG: 75 TABLET ORAL at 14:59

## 2022-08-06 RX ADMIN — ASPIRIN 81 MG CHEWABLE TABLET 324 MG: 81 TABLET CHEWABLE at 14:48

## 2022-08-06 RX ADMIN — HEPARIN SODIUM 4000 UNITS: 10000 INJECTION INTRAVENOUS; SUBCUTANEOUS at 14:59

## 2022-08-06 RX ADMIN — MORPHINE SULFATE 1 MG: 2 INJECTION, SOLUTION INTRAMUSCULAR; INTRAVENOUS at 21:56

## 2022-08-06 RX ADMIN — HEPARIN SODIUM 12 UNITS/KG/HR: 10000 INJECTION, SOLUTION INTRAVENOUS at 14:57

## 2022-08-06 RX ADMIN — EPTIFIBATIDE 2 MCG/KG/MIN: 0.75 INJECTION INTRAVENOUS at 20:56

## 2022-08-06 RX ADMIN — HYDROCODONE BITARTRATE AND ACETAMINOPHEN 1 TABLET: 5; 325 TABLET ORAL at 17:56

## 2022-08-06 NOTE — H&P
[facility] History And Physical Assessment    Patient Name: Arnav Celaya  Age/Sex: 47 y.o. male  : 1975  MRN: [id number]    Date of Hospital Admission: 2022  Date of Encounter Visit: 22  Encounter Provider: Donna Mendoza MD  Place of Service: [facility] CARDIOLOGY  Patient Care Team:  Lynn Weber MD as PCP - General (Internal Medicine & Pediatrics)    Subjective:     Chief Complaint: Inferior myocardial infarction    History of Present Illness:  Arnav Celaya is a 47 y.o. male hypertension, per lipidemia, diabetes mellitus type 2, prior tobacco use, who presents with an inferior myocardial infarction.    The patient reports that he was out playing football today when he had a sudden onset chest and back pain.  This was associated with some increase shortness of breath.  He ended up driving himself to the [facility] emergency room where an EKG was performed showing evidence of inferior ST elevations concerning for an inferior myocardial infarction.  He was treated with heparin infusion and given a loading dose of clopidogrel and transferred emergently to [facility].    Following his arrival to the hospital he was brought directly to the cardiac catheterization laboratory where coronary angiograms revealed a 95% thrombotic stenosis of his mid right coronary artery along with distal embolization of a large posterolateral branch.  He underwent drug-eluting stent placement of the mid right coronary artery with a Xience Skypoint 4.0 x 18 mm stent (postdilated with a 5.0 mm balloon).  The posterolateral distal embolization was treated with balloon angioplasty and thrombectomy initially without much improvement in flow.  Patient was started on Integrilin infusion and given several rounds of intracoronary nitroglycerin, nitroprusside, and adenosine.  The final images of the right coronary artery there appear to be some  improvement in flow in the posterior lateral branch.  He continues to have inferior ST elevations but improvement in his symptoms.  He was otherwise hemodynamically stable throughout the procedure.  Left ventriculogram does show inferior wall motion abnormalities with mild to moderately depressed left ventricular systolic function.    Patient denies any prior cardiac history or family history of cardiac disease.  He does reports that he previously smoked for quite a few years ago.  Denies any symptoms prior to today.    Past Medical History:  Past Medical History:   Diagnosis Date   • Hemochromatosis        No past surgical history on file.    Home Medications:   Medications Prior to Admission   Medication Sig Dispense Refill Last Dose   • amLODIPine (NORVASC) 5 MG tablet Take 1 tablet by mouth Daily. Schedule labs and appointment. 90 tablet 1    • cetirizine (zyrTEC) 10 MG tablet Take 1 tablet by mouth Daily. 90 tablet 3    • fluticasone (Flonase) 50 MCG/ACT nasal spray 2 sprays into the nostril(s) as directed by provider Daily. 48 g 3    • glucose blood test strip Use as instructed daily 100 each 1    • glucose monitor monitoring kit 1 each As Needed (daily to check glucose). 1 each 0    • Lanc[employer] misc 1 each Daily As Needed (to check glucose). 100 each 1    • metFORMIN ER (Glucophage XR) 500 MG 24 hr tablet Take 2 tablets by mouth Daily With Breakfast. 180 tablet 1        Allergies:  Allergies   Allergen Reactions   • Lisinopril Cough   • Omeprazole Other (See Comments)     Poss rash       Past Social History:  Social History     Socioeconomic History   • Marital status:    Tobacco Use   • Smoking status: Former Smoker     Packs/day: 0.25     Years: 10.00     Pack years: 2.50     Types: Cigarettes     Quit date: 2014     Years since quittin.6   • Smokeless tobacco: Never Used   • Tobacco comment: Was on and off smoking until around 2019   Substance and Sexual Activity   • Alcohol use: Yes      Alcohol/week: 1.0 standard drink     Types: 1 Cans of beer per week     Comment: socially   • Drug use: No   • Sexual activity: Defer       Past Family History:  No family history on file.    Review of Systems:   All systems reviewed. Pertinent positives identified in HPI. All other systems are negative.    Objective:   Temp:  [98.9 °F (37.2 °C)] 98.9 °F (37.2 °C)  Heart Rate:  [85] 85  Resp:  [13-25] 24  BP: (140)/(102) 140/102  No intake or output data in the 24 hours ending 08/06/22 1720  There is no height or weight on file to calculate BMI.  There were no vitals filed for this visit.  Weight change:     Physical Exam:   General Appearance:    Alert, cooperative, in no acute distress   Head:    Normocephalic, without obvious abnormality, atraumatic   Eyes:            Lids and lashes normal, conjunctivae and sclerae normal, no   icterus, no pallor, corneas clear, PERRLA   Ears:    Ears appear intact with no abnormalities noted   Neck:   No adenopathy, supple, trachea midline, no thyromegaly, no   carotid bruit, no JVD   Lungs:     Clear to auscultation,respirations regular, even and unlabored    Heart:    Regular rhythm and normal rate, normal S1 and S2, no murmur, no gallop, no rub, no click   Chest Wall:    No abnormalities observed   Abdomen:     Normal bowel sounds, no masses, no organomegaly, soft        non-tender, non-distended, no guarding, no rebound  tenderness   Extremities:   Moves all extremities well, no edema, no cyanosis, no redness   Pulses:   Pulses palpable and equal bilaterally. Normal radial, carotid, femoral, dorsalis pedis and posterior tibial pulses bilaterally. Normal abdominal aorta   Skin:  Psychiatric:   No bleeding, bruising or rash    Alert and oriented x 3, normal mood and affect         Lab Review:   Results from last 7 days   Lab Units 08/06/22  1453   SODIUM mmol/L 133*   POTASSIUM mmol/L 3.5   CHLORIDE mmol/L 95*   CO2 mmol/L 19.5*   BUN mg/dL 16   CREATININE mg/dL 1.22    GLUCOSE mg/dL 220*   CALCIUM mg/dL 10.4   AST (SGOT) U/L 22   ALT (SGPT) U/L 32     Results from last 7 days   Lab Units 08/06/22  1453   TROPONIN T ng/mL <0.010     Results from last 7 days   Lab Units 08/06/22  1453   WBC 10*3/mm3 10.29   HEMOGLOBIN g/dL 17.4   HEMATOCRIT % 48.9   PLATELETS 10*3/mm3 389     Results from last 7 days   Lab Units 08/06/22  1453   INR  0.98   APTT seconds 22.2*     I personally viewed and interpreted the patient's EKG    Assessment/Plan:     1.  Inferior myocardial infarction.  Now status post drug-eluting stent placement of the mid right coronary artery.  He presented with distal embolization of his posterolateral branch likely from his mid lesion.  Some improvement in flow by the end of the procedure.  Suspect this will continue to improve with time and medical treatment including with Integrilin infusion.  2.  Coronary artery disease.  His remaining vessels show mild nonobstructive disease.  3.  Ischemic cardiomyopathy.  EF appeared to be around 35 to 40% by left ventriculogram with inferior wall motion abnormalities.  4.  Hypertension.  Poorly controlled.  5.  Hyperlipidemia.  Untreated.  6.  Diabetes mellitus type 2.  On metformin as an outpatient.    - Continue Integrilin infusion for 12 hours total.  - Continue dual antiplatelet therapy.  We will transition from clopidogrel to prasugrel tomorrow.  - Start carvedilol and losartan for hypertension and cardiomyopathy.  - Start statin therapy.  - Start insulin sliding scale and hold metformin.  - Check echocardiogram.    Donna Mendoza MD  08/06/22  17:20 EDT

## 2022-08-06 NOTE — PLAN OF CARE
Goal Outcome Evaluation:   Pt arrived from cath lab around 1745. VSS. Slightly hypertensive, waiting losartan from pharmacy. Integrillin currently infusing. Will CTM. R radial site, currently releasing notches.

## 2022-08-06 NOTE — ED PROVIDER NOTES
Subjective   History of Present Illness  History of Present Illness    Chief complaint: Chest pain    Location: Substernal radiating to the back    Quality/Severity: Pressure    Timing/Onset/Duration: Acute onset at 2:00    Modifying Factors: Better with rest    Associated Symptoms: No headache.  No fever chills or cough.  No sore throat earache or nasal congestion.  Patient got diaphoretic.  He had some nausea.  No abdominal pain.  No diarrhea or burning when he urinates.    Narrative: This 47-year-old white male was playing football and began experiencing chest pain at 2 PM.  Patient has a history of diabetes, hypertension, former smoker, elevated cholesterol, no previous history of coronary artery disease, no family history of coronary artery disease.  The patient has never had a stress test or cardiac catheterization.    PCP: Audra Weber      Review of Systems   Constitutional: Positive for diaphoresis. Negative for chills and fever.   HENT: Negative for congestion, ear pain and sore throat.    Respiratory: Positive for shortness of breath. Negative for cough.    Cardiovascular: Positive for chest pain.   Gastrointestinal: Positive for nausea. Negative for abdominal pain, diarrhea and vomiting.   Musculoskeletal: Positive for back pain.   Neurological: Negative for headaches.        Medication List      CONTINUE taking these medications    glucose monitor monitoring kit  1 each As Needed (daily to check glucose).     Lancet Devices misc  1 each Daily As Needed (to check glucose).        ASK your doctor about these medications    amLODIPine 5 MG tablet  Commonly known as: NORVASC  Take 1 tablet by mouth Daily. Schedule labs and appointment.     cetirizine 10 MG tablet  Commonly known as: zyrTEC  Take 1 tablet by mouth Daily.     fluticasone 50 MCG/ACT nasal spray  Commonly known as: Flonase  2 sprays into the nostril(s) as directed by provider Daily.     glucose blood test strip  Use as instructed daily      metFORMIN  MG 24 hr tablet  Commonly known as: Glucophage XR  Take 2 tablets by mouth Daily With Breakfast.            Past Medical History:   Diagnosis Date   • Hemochromatosis        Allergies   Allergen Reactions   • Lisinopril Cough   • Omeprazole Other (See Comments)     Poss rash       History reviewed. No pertinent surgical history.    History reviewed. No pertinent family history.    Social History     Socioeconomic History   • Marital status:    Tobacco Use   • Smoking status: Former Smoker     Packs/day: 0.25     Years: 10.00     Pack years: 2.50     Types: Cigarettes     Quit date: 2014     Years since quittin.6   • Smokeless tobacco: Never Used   • Tobacco comment: Was on and off smoking until around 2019   Substance and Sexual Activity   • Alcohol use: Yes     Alcohol/week: 1.0 standard drink     Types: 1 Cans of beer per week     Comment: socially   • Drug use: No   • Sexual activity: Defer           Objective   Physical Exam  Vitals (The temperature was 98.9 °F, pulse 85, respirations 20, /102, room air pulse ox 97%.) and nursing note reviewed.   Constitutional:       Appearance: He is well-developed.   HENT:      Head: Normocephalic and atraumatic.      Mouth/Throat:      Mouth: Mucous membranes are moist.   Cardiovascular:      Rate and Rhythm: Normal rate and regular rhythm.      Heart sounds: No murmur heard.    No friction rub. No gallop.   Pulmonary:      Effort: Pulmonary effort is normal.      Breath sounds: Normal breath sounds.   Chest:      Chest wall: No tenderness.   Abdominal:      General: Bowel sounds are normal.      Palpations: Abdomen is soft.   Musculoskeletal:      Cervical back: Normal range of motion and neck supple.      Right lower leg: No edema.      Left lower leg: No edema.   Skin:     General: Skin is warm and dry.   Neurological:      General: No focal deficit present.      Mental Status: He is alert and oriented to person, place, and time.          Procedures           ED Course  ED Course as of 08/06/22 1546   Sat Aug 06, 2022   1544 The PTT is 22.  The troponin is less than 0.010.    The white blood cell count is 10.  The hemoglobin is 17 and hematocrit is 48.  The platelet count is 389,000.  The CBC is otherwise unremarkable.  The serum glucose is 220.  The sodium is 133.  The chloride 95.  The CO2 is 19.5.  The anion gap is 18.  The CMP is otherwise unremarkable. [RC]      ED Course User Index  [RC] Teofilo Bowles MD      14:58 EDT, 08/06/22:  The patient on the initial EKG obtained at 1441 and read by me at 1443 shows a run of V. tach with evidence of STEMI on an EKG that has a wandering baseline.  The patient had a repeat EKG that shows an inferior posterior anterior lateral STEMI.  The patient is in sinus rhythm.  The AK is prolonged at 211 ms.  The QRS and QT intervals are unremarkable.  There is no ectopy.  This EKG was obtained at 1450.  The right-sided EKG was obtained at 1453.    The patient has been given 4 baby aspirin's.  The patient has been given Plavix.  The patient has been given a heparin bolus.  The STEMI protocol was activated immediately after I reviewed the EKG.    I spoke with Dr. Mendoza who will accept the patient at [facility] to the Cath Lab.    The patient's provisional diagnosis of acute STEMI was discussed with him.  The patient will be emergently transferred to the Cath Lab at [facility] for cardiac catheterization with possible balloon angioplasty.                                     MDM    Final diagnoses:   ST elevation myocardial infarction (STEMI), unspecified artery (HCC)       ED Disposition  ED Disposition     None          No follow-up provider specified.       Medication List      No changes were made to your prescriptions during this visit.          Teofilo Bowles MD  08/06/22 1546

## 2022-08-07 ENCOUNTER — APPOINTMENT (OUTPATIENT)
Dept: CARDIOLOGY | Facility: HOSPITAL | Age: 47
End: 2022-08-07

## 2022-08-07 LAB
ACT BLD: 266 SECONDS (ref 82–152)
ANION GAP SERPL CALCULATED.3IONS-SCNC: 14.7 MMOL/L (ref 5–15)
AORTIC DIMENSIONLESS INDEX: 0.7 (DI)
BH CV ECHO MEAS - ACS: 1.87 CM
BH CV ECHO MEAS - AO MAX PG: 6.9 MMHG
BH CV ECHO MEAS - AO MEAN PG: 4 MMHG
BH CV ECHO MEAS - AO V2 MAX: 131.5 CM/SEC
BH CV ECHO MEAS - AO V2 VTI: 25.6 CM
BH CV ECHO MEAS - AVA(I,D): 3.2 CM2
BH CV ECHO MEAS - EDV(CUBED): 156.1 ML
BH CV ECHO MEAS - EDV(MOD-SP2): 139 ML
BH CV ECHO MEAS - EDV(MOD-SP4): 128 ML
BH CV ECHO MEAS - EF(MOD-BP): 55 %
BH CV ECHO MEAS - EF(MOD-SP2): 48.9 %
BH CV ECHO MEAS - EF(MOD-SP4): 57 %
BH CV ECHO MEAS - ESV(CUBED): 62.4 ML
BH CV ECHO MEAS - ESV(MOD-SP2): 71 ML
BH CV ECHO MEAS - ESV(MOD-SP4): 55 ML
BH CV ECHO MEAS - FS: 26.3 %
BH CV ECHO MEAS - IVS/LVPW: 0.85 CM
BH CV ECHO MEAS - IVSD: 1.38 CM
BH CV ECHO MEAS - LAT PEAK E' VEL: 9.8 CM/SEC
BH CV ECHO MEAS - LV DIASTOLIC VOL/BSA (35-75): 56.1 CM2
BH CV ECHO MEAS - LV MASS(C)D: 361 GRAMS
BH CV ECHO MEAS - LV MAX PG: 2.9 MMHG
BH CV ECHO MEAS - LV MEAN PG: 1.61 MMHG
BH CV ECHO MEAS - LV SYSTOLIC VOL/BSA (12-30): 24.1 CM2
BH CV ECHO MEAS - LV V1 MAX: 85.3 CM/SEC
BH CV ECHO MEAS - LV V1 VTI: 16.7 CM
BH CV ECHO MEAS - LVIDD: 5.4 CM
BH CV ECHO MEAS - LVIDS: 4 CM
BH CV ECHO MEAS - LVOT AREA: 5 CM2
BH CV ECHO MEAS - LVOT DIAM: 2.5 CM
BH CV ECHO MEAS - LVPWD: 1.3 CM
BH CV ECHO MEAS - MED PEAK E' VEL: 6.1 CM/SEC
BH CV ECHO MEAS - MV A DUR: 0.09 SEC
BH CV ECHO MEAS - MV A MAX VEL: 63.8 CM/SEC
BH CV ECHO MEAS - MV DEC SLOPE: 478.8 CM/SEC2
BH CV ECHO MEAS - MV DEC TIME: 0.18 MSEC
BH CV ECHO MEAS - MV E MAX VEL: 88.3 CM/SEC
BH CV ECHO MEAS - MV E/A: 1.38
BH CV ECHO MEAS - MV MAX PG: 3.4 MMHG
BH CV ECHO MEAS - MV MEAN PG: 1.51 MMHG
BH CV ECHO MEAS - MV P1/2T: 58.1 MSEC
BH CV ECHO MEAS - MV V2 VTI: 18.7 CM
BH CV ECHO MEAS - MVA(P1/2T): 3.8 CM2
BH CV ECHO MEAS - MVA(VTI): 4.4 CM2
BH CV ECHO MEAS - PA ACC TIME: 0.11 SEC
BH CV ECHO MEAS - PA PR(ACCEL): 31.5 MMHG
BH CV ECHO MEAS - PA V2 MAX: 78.7 CM/SEC
BH CV ECHO MEAS - PULM DIAS VEL: 49.3 CM/SEC
BH CV ECHO MEAS - PULM S/D: 0.89
BH CV ECHO MEAS - PULM SYS VEL: 43.7 CM/SEC
BH CV ECHO MEAS - RAP SYSTOLE: 3 MMHG
BH CV ECHO MEAS - RV MAX PG: 1.26 MMHG
BH CV ECHO MEAS - RV V1 MAX: 56.1 CM/SEC
BH CV ECHO MEAS - RV V1 VTI: 13.4 CM
BH CV ECHO MEAS - RVSP: 15.2 MMHG
BH CV ECHO MEAS - SI(MOD-SP2): 29.8 ML/M2
BH CV ECHO MEAS - SI(MOD-SP4): 32 ML/M2
BH CV ECHO MEAS - SV(LVOT): 83.1 ML
BH CV ECHO MEAS - SV(MOD-SP2): 68 ML
BH CV ECHO MEAS - SV(MOD-SP4): 73 ML
BH CV ECHO MEAS - TAPSE (>1.6): 2.05 CM
BH CV ECHO MEAS - TR MAX PG: 12.2 MMHG
BH CV ECHO MEAS - TR MAX VEL: 174.9 CM/SEC
BH CV ECHO MEASUREMENTS AVERAGE E/E' RATIO: 11.11
BH CV XLRA - RV BASE: 2.3 CM
BH CV XLRA - RV LENGTH: 8 CM
BH CV XLRA - RV MID: 1.94 CM
BH CV XLRA - TDI S': 9.9 CM/SEC
BUN SERPL-MCNC: 12 MG/DL (ref 6–20)
BUN/CREAT SERPL: 16.7 (ref 7–25)
CALCIUM SPEC-SCNC: 8.9 MG/DL (ref 8.6–10.5)
CHLORIDE SERPL-SCNC: 97 MMOL/L (ref 98–107)
CHOLEST SERPL-MCNC: 167 MG/DL (ref 0–200)
CO2 SERPL-SCNC: 21.3 MMOL/L (ref 22–29)
CREAT SERPL-MCNC: 0.72 MG/DL (ref 0.76–1.27)
DEPRECATED RDW RBC AUTO: 42.1 FL (ref 37–54)
EGFRCR SERPLBLD CKD-EPI 2021: 112.7 ML/MIN/1.73
ERYTHROCYTE [DISTWIDTH] IN BLOOD BY AUTOMATED COUNT: 13 % (ref 12.3–15.4)
GLUCOSE BLDC GLUCOMTR-MCNC: 194 MG/DL (ref 70–130)
GLUCOSE BLDC GLUCOMTR-MCNC: 210 MG/DL (ref 70–130)
GLUCOSE SERPL-MCNC: 175 MG/DL (ref 65–99)
HBA1C MFR BLD: 7.4 % (ref 4.8–5.6)
HCT VFR BLD AUTO: 43.9 % (ref 37.5–51)
HDLC SERPL-MCNC: 33 MG/DL (ref 40–60)
HGB BLD-MCNC: 15.8 G/DL (ref 13–17.7)
LDLC SERPL CALC-MCNC: 101 MG/DL (ref 0–100)
LDLC/HDLC SERPL: 2.91 {RATIO}
LEFT ATRIUM VOLUME INDEX: 26.4 ML/M2
MAXIMAL PREDICTED HEART RATE: 172 BPM
MCH RBC QN AUTO: 32.4 PG (ref 26.6–33)
MCHC RBC AUTO-ENTMCNC: 36 G/DL (ref 31.5–35.7)
MCV RBC AUTO: 90 FL (ref 79–97)
PLATELET # BLD AUTO: 322 10*3/MM3 (ref 140–450)
PMV BLD AUTO: 9.1 FL (ref 6–12)
POTASSIUM SERPL-SCNC: 3.7 MMOL/L (ref 3.5–5.2)
QT INTERVAL: 365 MS
RBC # BLD AUTO: 4.88 10*6/MM3 (ref 4.14–5.8)
SARS-COV-2 RNA RESP QL NAA+PROBE: NOT DETECTED
SINUS: 2.8 CM
SODIUM SERPL-SCNC: 133 MMOL/L (ref 136–145)
STRESS TARGET HR: 146 BPM
TRIGL SERPL-MCNC: 190 MG/DL (ref 0–150)
TROPONIN T SERPL-MCNC: 3.78 NG/ML (ref 0–0.03)
VLDLC SERPL-MCNC: 33 MG/DL (ref 5–40)
WBC NRBC COR # BLD: 14.09 10*3/MM3 (ref 3.4–10.8)

## 2022-08-07 PROCEDURE — 93306 TTE W/DOPPLER COMPLETE: CPT

## 2022-08-07 PROCEDURE — 93306 TTE W/DOPPLER COMPLETE: CPT | Performed by: INTERNAL MEDICINE

## 2022-08-07 PROCEDURE — 85027 COMPLETE CBC AUTOMATED: CPT | Performed by: INTERNAL MEDICINE

## 2022-08-07 PROCEDURE — 99232 SBSQ HOSP IP/OBS MODERATE 35: CPT | Performed by: INTERNAL MEDICINE

## 2022-08-07 PROCEDURE — 63710000001 INSULIN LISPRO (HUMAN) PER 5 UNITS: Performed by: INTERNAL MEDICINE

## 2022-08-07 PROCEDURE — 83036 HEMOGLOBIN GLYCOSYLATED A1C: CPT | Performed by: INTERNAL MEDICINE

## 2022-08-07 PROCEDURE — U0003 INFECTIOUS AGENT DETECTION BY NUCLEIC ACID (DNA OR RNA); SEVERE ACUTE RESPIRATORY SYNDROME CORONAVIRUS 2 (SARS-COV-2) (CORONAVIRUS DISEASE [COVID-19]), AMPLIFIED PROBE TECHNIQUE, MAKING USE OF HIGH THROUGHPUT TECHNOLOGIES AS DESCRIBED BY CMS-2020-01-R: HCPCS | Performed by: INTERNAL MEDICINE

## 2022-08-07 PROCEDURE — 84484 ASSAY OF TROPONIN QUANT: CPT | Performed by: INTERNAL MEDICINE

## 2022-08-07 PROCEDURE — 82962 GLUCOSE BLOOD TEST: CPT

## 2022-08-07 PROCEDURE — 80061 LIPID PANEL: CPT | Performed by: INTERNAL MEDICINE

## 2022-08-07 PROCEDURE — 25010000002 EPTIFIBATIDE PER 5 MG: Performed by: INTERNAL MEDICINE

## 2022-08-07 PROCEDURE — 80048 BASIC METABOLIC PNL TOTAL CA: CPT | Performed by: INTERNAL MEDICINE

## 2022-08-07 PROCEDURE — 93005 ELECTROCARDIOGRAM TRACING: CPT | Performed by: INTERNAL MEDICINE

## 2022-08-07 RX ORDER — CARVEDILOL 25 MG/1
25 TABLET ORAL EVERY 12 HOURS SCHEDULED
Status: DISCONTINUED | OUTPATIENT
Start: 2022-08-07 | End: 2022-08-08 | Stop reason: HOSPADM

## 2022-08-07 RX ORDER — LOSARTAN POTASSIUM 100 MG/1
100 TABLET ORAL
Status: DISCONTINUED | OUTPATIENT
Start: 2022-08-08 | End: 2022-08-07

## 2022-08-07 RX ORDER — LOSARTAN POTASSIUM 50 MG/1
50 TABLET ORAL
Status: DISCONTINUED | OUTPATIENT
Start: 2022-08-08 | End: 2022-08-08 | Stop reason: HOSPADM

## 2022-08-07 RX ORDER — SPIRONOLACTONE 25 MG/1
25 TABLET ORAL DAILY
Status: DISCONTINUED | OUTPATIENT
Start: 2022-08-07 | End: 2022-08-08 | Stop reason: HOSPADM

## 2022-08-07 RX ORDER — LOSARTAN POTASSIUM 50 MG/1
50 TABLET ORAL ONCE
Status: COMPLETED | OUTPATIENT
Start: 2022-08-07 | End: 2022-08-07

## 2022-08-07 RX ADMIN — INSULIN LISPRO 4 UNITS: 100 INJECTION, SOLUTION INTRAVENOUS; SUBCUTANEOUS at 17:38

## 2022-08-07 RX ADMIN — ASPIRIN 81 MG: 81 TABLET, CHEWABLE ORAL at 07:36

## 2022-08-07 RX ADMIN — HYDROCODONE BITARTRATE AND ACETAMINOPHEN 1 TABLET: 5; 325 TABLET ORAL at 21:54

## 2022-08-07 RX ADMIN — CARVEDILOL 6.25 MG: 6.25 TABLET, FILM COATED ORAL at 07:37

## 2022-08-07 RX ADMIN — CARVEDILOL 25 MG: 25 TABLET, FILM COATED ORAL at 09:15

## 2022-08-07 RX ADMIN — EPTIFIBATIDE 2 MCG/KG/MIN: 0.75 INJECTION INTRAVENOUS at 03:45

## 2022-08-07 RX ADMIN — HYDROCODONE BITARTRATE AND ACETAMINOPHEN 1 TABLET: 5; 325 TABLET ORAL at 14:47

## 2022-08-07 RX ADMIN — CARVEDILOL 3.12 MG: 3.12 TABLET, FILM COATED ORAL at 00:00

## 2022-08-07 RX ADMIN — INSULIN LISPRO 2 UNITS: 100 INJECTION, SOLUTION INTRAVENOUS; SUBCUTANEOUS at 13:12

## 2022-08-07 RX ADMIN — ATORVASTATIN CALCIUM 40 MG: 20 TABLET, FILM COATED ORAL at 21:54

## 2022-08-07 RX ADMIN — PRASUGREL 10 MG: 10 TABLET, FILM COATED ORAL at 07:37

## 2022-08-07 RX ADMIN — ACETAMINOPHEN 650 MG: 325 TABLET, FILM COATED ORAL at 07:36

## 2022-08-07 RX ADMIN — CARVEDILOL 25 MG: 25 TABLET, FILM COATED ORAL at 21:54

## 2022-08-07 RX ADMIN — SPIRONOLACTONE 25 MG: 25 TABLET ORAL at 06:43

## 2022-08-07 RX ADMIN — HYDROCODONE BITARTRATE AND ACETAMINOPHEN 1 TABLET: 5; 325 TABLET ORAL at 02:33

## 2022-08-07 RX ADMIN — LOSARTAN POTASSIUM 50 MG: 50 TABLET, FILM COATED ORAL at 06:43

## 2022-08-07 NOTE — PLAN OF CARE
Pt remains in CCU as an overflow. VSS. R radial site CDI. Good urine output, no BM. Will continue to monitor.   Problem: Adult Inpatient Plan of Care  Goal: Plan of Care Review  Outcome: Ongoing, Progressing  Goal: Patient-Specific Goal (Individualized)  Outcome: Ongoing, Progressing  Goal: Absence of Hospital-Acquired Illness or Injury  Outcome: Ongoing, Progressing  Intervention: Identify and Manage Fall Risk  Recent Flowsheet Documentation  Taken 8/7/2022 1800 by Sienna Lucas RN  Safety Promotion/Fall Prevention: safety round/check completed  Taken 8/7/2022 1700 by Sienna Lucas RN  Safety Promotion/Fall Prevention: safety round/check completed  Taken 8/7/2022 1600 by Sienna Lucas RN  Safety Promotion/Fall Prevention: safety round/check completed  Taken 8/7/2022 1500 by Sienna Lucas RN  Safety Promotion/Fall Prevention: safety round/check completed  Taken 8/7/2022 1400 by Sienna Lucas RN  Safety Promotion/Fall Prevention: safety round/check completed  Taken 8/7/2022 1300 by Sienna Lucas RN  Safety Promotion/Fall Prevention: safety round/check completed  Taken 8/7/2022 0900 by Sienna Lucas RN  Safety Promotion/Fall Prevention: safety round/check completed  Taken 8/7/2022 0800 by Sienna Lucas RN  Safety Promotion/Fall Prevention: safety round/check completed  Intervention: Prevent Skin Injury  Recent Flowsheet Documentation  Taken 8/7/2022 1800 by Sienna Lucas RN  Body Position: position changed independently  Taken 8/7/2022 1700 by Sienna Lucas RN  Body Position: position changed independently  Taken 8/7/2022 1600 by Sienna Lucas RN  Body Position: position changed independently  Taken 8/7/2022 1500 by Sienna Lucas RN  Body Position: position changed independently  Taken 8/7/2022 1400 by Sienna Lucas RN  Body Position: position changed independently  Taken 8/7/2022 1200 by Sienna Lucas RN  Body Position: position changed independently  Taken 8/7/2022 0900 by Sienna Lucas RN  Body  Position: position changed independently  Taken 8/7/2022 0800 by Sienna Lucas RN  Body Position: position changed independently  Intervention: Prevent and Manage VTE (Venous Thromboembolism) Risk  Recent Flowsheet Documentation  Taken 8/7/2022 1800 by Sienna Lucas RN  Activity Management: activity adjusted per tolerance  Taken 8/7/2022 1700 by Sienna Lucas RN  Activity Management: activity adjusted per tolerance  Taken 8/7/2022 1600 by Sienna Lucas RN  Activity Management: activity adjusted per tolerance  Taken 8/7/2022 1500 by Sienna Lucas RN  Activity Management: activity adjusted per tolerance  Taken 8/7/2022 1400 by Sienna Lucas RN  Activity Management: activity adjusted per tolerance  Taken 8/7/2022 1300 by Sienna Lucas RN  Activity Management: activity adjusted per tolerance  Taken 8/7/2022 0900 by Sienna Lucas RN  Activity Management: activity adjusted per tolerance  Taken 8/7/2022 0800 by Sienna Lucas RN  Activity Management: activity adjusted per tolerance  Intervention: Prevent Infection  Recent Flowsheet Documentation  Taken 8/7/2022 1800 by Sienna Lucas RN  Infection Prevention:   environmental surveillance performed   equipment surfaces disinfected   hand hygiene promoted   personal protective equipment utilized   rest/sleep promoted   single patient room provided   visitors restricted/screened  Taken 8/7/2022 1700 by Sienna Lucas RN  Infection Prevention:   environmental surveillance performed   hand hygiene promoted   equipment surfaces disinfected   personal protective equipment utilized   rest/sleep promoted   single patient room provided   visitors restricted/screened  Taken 8/7/2022 1600 by Sienna uLcas RN  Infection Prevention:   environmental surveillance performed   equipment surfaces disinfected   hand hygiene promoted   personal protective equipment utilized   rest/sleep promoted   single patient room provided   visitors restricted/screened  Taken 8/7/2022 1500 by  Lucas, Sienna, RN  Infection Prevention:   environmental surveillance performed   hand hygiene promoted   equipment surfaces disinfected   personal protective equipment utilized   rest/sleep promoted   single patient room provided   visitors restricted/screened  Taken 8/7/2022 1400 by Sienna Lucas RN  Infection Prevention:   environmental surveillance performed   equipment surfaces disinfected   hand hygiene promoted   personal protective equipment utilized   rest/sleep promoted   single patient room provided   visitors restricted/screened  Taken 8/7/2022 1300 by Sienna Lucas RN  Infection Prevention:   environmental surveillance performed   hand hygiene promoted   equipment surfaces disinfected   personal protective equipment utilized   rest/sleep promoted   single patient room provided   visitors restricted/screened  Taken 8/7/2022 0900 by Sienna Lucas RN  Infection Prevention:   environmental surveillance performed   equipment surfaces disinfected   hand hygiene promoted   personal protective equipment utilized   rest/sleep promoted   single patient room provided   visitors restricted/screened  Taken 8/7/2022 0800 by Sienna Lucas RN  Infection Prevention:   environmental surveillance performed   equipment surfaces disinfected   hand hygiene promoted   personal protective equipment utilized   rest/sleep promoted   single patient room provided   visitors restricted/screened  Goal: Optimal Comfort and Wellbeing  Outcome: Ongoing, Progressing  Goal: Readiness for Transition of Care  Outcome: Ongoing, Progressing   Goal Outcome Evaluation:

## 2022-08-07 NOTE — PLAN OF CARE
Goal Outcome Evaluation:  Plan of Care Reviewed With: patient        Progress: no change  Outcome Evaluation: Received pt in bed. Alert and orientedx4. c/o pain to rt wrist. Comfort band in place and notches moved. Some bleeding noted, so, pressure applied and band reinforced. Bleeding controlled. Arm kept elevated. Integrilin drip infusing. BP elevated, steph DBP. Dr. Mendoza ordered exta dose of Coreg and increased daily dose. BP still remained elevated. Dr. Yousif notified and meds adjusted plus medication added for daily regimen. Dr. Mendoza also called to clarify when Integrilin drip should discontinue which will be after 12 hours. NAD. Will CTM.

## 2022-08-07 NOTE — PROGRESS NOTES
[facility] Follow Up    Chief Complaint: Follow up inferior myocardial infarction    Interval History: No further significant chest discomfort.  No shortness of breath.  Blood pressures have remained persistently elevated.    Objective:     Objective:  Temp:  [97.8 °F (36.6 °C)-98.9 °F (37.2 °C)] 98 °F (36.7 °C)  Heart Rate:  [] 87  Resp:  [13-25] 24  BP: (130-164)/() 151/102     Intake/Output Summary (Last 24 hours) at 8/7/2022 0946  Last data filed at 8/7/2022 0100  Gross per 24 hour   Intake --   Output 1275 ml   Net -1275 ml     Body mass index is 30.34 kg/m².      08/07/22  0911   Weight: 104 kg (230 lb)     Weight change:       Physical Exam:   General : Alert, cooperative, in no acute distress.  Neuro: Alert,cooperative and oriented.  Lungs: CTAB. Normal respiratory effort and rate.  CV: Regular rate and rhythm, normal S1 and S2, no murmurs, gallops or rubs.  ABD: Soft, nontender, nondistended. Positive bowel sounds.  Extr: No edema or cyanosis, moves all extremities.  Right wrist without any evidence of bleeding or hematoma.    Lab Review:   Results from last 7 days   Lab Units 08/07/22  0333 08/06/22  1453   SODIUM mmol/L 133* 133*   POTASSIUM mmol/L 3.7 3.5   CHLORIDE mmol/L 97* 95*   CO2 mmol/L 21.3* 19.5*   BUN mg/dL 12 16   CREATININE mg/dL 0.72* 1.22   GLUCOSE mg/dL 175* 220*   CALCIUM mg/dL 8.9 10.4   AST (SGOT) U/L  --  22   ALT (SGPT) U/L  --  32     Results from last 7 days   Lab Units 08/07/22  0333 08/06/22  1453   TROPONIN T ng/mL 3.780* <0.010     Results from last 7 days   Lab Units 08/07/22  0333 08/06/22  1453   WBC 10*3/mm3 14.09* 10.29   HEMOGLOBIN g/dL 15.8 17.4   HEMATOCRIT % 43.9 48.9   PLATELETS 10*3/mm3 322 389     Results from last 7 days   Lab Units 08/06/22  1453   INR  0.98   APTT seconds 22.2*         Results from last 7 days   Lab Units 08/07/22  0333   CHOLESTEROL mg/dL 167   TRIGLYCERIDES mg/dL 190*   HDL CHOL mg/dL 33*             I reviewed  the patient's new clinical results.  I personally viewed and interpreted the patient's EKG  Current Medications:   Scheduled Meds:aspirin, 81 mg, Oral, Daily  atorvastatin, 40 mg, Oral, Nightly  carvedilol, 25 mg, Oral, Q12H  insulin lispro, 0-9 Units, Subcutaneous, TID AC  [START ON 8/8/2022] losartan, 100 mg, Oral, Q24H  prasugrel, 10 mg, Oral, Daily  spironolactone, 25 mg, Oral, Daily      Continuous Infusions:     Allergies:  Allergies   Allergen Reactions   • Lisinopril Cough   • Omeprazole Other (See Comments)     Poss rash       Assessment/Plan:     1.  Inferior myocardial infarction.  Now status post drug-eluting stent placement of the mid right coronary artery.  He presented with distal embolization of his posterolateral branch likely from his mid lesion.  Some improvement in flow by the end of the procedure.    Completed Integrilin infusion.  Remains on aspirin and prasugrel.  2.  Coronary artery disease.  His remaining vessels show mild nonobstructive disease.  3.  Ischemic cardiomyopathy.  EF appeared to be around 35 to 40% by left ventriculogram with inferior wall motion abnormalities.  4.  Hypertension.  Poorly controlled.  5.  Hyperlipidemia.  Untreated.  6.  Diabetes mellitus type 2.  On metformin as an outpatient.    -Losartan dosage already increased and spironolactone added.  - We will increase carvedilol to 25 mg.  - Continue aspirin, prasugrel, and statin therapy.  - Follow-up on echocardiogram results.  - Transfer to telemetry.  - Possible discharge home tomorrow if blood pressures are better controlled.    Donna Mendoza MD  08/07/22  09:46 EDT

## 2022-08-08 ENCOUNTER — TELEPHONE (OUTPATIENT)
Dept: CARDIOLOGY | Facility: CLINIC | Age: 47
End: 2022-08-08

## 2022-08-08 ENCOUNTER — READMISSION MANAGEMENT (OUTPATIENT)
Dept: CALL CENTER | Facility: HOSPITAL | Age: 47
End: 2022-08-08

## 2022-08-08 VITALS
TEMPERATURE: 98 F | HEART RATE: 78 BPM | BODY MASS INDEX: 30.48 KG/M2 | WEIGHT: 230 LBS | DIASTOLIC BLOOD PRESSURE: 75 MMHG | OXYGEN SATURATION: 97 % | HEIGHT: 73 IN | SYSTOLIC BLOOD PRESSURE: 107 MMHG | RESPIRATION RATE: 18 BRPM

## 2022-08-08 LAB
ANION GAP SERPL CALCULATED.3IONS-SCNC: 12.5 MMOL/L (ref 5–15)
BUN SERPL-MCNC: 16 MG/DL (ref 6–20)
BUN/CREAT SERPL: 16.7 (ref 7–25)
CALCIUM SPEC-SCNC: 9.1 MG/DL (ref 8.6–10.5)
CHLORIDE SERPL-SCNC: 100 MMOL/L (ref 98–107)
CO2 SERPL-SCNC: 23.5 MMOL/L (ref 22–29)
CREAT SERPL-MCNC: 0.96 MG/DL (ref 0.76–1.27)
EGFRCR SERPLBLD CKD-EPI 2021: 97.5 ML/MIN/1.73
GLUCOSE BLDC GLUCOMTR-MCNC: 154 MG/DL (ref 70–130)
GLUCOSE BLDC GLUCOMTR-MCNC: 209 MG/DL (ref 70–130)
GLUCOSE SERPL-MCNC: 203 MG/DL (ref 65–99)
POTASSIUM SERPL-SCNC: 3.8 MMOL/L (ref 3.5–5.2)
QT INTERVAL: 422 MS
SODIUM SERPL-SCNC: 136 MMOL/L (ref 136–145)
TROPONIN T SERPL-MCNC: 2.61 NG/ML (ref 0–0.03)

## 2022-08-08 PROCEDURE — 82962 GLUCOSE BLOOD TEST: CPT

## 2022-08-08 PROCEDURE — 84484 ASSAY OF TROPONIN QUANT: CPT | Performed by: INTERNAL MEDICINE

## 2022-08-08 PROCEDURE — 63710000001 INSULIN LISPRO (HUMAN) PER 5 UNITS: Performed by: INTERNAL MEDICINE

## 2022-08-08 PROCEDURE — 93005 ELECTROCARDIOGRAM TRACING: CPT | Performed by: INTERNAL MEDICINE

## 2022-08-08 PROCEDURE — 99239 HOSP IP/OBS DSCHRG MGMT >30: CPT | Performed by: NURSE PRACTITIONER

## 2022-08-08 PROCEDURE — 93010 ELECTROCARDIOGRAM REPORT: CPT | Performed by: INTERNAL MEDICINE

## 2022-08-08 PROCEDURE — 80048 BASIC METABOLIC PNL TOTAL CA: CPT | Performed by: INTERNAL MEDICINE

## 2022-08-08 RX ORDER — ASPIRIN 81 MG/1
81 TABLET, CHEWABLE ORAL DAILY
Qty: 90 TABLET | Refills: 3 | Status: SHIPPED | OUTPATIENT
Start: 2022-08-09

## 2022-08-08 RX ORDER — NITROGLYCERIN 0.4 MG/1
TABLET SUBLINGUAL
Qty: 25 TABLET | Refills: 1 | Status: SHIPPED | OUTPATIENT
Start: 2022-08-08

## 2022-08-08 RX ORDER — CARVEDILOL 25 MG/1
25 TABLET ORAL EVERY 12 HOURS SCHEDULED
Qty: 180 TABLET | Refills: 3 | Status: SHIPPED | OUTPATIENT
Start: 2022-08-08 | End: 2022-08-10

## 2022-08-08 RX ORDER — SPIRONOLACTONE 25 MG/1
25 TABLET ORAL DAILY
Qty: 90 TABLET | Refills: 3 | Status: SHIPPED | OUTPATIENT
Start: 2022-08-09 | End: 2022-09-27

## 2022-08-08 RX ORDER — PRASUGREL 10 MG/1
10 TABLET, FILM COATED ORAL DAILY
Qty: 90 TABLET | Refills: 3 | Status: SHIPPED | OUTPATIENT
Start: 2022-08-09 | End: 2023-03-03 | Stop reason: SDUPTHER

## 2022-08-08 RX ORDER — ATORVASTATIN CALCIUM 40 MG/1
40 TABLET, FILM COATED ORAL NIGHTLY
Qty: 90 TABLET | Refills: 3 | Status: SHIPPED | OUTPATIENT
Start: 2022-08-08

## 2022-08-08 RX ORDER — LOSARTAN POTASSIUM 50 MG/1
50 TABLET ORAL
Qty: 90 TABLET | Refills: 3 | Status: SHIPPED | OUTPATIENT
Start: 2022-08-09 | End: 2022-09-27

## 2022-08-08 RX ADMIN — ASPIRIN 81 MG: 81 TABLET, CHEWABLE ORAL at 08:40

## 2022-08-08 RX ADMIN — PRASUGREL 10 MG: 10 TABLET, FILM COATED ORAL at 08:41

## 2022-08-08 RX ADMIN — ACETAMINOPHEN 650 MG: 325 TABLET, FILM COATED ORAL at 04:59

## 2022-08-08 RX ADMIN — SPIRONOLACTONE 25 MG: 25 TABLET ORAL at 08:39

## 2022-08-08 RX ADMIN — INSULIN LISPRO 4 UNITS: 100 INJECTION, SOLUTION INTRAVENOUS; SUBCUTANEOUS at 12:09

## 2022-08-08 RX ADMIN — LOSARTAN POTASSIUM 50 MG: 50 TABLET, FILM COATED ORAL at 08:39

## 2022-08-08 RX ADMIN — CARVEDILOL 25 MG: 25 TABLET, FILM COATED ORAL at 08:39

## 2022-08-08 RX ADMIN — INSULIN LISPRO 2 UNITS: 100 INJECTION, SOLUTION INTRAVENOUS; SUBCUTANEOUS at 07:38

## 2022-08-08 NOTE — PAYOR COMM NOTE
"Nacho Abernathy (47 y.o. Male)                       ATTENTION;   DISCHARGE CASE REF #      [id number]                                  Date of Birth   1975    Social Security Number       Address   [address] [place], [place] [address]    Home Phone   [phone]    MRN   [id number]       Scientology   Confucianist    Marital Status                               Admission Date   8/6/22    Admission Type   Urgent    Admitting Provider   Donna Mendoza MD    Attending Provider       [address], [address]       Discharge Date   8/8/2022    Discharge Disposition   Home or Self Care    Discharge Destination                               Attending Provider: (none)   Allergies: Lisinopril, Omeprazole    Isolation: None   Infection: None   Code Status: CPR   Advance Care Planning Activity    Ht: 185.4 cm (73\")   Wt: 104 kg (230 lb)    Admission Cmt: None   Principal Problem: None                Active Insurance as of 8/6/2022     Primary Coverage     Payor Plan Insurance Group Employer/Plan Group    [employer] EMPLOYEE [id number]     Payor Plan Address Payor Plan Phone Number Payor Plan Fax Number Effective Dates    [address] [phone]  8/1/2020 - None Entered    [facility] [id number]       Subscriber Name Subscriber Birth Date Member ID       NACHO ABERNATHY 1975 [id number]           Secondary Coverage     Payor Plan Insurance Group Employer/Plan Group    [facility]      Payor Plan Address Payor Plan Phone Number Payor Plan Fax Number Effective Dates    [address] [phone]  6/1/2018 - None Entered    [facility] [id number]       Subscriber Name Subscriber Birth Date Member ID       NACHO ABERNATHY 1975 [id number]                 Emergency Contacts      (Rel.) Home Phone Work Phone Mobile Phone    BeliaCarlene (Spouse) [phone] -- [phone]               Discharge " Summary      Evy Braswell APRN at 22 1238     Attestation signed by Donna Mendoza MD at 22 2114    I have reviewed this documentation and agree.                  Patient Name: Arnav Celaya  :1975  47 y.o.    Date of Admit: 2022  Date of Discharge:  2022    Discharge Diagnosis:  Problems Addressed this Visit          Cardiac and Vasculature    Inferior myocardial infarction (HCC) - Primary    Relevant Medications    carvedilol (COREG) 25 MG tablet    prasugrel (EFFIENT) 10 MG tablet (Start on 2022)    nitroglycerin (NITROSTAT) 0.4 MG SL tablet    Other Relevant Orders    Ambulatory Referral to Cardiac Rehab    Basic Metabolic Panel       Pulmonary and Pneumonias    Mild intermittent asthma without complication    Relevant Orders    Basic Metabolic Panel          Diagnoses         Codes Comments    Inferior myocardial infarction (HCC)    -  Primary ICD-10-CM: I21.19  ICD-9-CM: 410.40     Mild intermittent asthma without complication     ICD-10-CM: J45.20  ICD-9-CM: 493.90         1. Inferior STelevation MI  2. Coronary artery disease: RCA s/p Stent and PTCA of distal embolization of PLB  3. Ischemic Cardiomyopathy improved on echo  4. Hypertension  5. Hyperlipidemia  6. Diabetes Mellitus type 2    Hospital Course:     The patient is a 47 year old male who is new to us with risk factors of hypertension, diabetes mellitus and previous tobacco use. Patient presented to the ER with chest pain and was having st elevation. He had a cardiac cath emergently and found a thrombotic occlusion to the RCA and distal embolization of the large posterior lateral branch. He underwent angioplasty and stenting to the RCA with a 4.0x18mm Xience skypoint MICHELLE.  There was a distal embolization of the posterior lateral branch and he underwent balloon.  Angioplasty there.  LV gram showed EF of 35 to 40% he was put on medical therapy with carvedilol, losartan and Aldactone.  He had an echo that  showed some improvement but wall motion abnormality on his LV.  He has been ambulatory he is little tired but no chest pain tightness or shortness of breath.  We talked about aggressive risk factor modification given his young age.  His right radial cath site is stable I discussed with him the need for dual antiplatelet therapy uninterrupted for a year.  We went over his medications extensively.  He wants to go to [facility] because he lives in [facility].  Procedures Performed  Procedure(s):  Left Heart Cath  Coronary angiography  Left ventriculography  STENT MICHELLE - CORONARY  Optical Coherent Tomography  Percutaneous Manual Thrombectomy  Percutaneous Coronary Intervention       Consults       No orders found from 7/8/2022 to 8/7/2022.            Pertinent Test Results:   Results from last 7 days   Lab Units 08/08/22 0348 08/07/22 0333 08/06/22  1453   SODIUM mmol/L 136   < > 133*   POTASSIUM mmol/L 3.8   < > 3.5   CHLORIDE mmol/L 100   < > 95*   CO2 mmol/L 23.5   < > 19.5*   BUN mg/dL 16   < > 16   CREATININE mg/dL 0.96   < > 1.22   CALCIUM mg/dL 9.1   < > 10.4   BILIRUBIN mg/dL  --   --  0.7   ALK PHOS U/L  --   --  57   ALT (SGPT) U/L  --   --  32   AST (SGOT) U/L  --   --  22   GLUCOSE mg/dL 203*   < > 220*    < > = values in this interval not displayed.     Results from last 7 days   Lab Units 08/08/22 0348 08/07/22  0333 08/06/22  1453   TROPONIN T ng/mL 2.610* 3.780* <0.010     @LABRCNT(bnp)@  Results from last 7 days   Lab Units 08/07/22  0333   WBC 10*3/mm3 14.09*   HEMOGLOBIN g/dL 15.8   HEMATOCRIT % 43.9   PLATELETS 10*3/mm3 322     Results from last 7 days   Lab Units 08/06/22  1453   INR  0.98   APTT seconds 22.2*         Results from last 7 days   Lab Units 08/07/22  0333   CHOLESTEROL mg/dL 167   TRIGLYCERIDES mg/dL 190*   HDL CHOL mg/dL 33*   LDL CHOL mg/dL 101*       Condition on Discharge: stable    Discharge Medications     Discharge Medications        New Medications        Instructions Start  Date   aspirin 81 MG chewable tablet   81 mg, Oral, Daily   Start Date: August 9, 2022     atorvastatin 40 MG tablet  Commonly known as: LIPITOR   40 mg, Oral, Nightly      carvedilol 25 MG tablet  Commonly known as: COREG   25 mg, Oral, Every 12 Hours Scheduled      losartan 50 MG tablet  Commonly known as: COZAAR   50 mg, Oral, Every 24 Hours Scheduled   Start Date: August 9, 2022     nitroglycerin 0.4 MG SL tablet  Commonly known as: NITROSTAT   1 under the tongue as needed for angina, may repeat q5mins for up three doses      prasugrel 10 MG tablet  Commonly known as: EFFIENT   10 mg, Oral, Daily   Start Date: August 9, 2022     spironolactone 25 MG tablet  Commonly known as: ALDACTONE   25 mg, Oral, Daily   Start Date: August 9, 2022            Continue These Medications        Instructions Start Date   cetirizine 10 MG tablet  Commonly known as: zyrTEC  Notes to patient: Resume home schedule   10 mg, Oral, Daily      fluticasone 50 MCG/ACT nasal spray  Commonly known as: Flonase  Notes to patient: Resume home schedule   2 sprays, Nasal, Daily      glucose blood test strip   Use as instructed daily      glucose monitor monitoring kit   1 each, Does not apply, As Needed      Lancet Devices misc   1 each, Does not apply, Daily PRN      metFORMIN  MG 24 hr tablet  Commonly known as: Glucophage XR   1,000 mg, Oral, Daily With Breakfast             Stop These Medications      amLODIPine 5 MG tablet  Commonly known as: NORVASC              Discharge Diet:     Activity at Discharge:     Discharge disposition: home    Follow-up Appointments  Future Appointments   Date Time Provider Department Center   8/10/2022  1:00 PM Lynn Weber MD [facility]   8/15/2022  9:00 AM Glenis Bradley APRN [facility]     Additional Instructions for the Follow-ups that You Need to Schedule       Ambulatory Referral to Cardiac Rehab   As directed      Basic Metabolic Panel    Aug 15, 2022 (Approximate)       Release to patient: Immediate                 Test Results Pending at Discharge       EUGENIO Chiu, [facility]  08/08/22  12:38 EDT    Time: Discharge 35 min  Electronically signed by EUGENIO Chiu, 08/08/22, 12:38 PM EDT.      Electronically signed by Donna Mendoza MD at 08/08/22 9041

## 2022-08-08 NOTE — PROGRESS NOTES
[facility] Clinical Pharmacy Services: Pharmacy Education - Post PCI Discharge Medication    Arnav Celaya was counseled over post-PCI medications prior to discharge. Counseling points included the followin) Prasugrel's indication, patient's need for the medication, and dosing/frequency  2) Enforced the importance of taking prasugrel/aspirin as instructed every day  3) Explained possible side effects of prasugrel/aspirin therapy, including increased risk of bleeding, s/sx of bleeding, and increase in cold intolerance. Also talked about ways to control bleeding for minor cuts and scrapes.  4) Discussed all important drug interactions, including over-the-counter medications and supplements.     Patient also started on high dose atorvastatin. Talked about patient's need for medications in light of stent placement, dosing/frequency, and importance of taking medication at night. Talked about interactions, including interaction with grapefruit juice, and using alcohol in moderation.     Explained risk for thrombosis on new stent (especially in the first 3-6 months) and medication compliance. Patient also discharged on nitroglycerin sublingual tablets.    Patient expressed understanding and had no further questions.      Katelin Sotomayor, Pharm.D., [facility]   Clinical Staff Pharmacist   Phone Extension #[phone]

## 2022-08-08 NOTE — DISCHARGE SUMMARY
Patient Name: Arnav Celaya  :1975  47 y.o.    Date of Admit: 2022  Date of Discharge:  2022    Discharge Diagnosis:  Problems Addressed this Visit          Cardiac and Vasculature    Inferior myocardial infarction (HCC) - Primary    Relevant Medications    carvedilol (COREG) 25 MG tablet    prasugrel (EFFIENT) 10 MG tablet (Start on 2022)    nitroglycerin (NITROSTAT) 0.4 MG SL tablet    Other Relevant Orders    Ambulatory Referral to Cardiac Rehab    Basic Metabolic Panel       Pulmonary and Pneumonias    Mild intermittent asthma without complication    Relevant Orders    Basic Metabolic Panel          Diagnoses         Codes Comments    Inferior myocardial infarction (HCC)    -  Primary ICD-10-CM: I21.19  ICD-9-CM: 410.40     Mild intermittent asthma without complication     ICD-10-CM: J45.20  ICD-9-CM: 493.90         1. Inferior STelevation MI  2. Coronary artery disease: RCA s/p Stent and PTCA of distal embolization of PLB  3. Ischemic Cardiomyopathy improved on echo  4. Hypertension  5. Hyperlipidemia  6. Diabetes Mellitus type 2    Hospital Course:     The patient is a 47 year old male who is new to us with risk factors of hypertension, diabetes mellitus and previous tobacco use. Patient presented to the ER with chest pain and was having st elevation. He had a cardiac cath emergently and found a thrombotic occlusion to the RCA and distal embolization of the large posterior lateral branch. He underwent angioplasty and stenting to the RCA with a 4.0x18mm Xience skypoint MICHELLE.  There was a distal embolization of the posterior lateral branch and he underwent balloon.  Angioplasty there.  LV gram showed EF of 35 to 40% he was put on medical therapy with carvedilol, losartan and Aldactone.  He had an echo that showed some improvement but wall motion abnormality on his LV.  He has been ambulatory he is little tired but no chest pain tightness or shortness of breath.  We talked about aggressive  risk factor modification given his young age.  His right radial cath site is stable I discussed with him the need for dual antiplatelet therapy uninterrupted for a year.  We went over his medications extensively.  He wants to go to [place] because he lives in [facility].  Procedures Performed  Procedure(s):  Left Heart Cath  Coronary angiography  Left ventriculography  STENT MICHELLE - CORONARY  Optical Coherent Tomography  Percutaneous Manual Thrombectomy  Percutaneous Coronary Intervention       Consults       No orders found from 7/8/2022 to 8/7/2022.            Pertinent Test Results:   Results from last 7 days   Lab Units 08/08/22 0348 08/07/22  0333 08/06/22  1453   SODIUM mmol/L 136   < > 133*   POTASSIUM mmol/L 3.8   < > 3.5   CHLORIDE mmol/L 100   < > 95*   CO2 mmol/L 23.5   < > 19.5*   BUN mg/dL 16   < > 16   CREATININE mg/dL 0.96   < > 1.22   CALCIUM mg/dL 9.1   < > 10.4   BILIRUBIN mg/dL  --   --  0.7   ALK PHOS U/L  --   --  57   ALT (SGPT) U/L  --   --  32   AST (SGOT) U/L  --   --  22   GLUCOSE mg/dL 203*   < > 220*    < > = values in this interval not displayed.     Results from last 7 days   Lab Units 08/08/22 0348 08/07/22 0333 08/06/22  1453   TROPONIN T ng/mL 2.610* 3.780* <0.010     @LABRCNT(bnp)@  Results from last 7 days   Lab Units 08/07/22  0333   WBC 10*3/mm3 14.09*   HEMOGLOBIN g/dL 15.8   HEMATOCRIT % 43.9   PLATELETS 10*3/mm3 322     Results from last 7 days   Lab Units 08/06/22  1453   INR  0.98   APTT seconds 22.2*         Results from last 7 days   Lab Units 08/07/22  0333   CHOLESTEROL mg/dL 167   TRIGLYCERIDES mg/dL 190*   HDL CHOL mg/dL 33*   LDL CHOL mg/dL 101*       Condition on Discharge: stable    Discharge Medications     Discharge Medications        New Medications        Instructions Start Date   aspirin 81 MG chewable tablet   81 mg, Oral, Daily   Start Date: August 9, 2022     atorvastatin 40 MG tablet  Commonly known as: LIPITOR   40 mg, Oral, Nightly      carvedilol 25  MG tablet  Commonly known as: COREG   25 mg, Oral, Every 12 Hours Scheduled      losartan 50 MG tablet  Commonly known as: COZAAR   50 mg, Oral, Every 24 Hours Scheduled   Start Date: August 9, 2022     nitroglycerin 0.4 MG SL tablet  Commonly known as: NITROSTAT   1 under the tongue as needed for angina, may repeat q5mins for up three doses      prasugrel 10 MG tablet  Commonly known as: EFFIENT   10 mg, Oral, Daily   Start Date: August 9, 2022     spironolactone 25 MG tablet  Commonly known as: ALDACTONE   25 mg, Oral, Daily   Start Date: August 9, 2022            Continue These Medications        Instructions Start Date   cetirizine 10 MG tablet  Commonly known as: zyrTEC  Notes to patient: Resume home schedule   10 mg, Oral, Daily      fluticasone 50 MCG/ACT nasal spray  Commonly known as: Flonase  Notes to patient: Resume home schedule   2 sprays, Nasal, Daily      glucose blood test strip   Use as instructed daily      glucose monitor monitoring kit   1 each, Does not apply, As Needed      Lancet Devices misc   1 each, Does not apply, Daily PRN      metFORMIN  MG 24 hr tablet  Commonly known as: Glucophage XR   1,000 mg, Oral, Daily With Breakfast             Stop These Medications      amLODIPine 5 MG tablet  Commonly known as: NORVASC              Discharge Diet:     Activity at Discharge:     Discharge disposition: home    Follow-up Appointments  Future Appointments   Date Time Provider Department Center   8/10/2022  1:00 PM Lynn Weber MD [facility]   8/15/2022  9:00 AM Glenis Bradley APRN [facility]     Additional Instructions for the Follow-ups that You Need to Schedule       Ambulatory Referral to Cardiac Rehab   As directed      Basic Metabolic Panel    Aug 15, 2022 (Approximate)      Release to patient: Immediate                 Test Results Pending at Discharge       EUGENIO Chiu, [facility]  08/08/22  12:38 EDT    Time: Discharge 35  min  Electronically signed by Evy Braswell, EUGENIO, 08/08/22, 12:38 PM EDT.

## 2022-08-08 NOTE — TELEPHONE ENCOUNTER
Patient still admitted, will call once discharged.     Aubrie Nixon RN  Triage [facility]

## 2022-08-08 NOTE — TELEPHONE ENCOUNTER
----- Message from EUGENIO Chiu sent at 8/8/2022 11:13 AM EDT -----  Patient to see Chris Gonzales in 1 week

## 2022-08-08 NOTE — CONSULTS
Spoke with patient and his wife, discussed benefits of cardiac rehab. Provided phase II information along with the contact information for cardiac rehab at [facility]. Requested for referral to be sent.

## 2022-08-08 NOTE — PAYOR COMM NOTE
"Belia Nacho CORRY (47 y.o. Male)                                ATTENTION;    INITIAL CLINICALS FOR REVIEW MEMBER ID #          [id number]                           REPLY TO [facility] DEPT  [phone] OR CALL  SILVIA CABRERA CELY              Date of Birth   1975    Social Security Number       Address   [address] [place] [id number]    Home Phone   [phone]    MRN   [id number]       Roman Catholic   Adventist    Marital Status                               Admission Date   8/6/22    Admission Type   Urgent    Admitting Provider   Donna Mendoza MD    Attending Provider   Donna Mendoza MD    Department, Room/Bed   [address], 2213/1       Discharge Date       Discharge Disposition       Discharge Destination                               Attending Provider: Donna Mendoza MD    Allergies: Lisinopril, Omeprazole    Isolation: None   Infection: None   Code Status: CPR   Advance Care Planning Activity    Ht: 185.4 cm (73\")   Wt: 104 kg (230 lb)    Admission Cmt: None   Principal Problem: None                Active Insurance as of 8/6/2022     Primary Coverage     Payor Plan Insurance Group Employer/Plan Group    [employer] [facility] [facility] EMPLOYEE [id number]     Payor Plan Address Payor Plan Phone Number Payor Plan Fax Number Effective Dates    [id number] [phone]  8/1/2020 - None Entered    [facility] [id number]       Subscriber Name Subscriber Birth Date Member ID       NACHO ABERNATHY 1975 [id number]           Secondary Coverage     Payor Plan Insurance Group Employer/Plan Group    [facility]      Payor Plan Address Payor Plan Phone Number Payor Plan Fax Number Effective Dates    [address] [phone]  6/1/2018 - None Entered    [facility] [id number]       Subscriber Name Subscriber Birth Date Member ID       NACHO ABERNATHY 1975 [id number]                 Emergency Contacts      " (Rel.) Home Phone Work Phone Mobile Phone    Carlene Celaya (Spouse) [phone] -- [phone]               History & Physical      Donna Mendoza MD at [address] [place] [address] Cardiology History And Physical Assessment    Patient Name: Arnav Celaya  Age/Sex: 47 y.o. male  : 1975  MRN: [id number]    Date of Hospital Admission: 2022  Date of Encounter Visit: 22  Encounter Provider: Donna Mendoza MD  Place of Service: [facility] CARDIOLOGY  Patient Care Team:  Lynn Weber MD as PCP - General (Internal Medicine & Pediatrics)    Subjective:     Chief Complaint: Inferior myocardial infarction    History of Present Illness:  Arnav Celaya is a 47 y.o. male hypertension, per lipidemia, diabetes mellitus type 2, prior tobacco use, who presents with an inferior myocardial infarction.    The patient reports that he was out playing football today when he had a sudden onset chest and back pain.  This was associated with some increase shortness of breath.  He ended up driving himself to the [facility] emergency room where an EKG was performed showing evidence of inferior ST elevations concerning for an inferior myocardial infarction.  He was treated with heparin infusion and given a loading dose of clopidogrel and transferred emergently to [facility].    Following his arrival to the hospital he was brought directly to the cardiac catheterization laboratory where coronary angiograms revealed a 95% thrombotic stenosis of his mid right coronary artery along with distal embolization of a large posterolateral branch.  He underwent drug-eluting stent placement of the mid right coronary artery with a Xience Skypoint 4.0 x 18 mm stent (postdilated with a 5.0 mm balloon).  The posterolateral distal embolization was treated with balloon angioplasty and thrombectomy initially without much improvement in flow.  Patient was started on  Integrilin infusion and given several rounds of intracoronary nitroglycerin, nitroprusside, and adenosine.  The final images of the right coronary artery there appear to be some improvement in flow in the posterior lateral branch.  He continues to have inferior ST elevations but improvement in his symptoms.  He was otherwise hemodynamically stable throughout the procedure.  Left ventriculogram does show inferior wall motion abnormalities with mild to moderately depressed left ventricular systolic function.    Patient denies any prior cardiac history or family history of cardiac disease.  He does reports that he previously smoked for quite a few years ago.  Denies any symptoms prior to today.    Past Medical History:  Past Medical History:   Diagnosis Date   • Hemochromatosis        No past surgical history on file.    Home Medications:   Medications Prior to Admission   Medication Sig Dispense Refill Last Dose   • amLODIPine (NORVASC) 5 MG tablet Take 1 tablet by mouth Daily. Schedule labs and appointment. 90 tablet 1    • cetirizine (zyrTEC) 10 MG tablet Take 1 tablet by mouth Daily. 90 tablet 3    • fluticasone (Flonase) 50 MCG/ACT nasal spray 2 sprays into the nostril(s) as directed by provider Daily. 48 g 3    • glucose blood test strip Use as instructed daily 100 each 1    • glucose monitor monitoring kit 1 each As Needed (daily to check glucose). 1 each 0    • Lancet Devices misc 1 each Daily As Needed (to check glucose). 100 each 1    • metFORMIN ER (Glucophage XR) 500 MG 24 hr tablet Take 2 tablets by mouth Daily With Breakfast. 180 tablet 1        Allergies:  Allergies   Allergen Reactions   • Lisinopril Cough   • Omeprazole Other (See Comments)     Poss rash       Past Social History:  Social History     Socioeconomic History   • Marital status:    Tobacco Use   • Smoking status: Former Smoker     Packs/day: 0.25     Years: 10.00     Pack years: 2.50     Types: Cigarettes     Quit date: 1/1/2014      Years since quittin.6   • Smokeless tobacco: Never Used   • Tobacco comment: Was on and off smoking until around 2019   Substance and Sexual Activity   • Alcohol use: Yes     Alcohol/week: 1.0 standard drink     Types: 1 Cans of beer per week     Comment: socially   • Drug use: No   • Sexual activity: Defer       Past Family History:  No family history on file.    Review of Systems:   All systems reviewed. Pertinent positives identified in HPI. All other systems are negative.    Objective:   Temp:  [98.9 °F (37.2 °C)] 98.9 °F (37.2 °C)  Heart Rate:  [85] 85  Resp:  [13-25] 24  BP: (140)/(102) 140/102  No intake or output data in the 24 hours ending 22 1720  There is no height or weight on file to calculate BMI.  There were no vitals filed for this visit.  Weight change:     Physical Exam:   General Appearance:    Alert, cooperative, in no acute distress   Head:    Normocephalic, without obvious abnormality, atraumatic   Eyes:            Lids and lashes normal, conjunctivae and sclerae normal, no   icterus, no pallor, corneas clear, PERRLA   Ears:    Ears appear intact with no abnormalities noted   Neck:   No adenopathy, supple, trachea midline, no thyromegaly, no   carotid bruit, no JVD   Lungs:     Clear to auscultation,respirations regular, even and unlabored    Heart:    Regular rhythm and normal rate, normal S1 and S2, no murmur, no gallop, no rub, no click   Chest Wall:    No abnormalities observed   Abdomen:     Normal bowel sounds, no masses, no organomegaly, soft        non-tender, non-distended, no guarding, no rebound  tenderness   Extremities:   Moves all extremities well, no edema, no cyanosis, no redness   Pulses:   Pulses palpable and equal bilaterally. Normal radial, carotid, femoral, dorsalis pedis and posterior tibial pulses bilaterally. Normal abdominal aorta   Skin:  Psychiatric:   No bleeding, bruising or rash    Alert and oriented x 3, normal mood and affect         Lab Review:    Results from last 7 days   Lab Units 08/06/22  1453   SODIUM mmol/L 133*   POTASSIUM mmol/L 3.5   CHLORIDE mmol/L 95*   CO2 mmol/L 19.5*   BUN mg/dL 16   CREATININE mg/dL 1.22   GLUCOSE mg/dL 220*   CALCIUM mg/dL 10.4   AST (SGOT) U/L 22   ALT (SGPT) U/L 32     Results from last 7 days   Lab Units 08/06/22  1453   TROPONIN T ng/mL <0.010     Results from last 7 days   Lab Units 08/06/22  1453   WBC 10*3/mm3 10.29   HEMOGLOBIN g/dL 17.4   HEMATOCRIT % 48.9   PLATELETS 10*3/mm3 389     Results from last 7 days   Lab Units 08/06/22  1453   INR  0.98   APTT seconds 22.2*     I personally viewed and interpreted the patient's EKG    Assessment/Plan:     1.  Inferior myocardial infarction.  Now status post drug-eluting stent placement of the mid right coronary artery.  He presented with distal embolization of his posterolateral branch likely from his mid lesion.  Some improvement in flow by the end of the procedure.  Suspect this will continue to improve with time and medical treatment including with Integrilin infusion.  2.  Coronary artery disease.  His remaining vessels show mild nonobstructive disease.  3.  Ischemic cardiomyopathy.  EF appeared to be around 35 to 40% by left ventriculogram with inferior wall motion abnormalities.  4.  Hypertension.  Poorly controlled.  5.  Hyperlipidemia.  Untreated.  6.  Diabetes mellitus type 2.  On metformin as an outpatient.    - Continue Integrilin infusion for 12 hours total.  - Continue dual antiplatelet therapy.  We will transition from clopidogrel to prasugrel tomorrow.  - Start carvedilol and losartan for hypertension and cardiomyopathy.  - Start statin therapy.  - Start insulin sliding scale and hold metformin.  - Check echocardiogram.    Donna Mendoza MD  08/06/22  17:20 EDT                      Electronically signed by Donna Mendoza MD at 08/06/22 8330              Vital Signs (last 3 days)     Date/Time Temp Temp src Pulse Resp BP Patient Position SpO2    08/08/22  0745 98.6 (37) Oral 83 16 107/77 Sitting 95    08/08/22 0620 98.8 (37.1) Oral 82 16 114/81 Sitting 96    08/08/22 0335 98.7 (37.1) Oral 82 16 105/75 Lying --    08/07/22 2318 98.4 (36.9) Oral 88 20 93/66 Sitting 95    08/07/22 2046 99 (37.2) Oral 84 20 135/95 Sitting 95    08/07/22 1723 -- -- -- -- 110/77 -- --    08/07/22 1100 -- -- 77 -- 123/90 -- --    08/07/22 1000 -- -- 83 -- 145/103 -- 94    08/07/22 0900 -- -- 87 -- 151/102 -- 94    08/07/22 0800 -- -- 91 -- 149/107 -- 96    08/07/22 0700 -- -- 83 -- 157/109 -- 95 08/07/22 0630 -- -- 82 -- 146/109 -- 95 08/07/22 0615 -- -- 78 -- 149/109 -- 95    08/07/22 0545 -- -- 81 -- 164/117 -- 95    08/07/22 0530 -- -- 74 -- 152/114 -- 95 08/07/22 0515 -- -- 79 -- 162/117 -- 95    08/07/22 0500 -- -- 79 -- 159/111 -- 95    08/07/22 0445 -- -- 72 -- 159/113 -- 96    08/07/22 0430 -- -- 83 -- 153/113 -- 95    08/07/22 0415 -- -- 80 -- 154/114 -- 95    08/07/22 0400 98 (36.7) Oral 86 -- 162/114 -- 94    08/07/22 0345 -- -- 81 -- 157/116 -- 95    08/07/22 0330 -- -- 83 -- 158/112 -- 95    08/07/22 0300 -- -- 70 -- 150/108 -- 96    08/07/22 0245 -- -- 81 -- 145/111 -- 96    08/07/22 0230 -- -- 82 -- 142/110 -- 95    08/07/22 0215 -- -- 67 -- 151/106 -- 95    08/07/22 0200 -- -- 80 -- 155/111 -- 95    08/07/22 0145 -- -- 85 -- 160/111 -- 94    08/07/22 0130 -- -- 73 -- 147/112 -- 95    08/07/22 0115 -- -- 70 -- 156/111 -- 97    08/07/22 0100 -- -- 90 -- 154/114 -- 97    08/07/22 0045 -- -- 92 -- 150/99 -- 95    08/07/22 0030 -- -- 85 -- 150/118 -- 95    08/07/22 0000 98.8 (37.1) Oral 86 -- 157/113 -- 96    08/06/22 2335 -- -- 96 -- 157/110 -- 95    08/06/22 2330 -- -- 69 -- 142/111 -- 96    08/06/22 2315 -- -- 88 -- 132/115 -- 95    08/06/22 2300 -- -- 71 -- 142/104 -- 94    08/06/22 2245 -- -- 96 -- 132/105 -- 94    08/06/22 2230 -- -- 80 -- 132/104 -- 94    08/06/22 2215 -- -- 90 -- 137/105 -- 94    08/06/22 2200 -- -- 92 -- 136/98 -- 96    08/06/22 2145 -- -- 95 --  146/111 -- 97    08/06/22 2130 -- -- 101 -- 147/107 -- 95    08/06/22 2115 -- -- 96 -- 155/106 -- 97    08/06/22 2105 -- -- 94 -- 154/111 -- 96    08/06/22 2100 -- -- 98 -- 154/115 -- 96    08/06/22 2015 -- -- 92 -- 147/109 -- 96    08/06/22 2000 97.8 (36.6) Oral 92 -- 156/113 -- 96    08/06/22 1930 -- -- 91 -- 160/111 -- 98    08/06/22 1845 -- -- 84 -- 130/100 -- 98    08/06/22 1830 -- -- 92 -- 142/102 -- 98    08/06/22 1815 -- -- 86 -- 153/117 -- 98    08/06/22 1800 -- -- 76 -- 147/101 -- 98    08/06/22 1745 -- -- 66 -- 138/104 -- 98    08/06/22 1730 -- -- 93 -- 136/97 -- 99    08/06/22 16:46:22 -- -- -- 24 -- -- --    08/06/22 16:42:02 -- -- -- 19 -- -- --    08/06/22 1637 -- -- -- 20 -- -- --    08/06/22 16:32:10 -- -- -- 21 -- -- --    08/06/22 16:27:08 -- -- -- 21 -- -- --    08/06/22 16:22:19 -- -- -- 24 -- -- --    08/06/22 16:17:40 -- -- -- 24 -- -- --    08/06/22 16:13:24 -- -- -- 24 -- -- --    08/06/22 16:08:35 -- -- -- 24 -- -- --    08/06/22 16:04:07 -- -- -- 20 -- -- --    08/06/22 15:59:42 -- -- -- 21 -- -- --    08/06/22 1555 -- -- -- 20 -- -- --    08/06/22 1550 -- -- -- 25 -- -- --    08/06/22 1547 -- -- -- 23 -- -- --    08/06/22 15:42:24 -- -- -- 13 -- -- --    08/06/22 15:37:51 -- -- -- 20 -- -- --    08/06/22 15:36:44 -- -- -- -- -- -- 93          Oxygen Therapy (last 3 days)     Date/Time SpO2 Device (Oxygen Therapy) Flow (L/min) Oxygen Concentration (%) ETCO2 (mmHg)    08/08/22 0900 -- room air -- -- --    08/08/22 0745 95 -- -- -- --    08/08/22 0620 96 -- -- -- --    08/07/22 2318 95 room air -- -- --    08/07/22 2123 -- room air -- -- --    08/07/22 2046 95 room air -- -- --    08/07/22 1000 94 -- -- -- --    08/07/22 0900 94 -- -- -- --    08/07/22 0800 96 -- -- -- --    08/07/22 0700 95 -- -- -- --    08/07/22 0630 95 -- -- -- --    08/07/22 0615 95 -- -- -- --    08/07/22 0545 95 -- -- -- --    08/07/22 0530 95 -- -- -- --    08/07/22 0515 95 -- -- -- --    08/07/22 0500 95 -- -- -- --     08/07/22 0445 96 -- -- -- --    08/07/22 0430 95 -- -- -- --    08/07/22 0415 95 -- -- -- --    08/07/22 0400 94 room air -- -- --    08/07/22 0345 95 -- -- -- --    08/07/22 0330 95 -- -- -- --    08/07/22 0300 96 -- -- -- --    08/07/22 0245 96 -- -- -- --    08/07/22 0230 95 -- -- -- --    08/07/22 0215 95 -- -- -- --    08/07/22 0200 95 -- -- -- --    08/07/22 0145 94 -- -- -- --    08/07/22 0130 95 -- -- -- --    08/07/22 0115 97 -- -- -- --    08/07/22 0100 97 -- -- -- --    08/07/22 0045 95 -- -- -- --    08/07/22 0030 95 -- -- -- --    08/07/22 0000 96 room air -- -- --    08/06/22 2335 95 room air -- -- --    08/06/22 2330 96 -- -- -- --    08/06/22 2315 95 -- -- -- --    08/06/22 2300 94 -- -- -- --    08/06/22 2245 94 -- -- -- --    08/06/22 2230 94 -- -- -- --    08/06/22 2215 94 -- -- -- --    08/06/22 2200 96 -- -- -- --    08/06/22 2145 97 -- -- -- --    08/06/22 2130 95 -- -- -- --    08/06/22 2115 97 -- -- -- --    08/06/22 2105 96 -- -- -- --    08/06/22 2100 96 -- -- -- --    08/06/22 2030 -- room air -- -- --    08/06/22 2015 96 -- -- -- --    08/06/22 2000 96 -- -- -- --    08/06/22 1930 98 -- -- -- --    08/06/22 1845 98 -- -- -- --    08/06/22 1830 98 -- -- -- --    08/06/22 1815 98 -- -- -- --    08/06/22 1800 98 -- -- -- --    08/06/22 1745 98 -- -- -- --    08/06/22 1730 99 -- -- -- --    08/06/22 15:36:44 93 nasal cannula with ETCO2 3 -- --          Facility-Administered Medications as of 8/8/2022   Medication Dose Route Frequency Provider Last Rate Last Admin   • acetaminophen (TYLENOL) tablet 650 mg  650 mg Oral Q4H PRN Donna Mendoza MD   650 mg at 08/08/22 0459   • [COMPLETED] aspirin chewable tablet 324 mg  324 mg Oral Once Teofilo Bowles MD   324 mg at 08/06/22 1448   • aspirin chewable tablet 81 mg  81 mg Oral Daily Donna Mendoza MD   81 mg at 08/08/22 0840   • atorvastatin (LIPITOR) tablet 40 mg  40 mg Oral Nightly Donna Mendoza MD   40 mg at 08/07/22 6034   •  carvedilol (COREG) tablet 25 mg  25 mg Oral Q12H Donna Mendoza MD   25 mg at 22 0839   • [COMPLETED] carvedilol (COREG) tablet 3.125 mg  3.125 mg Oral Once Donna Mendoza MD   3.125 mg at 22 0000   • [COMPLETED] clopidogrel (PLAVIX) tablet 600 mg  600 mg Oral Once Teofilo Bowles MD   600 mg at 22 1459   • dextrose (D50W) (25 g/50 mL) IV injection 25 g  25 g Intravenous Q15 Min PRN Donna Mendoza MD       • dextrose (GLUTOSE) oral gel 15 g  15 g Oral Q15 Min PRN Donna Mendoza MD       • [COMPLETED] eptifibatide (INTEGRILIN) 75 mg in 100 mL solution    Continuous PRN Donna Mendoza MD 16.64 mL/hr at 22 1606 2 mcg/kg/min at 22 1606   • [] eptifibatide (INTEGRILIN) 75 mg in 100 mL solution  2 mcg/kg/min Intravenous Continuous Donna Mendoza MD   Stopped at 22 0758   • glucagon (human recombinant) (GLUCAGEN DIAGNOSTIC) injection 1 mg  1 mg Intramuscular Q15 Min PRN Donna Mendoza MD       • [COMPLETED] heparin (porcine) 35222 UNIT/ML injection 4,000 Units  4,000 Units Intravenous Once Teofilo Bowles MD   4,000 Units at 22 1459   • HYDROcodone-acetaminophen (NORCO) 5-325 MG per tablet 1 tablet  1 tablet Oral Q4H PRN Donna Mendoza MD   1 tablet at 22 2154   • insulin lispro (ADMELOG) injection 0-9 Units  0-9 Units Subcutaneous TID AC Donna Mendoza MD   2 Units at 22 0738   • [COMPLETED] losartan (COZAAR) tablet 50 mg  50 mg Oral Once Josiane Yousif MD   50 mg at 22 0643   • losartan (COZAAR) tablet 50 mg  50 mg Oral Q24H Donna Mendoza MD   50 mg at 22 0839   • morphine injection 1 mg  1 mg Intravenous Q4H PRN Donna Mendoza MD   1 mg at 22 2156    And   • naloxone (NARCAN) injection 0.4 mg  0.4 mg Intravenous Q5 Min PRN Donna Mendoza MD       • prasugrel (EFFIENT) tablet 10 mg  10 mg Oral Daily Donna Mendoza MD   10 mg at 22 0841   • [COMPLETED] sodium chloride 0.9 % infusion    Continuous  PRN Donna Mendoza  mL/hr at 22 1537 100 mL/hr at 22 1537   • [] sodium chloride 0.9 % infusion  100 mL/hr Intravenous Continuous Donna Mendoza  mL/hr at 22 1823 100 mL/hr at 22 1823   • spironolactone (ALDACTONE) tablet 25 mg  25 mg Oral Daily Josiane Yousif MD   25 mg at 22 0839     Orders (last 72 hrs)      Start     Ordered    22 0000  aspirin 81 MG chewable tablet  Daily         22 1113    22 0000  losartan (COZAAR) 50 MG tablet  Every 24 Hours Scheduled         22 1113    22 0000  prasugrel (EFFIENT) 10 MG tablet  Daily         22 1113    22 0000  spironolactone (ALDACTONE) 25 MG tablet  Daily         22 1113    22 1107  Discharge patient  Once         22 1113    22 0900  losartan (COZAAR) tablet 100 mg  Every 24 Hours Scheduled,   Status:  Discontinued         22 0449    22 0900  losartan (COZAAR) tablet 50 mg  Every 24 Hours Scheduled         22 1759    22 0631  POC Glucose Once  PROCEDURE ONCE         22 0628    22 0600  Basic Metabolic Panel  Morning Draw         22 1428    22 0600  Troponin  Morning Draw         22 1428    22 0500  ECG 12 Lead  Tomorrow AM         22 1428    22 0000  atorvastatin (LIPITOR) 40 MG tablet  Nightly         22 1113    22 0000  carvedilol (COREG) 25 MG tablet  Every 12 Hours Scheduled         22 1113    22 0000  nitroglycerin (NITROSTAT) 0.4 MG SL tablet         22 1113    22 2100  Patient May Shower  Once         22 2101    22 1736  POC Glucose Once  PROCEDURE ONCE         22 1733    22 1429  Transfer Patient  Once         22 1430    22 1341  POC Activated Clotting Time  PROCEDURE ONCE         22 1646    22 1306  POC Glucose Once  PROCEDURE ONCE         22 1301    22 1117  COVID PRE-OP /  PRE-PROCEDURE SCREENING ORDER (NO ISOLATION) - Swab, Nasopharynx  Once         08/07/22 1117    08/07/22 1117  COVID-19,[facility] IN-HOUSE CEPHEID/AUBRIE NP SWAB IN TRANSPORT MEDIA 8-12 HR TAT - Swab, Nasopharynx  PROCEDURE ONCE         08/07/22 1117    08/07/22 1000  carvedilol (COREG) tablet 25 mg  Every 12 Hours Scheduled         08/07/22 0826    08/07/22 0900  prasugrel (EFFIENT) tablet 10 mg  Daily         08/06/22 1719 08/07/22 0900  aspirin chewable tablet 81 mg  Daily         08/06/22 1719 08/07/22 0900  carvedilol (COREG) tablet 6.25 mg  Every 12 Hours Scheduled,   Status:  Discontinued         08/06/22 2302 08/07/22 0700  ECG 12 Lead  Once         08/06/22 1719 08/07/22 0700  POC Glucose TID AC  3 Times Daily Before Meals       08/06/22 1719 08/07/22 0600  CBC (No Diff)  Morning Draw         08/06/22 1719 08/07/22 0600  Basic Metabolic Panel  Morning Draw         08/06/22 1719 08/07/22 0600  Hemoglobin A1c  Morning Draw         08/06/22 1719 08/07/22 0600  Lipid Panel  Morning Draw        Comments: Fasting      08/06/22 1719 08/07/22 0600  Troponin  Morning Draw         08/06/22 1751    08/07/22 0545  losartan (COZAAR) tablet 50 mg  Once         08/07/22 0449    08/07/22 0545  spironolactone (ALDACTONE) tablet 25 mg  Daily         08/07/22 0449 08/07/22 0000  carvedilol (COREG) tablet 3.125 mg  Once         08/06/22 2302 08/06/22 2303  VTE Prophylaxis Not Indicated: Acute MI or Stroke (1); </= 3 (Low Risk)  Once         08/06/22 2302 08/06/22 2100  atorvastatin (LIPITOR) tablet 40 mg  Nightly         08/06/22 1719 08/06/22 2100  carvedilol (COREG) tablet 3.125 mg  Every 12 Hours Scheduled,   Status:  Discontinued         08/06/22 1719 08/06/22 2000  Strict intake and output  Every 4 Hours       08/06/22 1719 08/06/22 1915  eptifibatide (INTEGRILIN) 75 mg in 100 mL solution  Continuous         08/06/22 1826    08/06/22 1834  POC Glucose Once  PROCEDURE ONCE          08/06/22 1832    08/06/22 1827  Initiate eptifibatide (INTEGRILIN) Protocol  Until Discontinued         08/06/22 1826    08/06/22 1827  Monitor for Signs / Symptoms of Bleeding (Especially at Arterial Access Site)  Continuous         08/06/22 1826    08/06/22 1827  Do Not Discontinue Infusion Without Physician Order Unless Uncontrolled Bleeding Occurs  Continuous         08/06/22 1826    08/06/22 1827  Notify Provider - Abnormal Labs While On Integrilin  Until Discontinued         08/06/22 1826    08/06/22 1827  RN to Release CBC Order 6 Hours After Start of Integrilin Infusin  Once         08/06/22 1826    08/06/22 1800  Incentive Spirometry  Every 2 Hours While Awake       08/06/22 1719    08/06/22 1746  POC Activated Clotting Time  PROCEDURE ONCE         08/07/22 1340    08/06/22 1730  insulin lispro (ADMELOG) injection 0-9 Units  3 Times Daily Before Meals         08/06/22 1719    08/06/22 1721  sodium chloride 0.9 % infusion  Continuous         08/06/22 1719    08/06/22 1721  losartan (COZAAR) tablet 25 mg  Every 24 Hours Scheduled,   Status:  Discontinued         08/06/22 1719    08/06/22 1720  Adult Transthoracic Echo Complete W/ Cont if Necessary Per Protocol  Once         08/06/22 1719    08/06/22 1719  Do NOT Hold Basal or Correction Scale Insulin When Patient is NPO, Hold Scheduled Mealtime (Bolus) Insulin if NPO  Continuous         08/06/22 1719    08/06/22 1718  dextrose (GLUTOSE) oral gel 15 g  Every 15 Minutes PRN         08/06/22 1719    08/06/22 1718  dextrose (D50W) (25 g/50 mL) IV injection 25 g  Every 15 Minutes PRN         08/06/22 1719    08/06/22 1718  glucagon (human recombinant) (GLUCAGEN DIAGNOSTIC) injection 1 mg  Every 15 Minutes PRN         08/06/22 1719    08/06/22 1717  Diet Regular; Consistent Carbohydrate, Cardiac  Diet Effective Now         08/06/22 1719    08/06/22 1716  Inpatient Admission  Once         08/06/22 1719 08/06/22 1716  Obtain STAT EKG during chest pain. Notify MD  "of any change in rhythm, ST segments or complaints of chest pain.  Until Discontinued         08/06/22 1719 08/06/22 1716  Encourage fluids  Until Discontinued         08/06/22 1719 08/06/22 1716  Activity - Strict Bed Rest  Until Discontinued         08/06/22 1719 08/06/22 1716  Remove Radial Pressure Device / Dressing  Once         08/06/22 1719 08/06/22 1716  Vital Signs & Reverse Hero's Test (While Radial Compression Device in Place)  Per Order Details        Comments: Every 15 Minutes x4, Every 30 Minutes x4, & Every 1 Hour x2    08/06/22 1719 08/06/22 1716  Keep Affected Arm Straight & Elevated  Continuous         08/06/22 1719 08/06/22 1716  Activity As Tolerated  Until Discontinued         08/06/22 1719 08/06/22 1716  Oxygen Therapy- Nasal Cannula; Titrate for SPO2: equal to or greater than, 92%, per policy  Continuous         08/06/22 1719 08/06/22 1716  Continuous Pulse Oximetry  Continuous         08/06/22 1719 08/06/22 1716  Advance diet as tolerated  Until Discontinued         08/06/22 1719 08/06/22 1716  ECG 12 Lead  STAT         08/06/22 1719 08/06/22 1716  Notify MD if platelet count is less than 100,000, is less than 1/2 baseline, or if Hgb drops by more than 3mg/dl.  Until Discontinued         08/06/22 1719 08/06/22 1716  Notify MD of hypotension (SBP less than 95), bleeding, or dysrythmia and follow Sheath Removal Policy if needed.  Until Discontinued         08/06/22 1719 08/06/22 1716  Hold metFORMIN (GLUCOPHAGE) for 48 Hours  Continuous         08/06/22 1719 08/06/22 1716  Code Status and Medical Interventions:  Continuous         08/06/22 1719 08/06/22 1715  acetaminophen (TYLENOL) tablet 650 mg  Every 4 Hours PRN         08/06/22 1719 08/06/22 1715  HYDROcodone-acetaminophen (NORCO) 5-325 MG per tablet 1 tablet  Every 4 Hours PRN         08/06/22 1719 08/06/22 1715  morphine injection 1 mg  Every 4 Hours PRN        \"And\" Linked Group Details " "   08/06/22 1719    08/06/22 1715  naloxone (NARCAN) injection 0.4 mg  Every 5 Minutes PRN        \"And\" Linked Group Details    08/06/22 1719    08/06/22 1713  POC Activated Clotting Time  PROCEDURE ONCE         08/06/22 1646    08/06/22 1655  POC Activated Clotting Time  PROCEDURE ONCE         08/06/22 1646    08/06/22 1647  iopamidol (ISOVUE-370) 76 % injection  As Needed,   Status:  Discontinued         08/06/22 1647    08/06/22 1647  POC Activated Clotting Time  PROCEDURE ONCE         08/06/22 1555    08/06/22 1647  POC Activated Clotting Time  PROCEDURE ONCE         08/06/22 1613    08/06/22 1636  BH (CUPID ONLY) ADENOSINE 6 MG/100ML MIXTURE injection  As Needed,   Status:  Discontinued         08/06/22 1637    08/06/22 1606  eptifibatide (INTEGRILIN) 75 mg in 100 mL solution  Continuous PRN         08/06/22 1606    08/06/22 1558  nitroprusside (NIPRIDE) injection  As Needed,   Status:  Discontinued         08/06/22 1558    08/06/22 1554  eptifibatide (INTEGRILIN) bolus from bottle  As Needed,   Status:  Discontinued         08/06/22 1554    08/06/22 1552  nitroGLYCERIN 200 mcg/mL 30 mL syringe  As Needed,   Status:  Discontinued         08/06/22 1552    08/06/22 1546  heparin (porcine) injection  As Needed,   Status:  Discontinued         08/06/22 1546    08/06/22 1542  verapamil (ISOPTIN) 500 mcg, nitroglycerin (TRIDIL) 200 mcg, heparin (porcine) 3,000 Units radial artery injection  As Needed,   Status:  Discontinued         08/06/22 1542    08/06/22 1540  lidocaine (XYLOCAINE) 2% injection  As Needed,   Status:  Discontinued         08/06/22 1540    08/06/22 1538  midazolam (VERSED) injection  As Needed,   Status:  Discontinued         08/06/22 1538    08/06/22 1538  fentaNYL citrate (PF) (SUBLIMAZE) injection  As Needed,   Status:  Discontinued         08/06/22 1538    08/06/22 1537  sodium chloride 0.9 % infusion  Continuous PRN         08/06/22 1537    08/06/22 1531  Cardiac Catheterization/Vascular Study "  Once         08/06/22 1530    08/06/22 0000  Ambulatory Referral to Cardiac Rehab         08/06/22 1719    Unscheduled  Vital Signs  As Needed       08/06/22 1719    Unscheduled  Change site dressing  As Needed       08/06/22 1719    Unscheduled  Follow Hypoglycemia Standing Orders (Blood Glucose <70)  As Needed      Comments: Follow Protocol As Outlined in Process Instructions (Open Order Report to View Full Instructions)    08/06/22 1719    Unscheduled  CBC (No Diff)  As Needed       08/06/22 1826                   Physician Progress Notes       Donna Mendoza MD at 08/07/22 0946            [facility] Follow Up    Chief Complaint: Follow up inferior myocardial infarction    Interval History: No further significant chest discomfort.  No shortness of breath.  Blood pressures have remained persistently elevated.    Objective:     Objective:  Temp:  [97.8 °F (36.6 °C)-98.9 °F (37.2 °C)] 98 °F (36.7 °C)  Heart Rate:  [] 87  Resp:  [13-25] 24  BP: (130-164)/() 151/102     Intake/Output Summary (Last 24 hours) at 8/7/2022 0946  Last data filed at 8/7/2022 0100  Gross per 24 hour   Intake --   Output 1275 ml   Net -1275 ml     Body mass index is 30.34 kg/m².      08/07/22  0911   Weight: 104 kg (230 lb)     Weight change:       Physical Exam:   General : Alert, cooperative, in no acute distress.  Neuro: Alert,cooperative and oriented.  Lungs: CTAB. Normal respiratory effort and rate.  CV: Regular rate and rhythm, normal S1 and S2, no murmurs, gallops or rubs.  ABD: Soft, nontender, nondistended. Positive bowel sounds.  Extr: No edema or cyanosis, moves all extremities.  Right wrist without any evidence of bleeding or hematoma.    Lab Review:   Results from last 7 days   Lab Units 08/07/22  0333 08/06/22  1453   SODIUM mmol/L 133* 133*   POTASSIUM mmol/L 3.7 3.5   CHLORIDE mmol/L 97* 95*   CO2 mmol/L 21.3* 19.5*   BUN mg/dL 12 16   CREATININE mg/dL 0.72* 1.22   GLUCOSE mg/dL 175* 220*    CALCIUM mg/dL 8.9 10.4   AST (SGOT) U/L  --  22   ALT (SGPT) U/L  --  32     Results from last 7 days   Lab Units 08/07/22  0333 08/06/22  1453   TROPONIN T ng/mL 3.780* <0.010     Results from last 7 days   Lab Units 08/07/22  0333 08/06/22  1453   WBC 10*3/mm3 14.09* 10.29   HEMOGLOBIN g/dL 15.8 17.4   HEMATOCRIT % 43.9 48.9   PLATELETS 10*3/mm3 322 389     Results from last 7 days   Lab Units 08/06/22  1453   INR  0.98   APTT seconds 22.2*         Results from last 7 days   Lab Units 08/07/22  0333   CHOLESTEROL mg/dL 167   TRIGLYCERIDES mg/dL 190*   HDL CHOL mg/dL 33*             I reviewed the patient's new clinical results.  I personally viewed and interpreted the patient's EKG  Current Medications:   Scheduled Meds:aspirin, 81 mg, Oral, Daily  atorvastatin, 40 mg, Oral, Nightly  carvedilol, 25 mg, Oral, Q12H  insulin lispro, 0-9 Units, Subcutaneous, TID AC  [START ON 8/8/2022] losartan, 100 mg, Oral, Q24H  prasugrel, 10 mg, Oral, Daily  spironolactone, 25 mg, Oral, Daily      Continuous Infusions:     Allergies:  Allergies   Allergen Reactions   • Lisinopril Cough   • Omeprazole Other (See Comments)     Poss rash       Assessment/Plan:     1.  Inferior myocardial infarction.  Now status post drug-eluting stent placement of the mid right coronary artery.  He presented with distal embolization of his posterolateral branch likely from his mid lesion.  Some improvement in flow by the end of the procedure.    Completed Integrilin infusion.  Remains on aspirin and prasugrel.  2.  Coronary artery disease.  His remaining vessels show mild nonobstructive disease.  3.  Ischemic cardiomyopathy.  EF appeared to be around 35 to 40% by left ventriculogram with inferior wall motion abnormalities.  4.  Hypertension.  Poorly controlled.  5.  Hyperlipidemia.  Untreated.  6.  Diabetes mellitus type 2.  On metformin as an outpatient.    -Losartan dosage already increased and spironolactone added.  - We will increase carvedilol  to 25 mg.  - Continue aspirin, prasugrel, and statin therapy.  - Follow-up on echocardiogram results.  - Transfer to telemetry.  - Possible discharge home tomorrow if blood pressures are better controlled.    Donna Mendoza MD  22  09:46 EDT    Electronically signed by Donna Mendoza MD at 22 1427           [facility] CATH LAB  [address] [address] [phone]              Arnav Celaya  Cardiac Catheterization/Vascular Study  Order# [id number]  Reading physician: Donna Mendoza MD Ordering physician: Donna Mendoza MD Study date: 22      Procedures    Optical Coherent Tomography   Coronary angiography   Left ventriculography   Left Heart Cath   STENT MICHELLE - CORONARY   Percutaneous Coronary Intervention   Percutaneous Manual Thrombectomy        Patient Information    Patient Name   Arnav Celaya MRN   [id number] Legal Sex   Male  (Age)   1975 (47 y.o.)   Race Ethnicity Encounter Category   White or  Not  or  Urgent        [facility] PACS Images     Show images for Cardiac Catheterization/Vascular Study     Printable Vessel Diagram    Printable Vessel Diagram     Physicians    Panel Physicians Referring Physician Case Authorizing Physician   Donna Mendoza MD (Primary)       Indications    Inferior myocardial infarction (HCC) [I21.19 (ICD-10-CM)]     Pre Procedure Diagnosis    Inferior myocardial infarction      Post Procedure Diagnosis    Inferior myocardial infarction   Coronary artery disease status post PCI        Conclusion    CONCLUSION:  1.  Inferior myocardial infarction status post PCI  2.  Coronary artery disease:  *95% thrombotic stenosis of the mid right coronary artery status post drug-eluting stent placement with a Xience Skypoint 4.0 x 18 mm stent (postdilated with a 5.0 noncompliant balloon)   *Distal thrombosis of a large posterolateral branch due to distal embolization status post balloon angioplasty  *Mild  nonobstructive disease of the left main, left anterior descending, and left circumflex arteries     RECOMMENDATIONS:  Continue Integrilin infusion for 12 hours for treatment of distal embolization of posterolateral branch.  Continue dual antiplatelet therapy.  We will transition from clopidogrel to prasugrel tomorrow.  Start statin, beta-blocker, and ARB therapy.     FINDINGS:  SELECTIVE CORONARY ANGIOGRAMS:  1.  Left main artery: Large-caliber vessel with 0% stenosis.  The vessel bifurcates into left anterior descending and left circumflex arteries.  2.  Left anterior descending artery: Large-caliber vessel with 0% proximal stenosis.  Proximal vessel gives rise to a moderate caliber, branching first diagonal branch with no significant disease.  The mid LAD tapers to a moderate caliber vessel with luminal irregularities.  The distal vessel tapers to a small caliber with 30 to 40% stenosis at the distal tip near the apex.  3.  Left circumflex artery: Moderate caliber vessel with 0% proximal stenosis.  Proximal vessel gives rise to small caliber first obtuse marginal branch with no significant disease.  The mid vessel gives rise to a large caliber second obtuse marginal branch with scattered luminal irregularities.  Distal continuation circumflex vessel tapers to a moderate caliber vessel with 30% distal stenosis.  4.  Right coronary artery: Large-caliber vessel with 20 to 30% proximal stenosis.  The mid vessel is a 95% thrombotic stenosis.  The distal vessel has 10 to 20% stenosis.  It then bifurcates into a moderate caliber posterior descending and a large caliber posterolateral branch.  The posterior descending branch has 10 to 20% scattered stenosis.  The proximal posterior lateral branch has 30 to 40% stenosis.  Distally the vessel appears occluded presumably due to distal embolization from the mid right coronary artery lesion.     LEFT VENTRICULAR HEMODYNAMICS:  1.  Left ventricular pressure: 111/21  2.  Aortic  pressure: 109/77     LEFT VENTRICULOGRAM  Akinesis of the mid to distal inferior wall.  The remaining wall segments appear to be belen normally.  Left ventricular function appears to be mild to moderately depressed with a visually estimated ejection fraction of 35 to 40%.     MID RCA   PRE-PCI:  Stenosis: 95%  JOHN FLOW: 3  Lesion type: B1     POST-PCI:  Stenosis: 0%  JOHN FLOW: 3     DISTAL VIRAL   PRE-PCI:  Stenosis: 100%  JOHN FLOW: 0  Lesion type: C     POST-PCI:  Stenosis: 50%  JOHN FLOW: 2        PROCEDURE:  The patient presented to the [facility] emergency room with complaints of sudden onset of back and chest discomfort while playing football.  EKG performed in the emergency room showed evidence of inferior ST elevations consistent with an inferior myocardial infarction.  He was treated with heparin and given a loading dose of clopidogrel 600 mg and transferred emergently to [facility].     Following his arrival to the hospital he was brought directly to the cardiac catheterization laboratory where his right wrist was prepped and draped in a sterile manner.  1 mL of 2% lidocaine was infused subcutaneously to the right wrist.  Access to the right radial artery is obtained using a micropuncture needle followed by placement of a 5/6 Bruneian slender glide sheath.     Went up directly with a 6 Bruneian JR4 guiding catheter.  Selective angiograms were performed followed by PCI of the mid RCA and distal posterior lateral branch.  Following completion of the PCI diagnostic angiograms were completed using a 5 Bruneian FL 3.5 diagnostic catheter.  A left heart catheterization and a left ventriculogram were performed using a 5 Bruneian radial pigtail catheter.     INTERVENTION:   Based on the findings of the right coronary angiograms proceeded with PCI.  Patient was given additional unfractionated heparin to keep his ACT around 300.  He had already been loaded with 600 mg of clopidogrel and aspirin  324 mg.  Balloon angioplasty was performed of the mid right coronary artery stenosis using a trek 4.0 x 12 mm balloon which deployed a maximum pressure of 8 taran.       I turned my attention next to distal occlusion of the posterior lateral branch which appeared to be due to distal embolization from the mid RCA lesion.  Proceeded with balloon inflation using a trek 3.5 x 12 mm balloon at a low pressure of 2 to 4 taran.  There was no improvement in distal flow.  At this point the patient was given an Integrilin bolus and started on an Integrilin infusion.  Thrombectomy was attempted with an Dover catheter but again with no significant improvement in flow.  Also gave several rounds of both nitroprusside and nitroglycerin by the guiding catheter with no significant improvement.     I turned my attention back to the mid right coronary artery stenosis and proceeded with drug-eluting stent placement using a Xience Skypoint 4.0 x 18 mm stent which was deployed to maximum pressure of 12 taran.  Proceeded next with postdilatation using an NC trek 5.0 x 12 mm balloon which deployed a maximum pressure of 8 taran.  OCT imaging was then performed using a dragonfly catheter.  Due to the large size of the vessel was unable to get fully adequate imaging but it appeared that the distal reference vessel ranged from 4.75 to 5 mm in diameter while the proximal vessel was around 4.75 mm in diameter.  The central portion of the stent appeared to be well opposed but was unable to fully visualize the distal edges of the stent.  I will proceeded with repeat balloon postdilatation using an NC trek 4.5 x 15 mm balloon which was deployed to maximum pressure of 18 taran in the distal portion of the stent and 16 taran in the proximal portion of the stent.  Repeat angiogram showed that the stent appeared to be well sized and well apposed.  There was no evidence of dissection, perforation, or further distal embolization.     I turned my attention back to  the posterior lateral branch and attempted gentle balloon angioplasty again using a trek 3.5 x 12 mm balloon which deployed a maximum pressure of 3 taran.  Initially there was no improvement in flow but repeat angiograms were performed at the end of the procedure after waiting several minutes and giving additional intracoronary nitroprusside and adenosine.  These final angiogram showed that there finally appear to be improvement in the distal flow.  Of note however the patient continued to have significant ST elevations although his chest discomfort was improving.        Following completion of procedure all wires and catheters were removed.  Radial sheath was removed and a comfort band placed.  After hemostasis was achieved the patient was transferred to the coronary care unit in stable condition.      Time Under Physician Observation    Name Panel Role Time Period   Donna Mendoza MD Panel 1 Primary 8/6/2022 1502 - 8/6/2022 1715      Total Sedation Time    Moderate sedation event time was not documented.       Vessel Access    A 6 fr sheath was successfully inserted with ultrasound guidance into the right radial artery. Sheath insertion not delayed.       Sheath Disposition    Hemostasis started on the right radial artery. Radial compression device applied to vessel. Hemostasis achieved successfully. Closure device additional comment: Comfort band       Stent Inflated     Event Details User   4:01 PM Stent Inflated Stnt Cornry Rx Xience/Skypoint Rapdxng 4x18mm - First balloon inflation max pressure = 12 taran. First balloon inflation duration = 10 seconds.  JT     Balloon Inflated     Event Details User   3:48 PM Balloon Inflated Baln Trek Rx 4x12mm - First balloon inflation max pressure = 8 taran. First balloon inflation duration = 8 seconds.  JT   3:54 PM Balloon Inflated Baln Trek Rx 3.5x12mm - First balloon inflation max pressure = 4 taran. First balloon inflation duration = 5 seconds. Second inflation of balloon -  Max pressure = 4 taran. 2nd Inflation of balloon - Duration = 6 seconds. 2nd inflation was done at 15:55 EDT. The balloon is positioned in the Distal segment of the vessel. Third inflation of balloon - Max pressure = 2 taran. 3rd Inflation of balloon - Duration = 5 seconds. 3rd inflation was done at 15:55 EDT. The balloon is positioned in the Distal segment of the vessel.  JT   4:05 PM Balloon Inflated Baln Trek Nc Rx 5x12mm - First balloon inflation max pressure = 8 taran. First balloon inflation duration = 8 seconds. Second inflation of balloon - Max pressure = 8 taran. 2nd Inflation of balloon - Duration = 8 seconds. 2nd inflation was done at 16:06 EDT. The balloon is positioned in the Mid segment of the vessel.  JT   4:18 PM Balloon Inflated Baln Trek Nc Rx 4.5x15mm - First balloon inflation max pressure = 18 taran. First balloon inflation duration = 10 seconds. Second inflation of balloon - Max pressure = 16 taran. 2nd Inflation of balloon - Duration = 10 seconds. 2nd inflation was done at 16:19 EDT. The balloon is positioned in the Mid segment of the vessel.  JT   4:23 PM Balloon Inflated Baln Trek Rx 3.5x12mm - First balloon inflation max pressure = 30 taran. First balloon inflation duration = 12 seconds.  JT     Complications      Complications documented before study signed (8/6/2022  5:47 PM)       No complications were associated with this study.   Documented by Donna Mendoza MD - 8/6/2022  5:45 PM         Radiation     Event Details User   4:49 PM Radiation Tracking Panel 1: Donna Mendoza MD   Cumulative Air Kerma (mGy) = 1636.000; Fluoro Time (min) = 15.9; DAP (mGy-cm2) = 191129.000 JT

## 2022-08-08 NOTE — PROGRESS NOTES
[facility] Clinical Pharmacy Services: National Cardiology Data Registry (NCDR) Medication Review    Arnav Celaya is s/p PCI with drug-eluding stent placement for STEMI. Pharmacy to review discharge medications to make sure appropriate medications have been prescribed.    Patient has been discharged on the following:  · Aspirin 81 mg daily  · High Intensity Statin: Atorvastatin 40 mg qHS  · Beta-blocker: Carvedilol 25 mg q12h  · P2Y12 Inhibitor: Prasugrel 10 mg daily  · LVEF=55%: Losartan 50 mg daily    These medications meet the requirements for NCDR discharge medication for chest pain and MI.    Katelin Sotomayor, Pharm.D., [facility]  Clinical Pharmacist  Phone Extension #[phone]

## 2022-08-09 ENCOUNTER — TRANSITIONAL CARE MANAGEMENT TELEPHONE ENCOUNTER (OUTPATIENT)
Dept: CALL CENTER | Facility: HOSPITAL | Age: 47
End: 2022-08-09

## 2022-08-09 NOTE — OUTREACH NOTE
Prep Survey    Flowsheet Row Responses   [facility] patient discharged from? [place]   Is LACE score < 7 ? No   Emergency Room discharge w/ pulse ox? No   Eligibility [facility]   Date of Admission 08/06/22   Date of Discharge 08/08/22   Discharge Disposition Home or Self Care   Discharge diagnosis Inferior STelevation MI   Does the patient have one of the following disease processes/diagnoses(primary or secondary)? Acute MI (STEMI,NSTEMI)   Does the patient have Home health ordered? No   Is there a DME ordered? No   Prep survey completed? Yes          ALEXA SOLARES - Registered Nurse

## 2022-08-09 NOTE — OUTREACH NOTE
Call Center TCM Note    Flowsheet Row Responses   [facility] patient discharged from? [place]   Does the patient have one of the following disease processes/diagnoses(primary or secondary)? Acute MI (STEMI,NSTEMI)   TCM attempt successful? No   Unsuccessful attempts Attempt 1          Virgen Quevedo MA    8/9/2022, 12:58 EDT

## 2022-08-09 NOTE — OUTREACH NOTE
Call Center TCM Note    Flowsheet Row Responses   [facility] [place] patient discharged from? [place]   Does the patient have one of the following disease processes/diagnoses(primary or secondary)? Acute MI (STEMI,NSTEMI)   TCM attempt successful? Yes   Discharge diagnosis Inferior STelevation MI   Meds reviewed with patient/caregiver? Yes   Is the patient having any side effects they believe may be caused by any medication additions or changes? No   Does the patient have all prescriptions related to this admission filled (includes statins,anticoagulants,HTN meds,anti-arrhythmia meds) Yes   Is the patient taking all medications as directed (includes completed medication regime)? Yes   Does the patient have a primary care provider?  Yes   Does the patient have an appointment with their PCP,cardiologist,or clinic within 7 days of discharge? Yes   Has the patient kept scheduled appointments due by today? N/A   Has home health visited the patient within 72 hours of discharge? N/A   Psychosocial issues? No   Did the patient receive a copy of their discharge instructions? Yes   Nursing interventions Reviewed instructions with patient   What is the patient's perception of their health status since discharge? Improving   Is the patient/caregiver able to teach back signs and symptoms of when to call for help immediately: Sudden chest discomfort, Nausea or vomiting, Dizziness or lightheadedness, Sudden discomfort in arms, back, neck or jaw, Shortness of breath at any time, Irregular or rapid heart rate, Sudden sweating or clammy skin   Nursing interventions Nurse provided patient education   Is the patient/caregiver able to teach back ways to prevent a second heart attack: Manage risk factors, Get support (AHA website:supportnetwork.heart.org), Follow up with MD, Participate in Cardiac Rehab   If the patient is a current smoker, are they able to teach back resources for cessation? Not a smoker   Is the patient/caregiver able  to teach back the hierarchy of who to call/visit for symptoms/problems? PCP, Specialist, Home health nurse, Urgent Care, ED, 911 Yes   TCM call completed? Yes   Wrap up additional comments D/C DX: Inferior STelevation MI,  Heart cath, stent. pci, thrombectomy** Pt feeling pretty well. tires easily. Tylenol more or less managing pain. No SOB, no fever, chills, no N/V. Cath site good. All his many new medications in place. TCM APPT with PCP Dr Weber is tomorrow 08/10/2022, CARDIO MD follow up is 08/15/2022.           Virgen Quevedo MA    8/9/2022, 16:10 EDT

## 2022-08-10 ENCOUNTER — OFFICE VISIT (OUTPATIENT)
Dept: INTERNAL MEDICINE | Facility: CLINIC | Age: 47
End: 2022-08-10

## 2022-08-10 VITALS
BODY MASS INDEX: 30.4 KG/M2 | OXYGEN SATURATION: 98 % | HEIGHT: 73 IN | TEMPERATURE: 97.7 F | WEIGHT: 229.4 LBS | SYSTOLIC BLOOD PRESSURE: 98 MMHG | HEART RATE: 66 BPM | DIASTOLIC BLOOD PRESSURE: 60 MMHG

## 2022-08-10 DIAGNOSIS — I10 ESSENTIAL HYPERTENSION: ICD-10-CM

## 2022-08-10 DIAGNOSIS — Z09 HOSPITAL DISCHARGE FOLLOW-UP: Primary | ICD-10-CM

## 2022-08-10 DIAGNOSIS — E11.9 TYPE 2 DIABETES MELLITUS WITHOUT COMPLICATION, WITHOUT LONG-TERM CURRENT USE OF INSULIN: ICD-10-CM

## 2022-08-10 DIAGNOSIS — E78.5 DYSLIPIDEMIA (HIGH LDL; LOW HDL): ICD-10-CM

## 2022-08-10 DIAGNOSIS — I21.19 INFERIOR MYOCARDIAL INFARCTION: ICD-10-CM

## 2022-08-10 PROCEDURE — 99495 TRANSJ CARE MGMT MOD F2F 14D: CPT | Performed by: INTERNAL MEDICINE

## 2022-08-10 RX ORDER — CARVEDILOL 25 MG/1
12.5 TABLET ORAL EVERY 12 HOURS SCHEDULED
Qty: 180 TABLET | Refills: 3
Start: 2022-08-10 | End: 2022-09-27

## 2022-08-10 NOTE — PROGRESS NOTES
Transitional Care Follow Up Visit  Subjective     Arnav Celaya is a 47 y.o. male who presents for a transitional care management visit.    Within 48 business hours after discharge our office contacted him via telephone to coordinate his care and needs.      I reviewed and discussed the details of that call along with the discharge summary, hospital problems, inpatient lab results, inpatient diagnostic studies, and consultation reports with Arnav.     Current outpatient and discharge medications have been reconciled for the patient.  Reviewed by: Lynn Weber MD      Date of TCM Phone Call 8/8/2022   [facility]   Date of Admission 8/6/2022   Date of Discharge 8/8/2022   Discharge Disposition Home or Self Care     Risk for Readmission (LACE) Score: 9 (8/8/2022  6:01 AM)      History of Present Illness   Course During Hospital Stay: Pt was admitted 8/6-8/8 for an acute MI.  He played in a [employer] football game and had shortness of breath after playing and didn't feel well.  He has a stent placed in his RCA.   He is on asa, effient, carvedilol, losartan, statin, spironolactone.  No other chest pain.  He has had some dizziness and headaches.  bp low in office today.      T2DM - a1c 9-> 7.5 -> 7.4  He has been working on diet and a1c coming down.      htn - bp now on low side.    S/p MI he has had some anxiety and PTSD sx.     The following portions of the patient's history were reviewed and updated as appropriate: allergies, current medications, past family history, past medical history, past social history, past surgical history and problem list.    Review of Systems   Constitutional: Negative.    Eyes: Negative.    Respiratory: Negative.    Cardiovascular: Negative.    Gastrointestinal: Negative.    Endocrine: Negative.    Genitourinary: Negative.    Musculoskeletal: Negative.    Skin: Negative.    Allergic/Immunologic: Negative.    Neurological: Positive for dizziness  and headaches.   Hematological: Negative.    Psychiatric/Behavioral: Positive for sleep disturbance. Negative for self-injury and suicidal ideas. The patient is nervous/anxious.    All other systems reviewed and are negative.      Objective      Wt Readings from Last 3 Encounters:   08/10/22 104 kg (229 lb 6.4 oz)   08/07/22 104 kg (230 lb)   08/06/22 104 kg (230 lb)     Temp Readings from Last 3 Encounters:   08/10/22 97.7 °F (36.5 °C)   08/08/22 98 °F (36.7 °C)   08/06/22 98.9 °F (37.2 °C) (Oral)     BP Readings from Last 3 Encounters:   08/10/22 98/60   08/08/22 107/75   08/06/22 (!) 140/102     Pulse Readings from Last 3 Encounters:   08/10/22 66   08/08/22 78   08/06/22 85     Physical Exam  Vitals and nursing note reviewed.   Constitutional:       General: He is not in acute distress.     Appearance: He is well-developed.   HENT:      Head: Normocephalic and atraumatic.      Right Ear: External ear normal.      Left Ear: External ear normal.      Nose: Nose normal.   Eyes:      Conjunctiva/sclera: Conjunctivae normal.      Pupils: Pupils are equal, round, and reactive to light.   Cardiovascular:      Rate and Rhythm: Normal rate and regular rhythm.      Heart sounds: Normal heart sounds.   Pulmonary:      Effort: Pulmonary effort is normal. No respiratory distress.      Breath sounds: Normal breath sounds. No wheezing.   Musculoskeletal:         General: Normal range of motion.      Cervical back: Normal range of motion and neck supple.      Comments: Normal gait   Skin:     General: Skin is warm and dry.   Neurological:      General: No focal deficit present.      Mental Status: He is alert and oriented to person, place, and time.   Psychiatric:         Mood and Affect: Mood normal.         Behavior: Behavior normal.         Thought Content: Thought content normal.         Judgment: Judgment normal.       Common labs    Common Labsle 8/6/22 8/6/22 8/7/22 8/7/22 8/7/22 8/7/22 8/8/22    1453 1453 0333 0333 0333  0333    Glucose  220 (A)   175 (A)  203 (A)   BUN  16   12  16   Creatinine  1.22   0.72 (A)  0.96   Sodium  133 (A)   133 (A)  136   Potassium  3.5   3.7  3.8   Chloride  95 (A)   97 (A)  100   Calcium  10.4   8.9  9.1   Albumin  5.00        Total Bilirubin  0.7        Alkaline Phosphatase  57        AST (SGOT)  22        ALT (SGPT)  32        WBC 10.29  14.09 (A)       Hemoglobin 17.4  15.8       Hematocrit 48.9  43.9       Platelets 389  322       Total Cholesterol      167    Triglycerides      190 (A)    HDL Cholesterol      33 (A)    LDL Cholesterol       101 (A)    Hemoglobin A1C    7.40 (A)      (A) Abnormal value              Assessment & Plan   Diagnoses and all orders for this visit:    1. Hospital discharge follow-up (Primary)    2. Inferior myocardial infarction (HCC)  -     carvedilol (COREG) 25 MG tablet; Take 0.5 tablets by mouth Every 12 (Twelve) Hours.  Dispense: 180 tablet; Refill: 3    3. Essential hypertension - decrease coreg bc of hypotension  -     carvedilol (COREG) 25 MG tablet; Take 0.5 tablets by mouth Every 12 (Twelve) Hours.  Dispense: 180 tablet; Refill: 3    4. Dyslipidemia (high LDL; low HDL) - cont statin    5. Type 2 diabetes mellitus without complication, without long-term current use of insulin (HCC)  -     CBC & Differential; Future  -     Comprehensive Metabolic Panel; Future  -     Hemoglobin A1c; Future  -     Lipid Panel With LDL / HDL Ratio; Future  -     Microalbumin / Creatinine Urine Ratio - Urine, Clean Catch; Future  -     T4, Free; Future  -     TSH; Future          Return in about 3 months (around 11/10/2022) for Recheck, labs.

## 2022-08-11 LAB
GLUCOSE BLDC GLUCOMTR-MCNC: 171 MG/DL (ref 70–130)
GLUCOSE BLDC GLUCOMTR-MCNC: 236 MG/DL (ref 70–130)

## 2022-08-15 ENCOUNTER — READMISSION MANAGEMENT (OUTPATIENT)
Dept: CALL CENTER | Facility: HOSPITAL | Age: 47
End: 2022-08-15

## 2022-08-15 ENCOUNTER — OFFICE VISIT (OUTPATIENT)
Dept: CARDIOLOGY | Facility: CLINIC | Age: 47
End: 2022-08-15

## 2022-08-15 VITALS
WEIGHT: 222 LBS | HEIGHT: 73 IN | OXYGEN SATURATION: 97 % | BODY MASS INDEX: 29.42 KG/M2 | HEART RATE: 78 BPM | SYSTOLIC BLOOD PRESSURE: 100 MMHG | RESPIRATION RATE: 16 BRPM | DIASTOLIC BLOOD PRESSURE: 70 MMHG

## 2022-08-15 DIAGNOSIS — I10 ESSENTIAL HYPERTENSION: ICD-10-CM

## 2022-08-15 DIAGNOSIS — I10 PRIMARY HYPERTENSION: ICD-10-CM

## 2022-08-15 DIAGNOSIS — I25.5 ISCHEMIC CARDIOMYOPATHY: ICD-10-CM

## 2022-08-15 DIAGNOSIS — E78.5 HYPERLIPIDEMIA, UNSPECIFIED HYPERLIPIDEMIA TYPE: ICD-10-CM

## 2022-08-15 DIAGNOSIS — I21.19 INFERIOR MYOCARDIAL INFARCTION: Primary | ICD-10-CM

## 2022-08-15 PROCEDURE — 99214 OFFICE O/P EST MOD 30 MIN: CPT | Performed by: NURSE PRACTITIONER

## 2022-08-15 PROCEDURE — 93000 ELECTROCARDIOGRAM COMPLETE: CPT | Performed by: NURSE PRACTITIONER

## 2022-08-15 NOTE — OUTREACH NOTE
AMI Week 2 Survey    Flowsheet Row Responses   [facility]   Does the patient have one of the following disease processes/diagnoses(primary or secondary)? Acute MI (STEMI,NSTEMI)   Week 2 attempt successful? Yes   Call start time 1321   Call end time 1324   Discharge diagnosis Inferior STelevation MI   Medication alerts for this patient PCP lowered carvedilol to 12.5mg BID   Meds reviewed with patient/caregiver? Yes   Is the patient having any side effects they believe may be caused by any medication additions or changes? No   Does the patient have all prescriptions related to this admission filled (includes statins,anticoagulants,HTN meds,anti-arrhythmia meds) Yes   Is the patient taking all medications as directed (includes completed medication regime)? Yes   Comments regarding appointments Cardiology appt. 8/15/22   Does the patient have a primary care provider?  Yes   Does the patient have an appointment with their PCP,cardiologist,or clinic within 7 days of discharge? Yes   Has the patient kept scheduled appointments due by today? Yes   Has home health visited the patient within 72 hours of discharge? N/A   Psychosocial issues? No   What is the patient's perception of their health status since discharge? Improving   Nursing interventions Nurse provided patient education   Is the patient/caregiver able to teach back signs and symptoms of when to call for help immediately: Sudden chest discomfort, Shortness of breath at any time, Irregular or rapid heart rate   Nursing interventions Nurse provided patient education   Is the pateint /caregiver able to teach back the importance of cardiac rehab? Yes   Nursing interventions Provided education on importance of cardiac rehab   Is the patient/caregiver able to teach back lifestyle changes to help prevent MIs Managing diabetes, Heart healthy diet, Reducing stress   Is the patient/caregiver able to teach back ways to prevent a  second heart attack: Take medications, Follow up with MD   Is the patient/caregiver able to teach back the hierarchy of who to call/visit for symptoms/problems? PCP, Specialist, Home health nurse, Urgent Care, ED, 911 Yes   Week 2 call completed? Yes          JOSE C SOLARES - Registered Nurse

## 2022-08-15 NOTE — PROGRESS NOTES
Date of Office Visit: 08/15/2022  Encounter Provider: EUGENIO Mattson  Place of Service: [facility] CARDIOLOGY  Patient Name: Arnav Celaya  :1975  Primary Cardiologist: Dr. Agudelo      CC:  Hospital follow up    Dear Dr. Weber    HPI: Arnav Celaya is a pleasant 47 y.o. male who presents 08/15/2022 for cardiac follow up. He is a new patient to me and I have reviewed his past medical records.  He saw Dr. Mendoza in consultation during his recent hospitalization.  He wishes to be followed in [place] as this is where he lives.  He wishes to follow with Dr. Agudelo.  He has a history of  hypertension, per lipidemia, diabetes mellitus type 2, prior tobacco use.     Om 2022,  he stated  that he was out playing football  when he had a sudden onset chest and back pain.  This was associated with some increase shortness of breath.  He ended up driving himself to the [facility] emergency room where an EKG was performed showing evidence of inferior ST elevations concerning for an inferior myocardial infarction.  He was treated with heparin infusion and given a loading dose of clopidogrel and transferred emergently to [facility].     Following his arrival to the hospital he was brought directly to the cardiac catheterization laboratory where coronary angiograms revealed a 95% thrombotic stenosis of his mid right coronary artery along with distal embolization of a large posterolateral branch.  He underwent drug-eluting stent placement of the mid right coronary artery with a Xience Skypoint 4.0 x 18 mm stent (postdilated with a 5.0 mm balloon).  The posterolateral distal embolization was treated with balloon angioplasty and thrombectomy initially without much improvement in flow.  Patient was started on Integrilin infusion and given several rounds of intracoronary nitroglycerin, nitroprusside, and adenosine.  The final images of the right coronary artery there  "appear to be some improvement in flow in the posterior lateral branch.    Left ventriculogram does show inferior wall motion abnormalities with mild to moderately depressed left ventricular systolic function with EF 35-40%  He was started on GDMT with carvedilol, losartan and aldactone.  He was discharged on 8/8/2022.    He presents today with his wife.  He states last Wednesday he had a very emotional day.  He states the past 4 days have been really good and he continues to feel better.  He had quite a scare and is now totally committed to his health.  He has always been very active in sports and always \"on the go\".  He had several questions that were answered he and his wife satisfaction.  He is going to attend cardiac rehab.  He is going to go back to work next Monday, he is a  for [employer].  He denies any palpitations, lower extremity edema, dizziness or lightheadedness.  He has not had any further chest pain or chest pressure.  He states he is still a little fatigued but that is improving daily.  He states that he has had a little bit feelings of panic again that mostly occurred last Wednesday when everything \"hit him\".  His blood pressure was low and he states she had cut his carvedilol back to 12.5 mg twice daily.  He is tolerating his other medications.  His right wrist site is well-healed with a strong radial pulse and brisk capillary refill.     Past Medical History:   Diagnosis Date   • Hemochromatosis        Past Surgical History:   Procedure Laterality Date   • CARDIAC CATHETERIZATION N/A 8/6/2022    Procedure: Left Heart Cath;  Surgeon: Donna Mendoza MD;  Location: [facility] CATH INVASIVE LOCATION;  Service: Cardiovascular;  Laterality: N/A;   • CARDIAC CATHETERIZATION N/A 8/6/2022    Procedure: Coronary angiography;  Surgeon: Donna Mendoza MD;  Location:  [facility] CATH INVASIVE LOCATION;  Service: Cardiovascular;  Laterality: N/A;   • CARDIAC CATHETERIZATION N/A 8/6/2022    Procedure: Left " ventriculography;  Surgeon: Donna Mendoza MD;  Location:  [facility] CATH INVASIVE LOCATION;  Service: Cardiovascular;  Laterality: N/A;   • CARDIAC CATHETERIZATION  8/6/2022    Procedure: STENT MICHELLE - CORONARY;  Surgeon: Donna Mendoza MD;  Location:  [facility] CATH INVASIVE LOCATION;  Service: Cardiovascular;;   • CARDIAC CATHETERIZATION N/A 8/6/2022    Procedure: Optical Coherent Tomography;  Surgeon: Donna Mendoza MD;  Location:  [facility] CATH INVASIVE LOCATION;  Service: Cardiovascular;  Laterality: N/A;   • CARDIAC CATHETERIZATION  8/6/2022    Procedure: Percutaneous Manual Thrombectomy;  Surgeon: Donna Mendoza MD;  Location:  [facility] CATH INVASIVE LOCATION;  Service: Cardiovascular;;   • CARDIAC CATHETERIZATION N/A 8/6/2022    Procedure: Percutaneous Coronary Intervention;  Surgeon: Donna Mendoza MD;  Location:  [facility] CATH INVASIVE LOCATION;  Service: Cardiovascular;  Laterality: N/A;       Social History     Socioeconomic History   • Marital status:    Tobacco Use   • Smoking status: Former Smoker     Packs/day: 0.25     Years: 10.00     Pack years: 2.50     Types: Cigarettes     Quit date: 1/1/2019     Years since quitting: 3.6   • Smokeless tobacco: Never Used   • Tobacco comment: Was on and off smoking until around 2019   Substance and Sexual Activity   • Alcohol use: Yes     Alcohol/week: 1.0 standard drink     Types: 1 Cans of beer per week     Comment: socially   • Drug use: No   • Sexual activity: Defer       Family History   Problem Relation Age of Onset   • Heart attack Paternal Aunt    • Heart attack Paternal Grandfather        The following portion of the patient's history were reviewed and updated as appropriate: past medical history, past surgical history, past social history, past family history, allergies, current medications, and problem list.    Review of Systems   Constitutional: Positive for malaise/fatigue (improving). Negative for diaphoresis and fever.   HENT: Negative for  congestion, hearing loss, hoarse voice, nosebleeds and sore throat.    Eyes: Negative for photophobia, vision loss in left eye, vision loss in right eye and visual disturbance.   Cardiovascular: Negative for chest pain, dyspnea on exertion, irregular heartbeat, leg swelling, near-syncope, orthopnea, palpitations, paroxysmal nocturnal dyspnea and syncope.   Respiratory: Negative for cough, hemoptysis, shortness of breath, sleep disturbances due to breathing, snoring, sputum production and wheezing.    Endocrine: Negative for cold intolerance, heat intolerance, polydipsia, polyphagia and polyuria.   Hematologic/Lymphatic: Negative for bleeding problem. Does not bruise/bleed easily.   Skin: Negative for color change, dry skin, poor wound healing, rash and suspicious lesions.   Musculoskeletal: Negative for arthritis, back pain, falls, gout, joint pain, joint swelling, muscle cramps, muscle weakness and myalgias.   Gastrointestinal: Negative for bloating, abdominal pain, constipation, diarrhea, dysphagia, melena, nausea and vomiting.   Neurological: Negative for excessive daytime sleepiness, dizziness, headaches, light-headedness, loss of balance, numbness, paresthesias, seizures, vertigo and weakness.   Psychiatric/Behavioral: Negative for depression, memory loss and substance abuse. The patient is not nervous/anxious.        Allergies   Allergen Reactions   • Lisinopril Cough   • Omeprazole Other (See Comments)     Poss rash         Current Outpatient Medications:   •  aspirin 81 MG chewable tablet, Chew 1 tablet Daily., Disp: 90 tablet, Rfl: 3  •  atorvastatin (LIPITOR) 40 MG tablet, Take 1 tablet by mouth Every Night., Disp: 90 tablet, Rfl: 3  •  carvedilol (COREG) 25 MG tablet, Take 0.5 tablets by mouth Every 12 (Twelve) Hours., Disp: 180 tablet, Rfl: 3  •  cetirizine (zyrTEC) 10 MG tablet, Take 1 tablet by mouth Daily., Disp: 90 tablet, Rfl: 3  •  fluticasone (Flonase) 50 MCG/ACT nasal spray, 2 sprays into the  "nostril(s) as directed by provider Daily., Disp: 48 g, Rfl: 3  •  glucose blood test strip, Use as instructed daily, Disp: 100 each, Rfl: 1  •  glucose monitor monitoring kit, 1 each As Needed (daily to check glucose)., Disp: 1 each, Rfl: 0  •  Lancet Devices misc, 1 each Daily As Needed (to check glucose)., Disp: 100 each, Rfl: 1  •  losartan (COZAAR) 50 MG tablet, Take 1 tablet by mouth Daily., Disp: 90 tablet, Rfl: 3  •  metFORMIN ER (Glucophage XR) 500 MG 24 hr tablet, Take 2 tablets by mouth Daily With Breakfast., Disp: 180 tablet, Rfl: 1  •  nitroglycerin (NITROSTAT) 0.4 MG SL tablet, 1 under the tongue as needed for angina, may repeat q5mins for up three doses, Disp: 25 tablet, Rfl: 1  •  prasugrel (EFFIENT) 10 MG tablet, Take 1 tablet by mouth Daily., Disp: 90 tablet, Rfl: 3  •  spironolactone (ALDACTONE) 25 MG tablet, Take 1 tablet by mouth Daily., Disp: 90 tablet, Rfl: 3        Objective:     Vitals:    08/15/22 0925   BP: 100/70   Pulse: 78   Resp: 16   SpO2: 97%   Weight: 101 kg (222 lb)   Height: 185.4 cm (73\")     Body mass index is 29.29 kg/m².      Vitals reviewed.   Constitutional:       General: Not in acute distress.     Appearance: Healthy appearance. Well-developed.   Eyes:      General:         Right eye: No discharge.         Left eye: No discharge.      Conjunctiva/sclera: Conjunctivae normal.   HENT:      Head: Normocephalic and atraumatic.      Right Ear: External ear normal.      Left Ear: External ear normal.      Nose: Nose normal.   Neck:      Thyroid: No thyromegaly.      Vascular: No JVD.      Trachea: No tracheal deviation.      Lymphadenopathy: No cervical adenopathy.   Pulmonary:      Effort: Pulmonary effort is normal. No respiratory distress.      Breath sounds: Normal breath sounds. No wheezing. No rales.   Chest:      Chest wall: Not tender to palpatation.   Cardiovascular:      Normal rate. Regular rhythm.      No gallop.   Pulses:     Intact distal pulses.   Edema:     " Peripheral edema absent.   Abdominal:      General: There is no distension.      Palpations: Abdomen is soft.      Tenderness: There is no abdominal tenderness.   Musculoskeletal: Normal range of motion.         General: No tenderness or deformity.      Cervical back: Normal range of motion and neck supple. Skin:     General: Skin is warm and dry.      Findings: No erythema or rash.   Neurological:      Mental Status: Alert and oriented to person, place, and time.      Coordination: Coordination normal.   Psychiatric:         Attention and Perception: Attention normal.         Behavior: Behavior normal. Behavior is cooperative.         Thought Content: Thought content normal.         Cognition and Memory: Cognition normal.         Judgment: Judgment normal.               ECG 12 Lead    Date/Time: 8/15/2022 9:29 AM  Performed by: Glenis Bradley APRN  Authorized by: Glenis Bradley APRN   Comparison: compared with previous ECG from 8/8/2022  Similar to previous ECG  Rhythm: sinus rhythm  Rate: normal  Conduction: conduction normal  Q waves: II, III, aVF, V5 and V6    ST Depression: aVR and V1  T inversion: II, III, aVR, aVF, V4, V5 and V6  QRS axis: indeterminate    Clinical impression: abnormal EKG              Assessment:       Diagnosis Plan   1. Inferior myocardial infarction (HCC)     2. Essential hypertension     3. Ischemic cardiomyopathy     4. Primary hypertension     5. Hyperlipidemia, unspecified hyperlipidemia type            Plan:       1/2. Inferior STelevation MI/ CAD -  RCA s/p Stent and PTCA of distal embolization of PLB.  He is on DAPT.  He is planning on attending cardiac rehab.  Denies angina  3. Ischemic Cardiomyopathy improved on echo - He is on GDMT.  Carvedilol had to be cut back due to low BP.  Tolerating at current doses.  Will plan to recheck echo in 3 months.  4. Hypertension - well controlled  5. Hyperlipidemia - He is on atorvastatin.  6. Diabetes Mellitus type 2     Long discussion with  patient and his wife.  He states he is totally committed to his health.  He is very active and wants to stay active.  He is planning to attend cardiac rehab.  We did discuss healthy heart diet and controlling his diabetes.  He and his wife voiced understanding.    RTO in 4 to 6 weeks JK as he wishes to be followed in [place] and requested her.    As always, it has been a pleasure to participate in your patient's care. Thank you.       Sincerely,       EUGENIO Mattson      Current Outpatient Medications:   •  aspirin 81 MG chewable tablet, Chew 1 tablet Daily., Disp: 90 tablet, Rfl: 3  •  atorvastatin (LIPITOR) 40 MG tablet, Take 1 tablet by mouth Every Night., Disp: 90 tablet, Rfl: 3  •  carvedilol (COREG) 25 MG tablet, Take 0.5 tablets by mouth Every 12 (Twelve) Hours., Disp: 180 tablet, Rfl: 3  •  cetirizine (zyrTEC) 10 MG tablet, Take 1 tablet by mouth Daily., Disp: 90 tablet, Rfl: 3  •  fluticasone (Flonase) 50 MCG/ACT nasal spray, 2 sprays into the nostril(s) as directed by provider Daily., Disp: 48 g, Rfl: 3  •  glucose blood test strip, Use as instructed daily, Disp: 100 each, Rfl: 1  •  glucose monitor monitoring kit, 1 each As Needed (daily to check glucose)., Disp: 1 each, Rfl: 0  •  Lancet Devices misc, 1 each Daily As Needed (to check glucose)., Disp: 100 each, Rfl: 1  •  losartan (COZAAR) 50 MG tablet, Take 1 tablet by mouth Daily., Disp: 90 tablet, Rfl: 3  •  metFORMIN ER (Glucophage XR) 500 MG 24 hr tablet, Take 2 tablets by mouth Daily With Breakfast., Disp: 180 tablet, Rfl: 1  •  nitroglycerin (NITROSTAT) 0.4 MG SL tablet, 1 under the tongue as needed for angina, may repeat q5mins for up three doses, Disp: 25 tablet, Rfl: 1  •  prasugrel (EFFIENT) 10 MG tablet, Take 1 tablet by mouth Daily., Disp: 90 tablet, Rfl: 3  •  spironolactone (ALDACTONE) 25 MG tablet, Take 1 tablet by mouth Daily., Disp: 90 tablet, Rfl: 3      Dictated utilizing Dragon dictation

## 2022-08-24 ENCOUNTER — READMISSION MANAGEMENT (OUTPATIENT)
Dept: CALL CENTER | Facility: HOSPITAL | Age: 47
End: 2022-08-24

## 2022-08-24 NOTE — OUTREACH NOTE
AMI Week 3 Survey    Flowsheet Row Responses   [facility] patient discharged from? [place]   Does the patient have one of the following disease processes/diagnoses(primary or secondary)? Acute MI (STEMI,NSTEMI)   Week 3 attempt successful? Yes   Call start time 1156   Call end time 1208   Discharge diagnosis Inferior STelevation MI   Person spoke with today (if not patient) and relationship patient   Meds reviewed with patient/caregiver? Yes   Is the patient having any side effects they believe may be caused by any medication additions or changes? No   Does the patient have all prescriptions related to this admission filled (includes statins,anticoagulants,HTN meds,anti-arrhythmia meds) Yes   Is the patient taking all medications as directed (includes completed medication regime)? Yes   Does the patient have a primary care provider?  Yes   Does the patient have an appointment with their PCP,cardiologist,or clinic within 7 days of discharge? Yes   Comments regarding PCP 8/10/22   Has the patient kept scheduled appointments due by today? Yes   Psychosocial issues? No   What is the patient's perception of their health status since discharge? Improving   Is the patient/caregiver able to teach back signs and symptoms of when to call for help immediately: Sudden discomfort in arms, back, neck or jaw, Sudden chest discomfort, Sudden sweating or clammy skin, Shortness of breath at any time, Irregular or rapid heart rate, Nausea or vomiting   Nursing interventions Nurse provided patient education   Is the patient/caregiver able to teach back lifestyle changes to help prevent MIs Reducing stress, Regular exercise as approved by provider, Heart healthy diet, Maintaining a healthy weight   Is the patient/caregiver able to teach back ways to prevent a second heart attack: Follow up with MD, Take medications   Week 3 call completed? Yes   Wrap up additional comments Pt states he is doing great. Pt shares he is feeling  better since Cardiologist decreased dose of Coreg to 12.5 mg 2x daily. Pt had PCP/Cardiologist fu appts.           PANDA SINGLETON - Registered Nurse

## 2022-09-07 ENCOUNTER — TREATMENT (OUTPATIENT)
Dept: CARDIAC REHAB | Facility: HOSPITAL | Age: 47
End: 2022-09-07

## 2022-09-07 DIAGNOSIS — Z95.5 S/P DRUG ELUTING CORONARY STENT PLACEMENT: Primary | ICD-10-CM

## 2022-09-07 PROCEDURE — 82962 GLUCOSE BLOOD TEST: CPT

## 2022-09-07 PROCEDURE — 93797 PHYS/QHP OP CAR RHAB WO ECG: CPT

## 2022-09-07 PROCEDURE — 93798 PHYS/QHP OP CAR RHAB W/ECG: CPT

## 2022-09-09 ENCOUNTER — TREATMENT (OUTPATIENT)
Dept: CARDIAC REHAB | Facility: HOSPITAL | Age: 47
End: 2022-09-09

## 2022-09-09 DIAGNOSIS — Z95.5 S/P DRUG ELUTING CORONARY STENT PLACEMENT: Primary | ICD-10-CM

## 2022-09-09 LAB
GLUCOSE BLDC GLUCOMTR-MCNC: 201 MG/DL (ref 70–130)
GLUCOSE BLDC GLUCOMTR-MCNC: 90 MG/DL (ref 70–130)

## 2022-09-09 PROCEDURE — 93798 PHYS/QHP OP CAR RHAB W/ECG: CPT

## 2022-09-09 PROCEDURE — 82962 GLUCOSE BLOOD TEST: CPT

## 2022-09-12 ENCOUNTER — APPOINTMENT (OUTPATIENT)
Dept: CARDIAC REHAB | Facility: HOSPITAL | Age: 47
End: 2022-09-12

## 2022-09-13 ENCOUNTER — OFFICE VISIT (OUTPATIENT)
Dept: INTERNAL MEDICINE | Facility: CLINIC | Age: 47
End: 2022-09-13

## 2022-09-13 VITALS
DIASTOLIC BLOOD PRESSURE: 60 MMHG | SYSTOLIC BLOOD PRESSURE: 80 MMHG | HEART RATE: 81 BPM | TEMPERATURE: 97.3 F | OXYGEN SATURATION: 98 % | WEIGHT: 225 LBS | HEIGHT: 73 IN | BODY MASS INDEX: 29.82 KG/M2

## 2022-09-13 DIAGNOSIS — I95.2 HYPOTENSION DUE TO DRUGS: ICD-10-CM

## 2022-09-13 DIAGNOSIS — E11.9 TYPE 2 DIABETES MELLITUS WITHOUT COMPLICATION, WITHOUT LONG-TERM CURRENT USE OF INSULIN: ICD-10-CM

## 2022-09-13 DIAGNOSIS — B34.9 VIRAL ILLNESS: Primary | ICD-10-CM

## 2022-09-13 DIAGNOSIS — R05.9 COUGH: ICD-10-CM

## 2022-09-13 LAB
EXPIRATION DATE: NORMAL
INTERNAL CONTROL: NORMAL
Lab: NORMAL
SARS-COV-2 AG UPPER RESP QL IA.RAPID: NOT DETECTED

## 2022-09-13 PROCEDURE — 87426 SARSCOV CORONAVIRUS AG IA: CPT | Performed by: INTERNAL MEDICINE

## 2022-09-13 PROCEDURE — 99214 OFFICE O/P EST MOD 30 MIN: CPT | Performed by: INTERNAL MEDICINE

## 2022-09-13 NOTE — PATIENT INSTRUCTIONS
Hold bp meds and push fluids.    - carvedilol  - losartan  - spironolactone    Start bp meds back 1 at a time when sbp 120 or higher.  Start meds in this order  1- carvedilol  2- losartan  3- spironolactone

## 2022-09-13 NOTE — PROGRESS NOTES
Arnav Celaya is a 47 y.o. male, who presents with a chief complaint of   Chief Complaint   Patient presents with   • Dizziness   • Fatigue           HPI   Pt here bc he doesn't feel well.  He just had no energy and felt off starting yesterday.  covid test neg yesterday.  He has had some dizziness.  He had a temp last night of 101.  He is drinking fluids but hasn't had a lot today.   Yesterday his bp was 110/78.      The following portions of the patient's history were reviewed and updated as appropriate: allergies, current medications, past family history, past medical history, past social history, past surgical history and problem list.    Allergies: Lisinopril and Omeprazole    Review of Systems   Constitutional: Positive for chills and fever.   HENT: Negative.    Eyes: Negative.    Respiratory: Negative.    Cardiovascular: Negative.    Gastrointestinal: Negative.    Endocrine: Negative.    Genitourinary: Negative.    Musculoskeletal: Negative.    Skin: Negative.    Allergic/Immunologic: Negative.    Neurological: Negative.    Hematological: Negative.    Psychiatric/Behavioral: Negative.    All other systems reviewed and are negative.            Wt Readings from Last 3 Encounters:   09/13/22 102 kg (225 lb)   08/15/22 101 kg (222 lb)   08/10/22 104 kg (229 lb 6.4 oz)     Temp Readings from Last 3 Encounters:   09/13/22 97.3 °F (36.3 °C)   08/10/22 97.7 °F (36.5 °C)   08/08/22 98 °F (36.7 °C)     BP Readings from Last 3 Encounters:   09/13/22 (!) 80/60   08/15/22 100/70   08/10/22 98/60     Pulse Readings from Last 3 Encounters:   09/13/22 81   08/15/22 78   08/10/22 66     Body mass index is 29.69 kg/m².  SpO2 Readings from Last 3 Encounters:   09/13/22 98%   08/15/22 97%   08/10/22 98%          Physical Exam  Vitals and nursing note reviewed.   Constitutional:       General: He is not in acute distress.     Appearance: He is well-developed.   HENT:      Head: Normocephalic and atraumatic.      Right  Ear: External ear normal.      Left Ear: External ear normal.      Nose: Nose normal.   Eyes:      Conjunctiva/sclera: Conjunctivae normal.      Pupils: Pupils are equal, round, and reactive to light.   Cardiovascular:      Rate and Rhythm: Normal rate and regular rhythm.      Heart sounds: Normal heart sounds.   Pulmonary:      Effort: Pulmonary effort is normal. No respiratory distress.      Breath sounds: Normal breath sounds. No wheezing.   Musculoskeletal:         General: Normal range of motion.      Cervical back: Normal range of motion and neck supple.      Comments: Normal gait   Skin:     General: Skin is warm and dry.   Neurological:      Mental Status: He is alert and oriented to person, place, and time.   Psychiatric:         Behavior: Behavior normal.         Thought Content: Thought content normal.         Judgment: Judgment normal.         Results for orders placed or performed in visit on 09/13/22   POCT VERITOR SARS-CoV-2 Antigen    Specimen: Nasopharynx; Swab   Result Value Ref Range    SARS Antigen Not Detected Not Detected, Presumptive Negative    Internal Control Passed Passed    Lot Number [id number]     Expiration Date 1/29/23      Result Review :                  Assessment and Plan    Diagnoses and all orders for this visit:    1. Viral illness (Primary)- covid neg.  No flu test available in clinic today today.  bp recheck 86/84.  Tylenol/motrin prn.  Otc meds for congestion as needed.  Make sure pt remains hydrated.  Discussed signs and symptoms of dehydration, respiratory distress, and when to seek care in Emergency Department.  F/u if sx not starting to improve in 2-3 days or sooner if worsening.        2. Cough  -     POCT VERITOR SARS-CoV-2 Antigen    3. Hypotension due to drugs     bp low today but pt sick, not drinking well and has already taken 1 dose of coreg, losartan and spironolactone this am.     Hold bp meds and push fluids.    - carvedilol  - losartan  -  spironolactone    Start bp meds back 1 at a time when sbp 120 or higher.  Start meds in this order  1- carvedilol  2- losartan  3- spironolactone          Outpatient Medications Prior to Visit   Medication Sig Dispense Refill   • aspirin 81 MG chewable tablet Chew 1 tablet Daily. 90 tablet 3   • atorvastatin (LIPITOR) 40 MG tablet Take 1 tablet by mouth Every Night. 90 tablet 3   • carvedilol (COREG) 25 MG tablet Take 0.5 tablets by mouth Every 12 (Twelve) Hours. 180 tablet 3   • cetirizine (zyrTEC) 10 MG tablet Take 1 tablet by mouth Daily. 90 tablet 3   • fluticasone (Flonase) 50 MCG/ACT nasal spray 2 sprays into the nostril(s) as directed by provider Daily. 48 g 3   • glucose blood test strip Use as instructed daily 100 each 1   • glucose monitor monitoring kit 1 each As Needed (daily to check glucose). 1 each 0   • Lancet Devices misc 1 each Daily As Needed (to check glucose). 100 each 1   • losartan (COZAAR) 50 MG tablet Take 1 tablet by mouth Daily. 90 tablet 3   • metFORMIN ER (Glucophage XR) 500 MG 24 hr tablet Take 2 tablets by mouth Daily With Breakfast. 180 tablet 1   • prasugrel (EFFIENT) 10 MG tablet Take 1 tablet by mouth Daily. 90 tablet 3   • spironolactone (ALDACTONE) 25 MG tablet Take 1 tablet by mouth Daily. 90 tablet 3   • nitroglycerin (NITROSTAT) 0.4 MG SL tablet 1 under the tongue as needed for angina, may repeat q5mins for up three doses 25 tablet 1     No facility-administered medications prior to visit.     No orders of the defined types were placed in this encounter.    [unfilled]  There are no discontinued medications.      Return if symptoms worsen or fail to improve.    Patient was given instructions and counseling regarding her condition or for health maintenance advice. Please see specific information pulled into the AVS if appropriate.

## 2022-09-14 ENCOUNTER — APPOINTMENT (OUTPATIENT)
Dept: CARDIAC REHAB | Facility: HOSPITAL | Age: 47
End: 2022-09-14

## 2022-09-14 RX ORDER — CALCIUM CITRATE/VITAMIN D3 200MG-6.25
TABLET ORAL
Qty: 100 EACH | Refills: 0 | Status: SHIPPED | OUTPATIENT
Start: 2022-09-14

## 2022-09-14 NOTE — TELEPHONE ENCOUNTER
Rx Refill Note  Requested Prescriptions     Pending Prescriptions Disp Refills   • True Metrix Blood Glucose Test test strip [Pharmacy Med Name: TRUE METRIX DEION GLUCOSE] 100 each 0     Sig: USE TO TEST BLOOD GLUCOSE ONCE DAILY AS DIRECTED.      Last office visit with prescribing clinician: 9/13/2022      Next office visit with prescribing clinician: Visit date not found            Mana Bell MA  09/14/22, 09:28 EDT

## 2022-09-16 ENCOUNTER — TREATMENT (OUTPATIENT)
Dept: CARDIAC REHAB | Facility: HOSPITAL | Age: 47
End: 2022-09-16

## 2022-09-16 DIAGNOSIS — Z95.5 S/P DRUG ELUTING CORONARY STENT PLACEMENT: Primary | ICD-10-CM

## 2022-09-16 LAB — GLUCOSE BLDC GLUCOMTR-MCNC: 109 MG/DL (ref 70–130)

## 2022-09-16 PROCEDURE — 93798 PHYS/QHP OP CAR RHAB W/ECG: CPT

## 2022-09-16 PROCEDURE — 82962 GLUCOSE BLOOD TEST: CPT

## 2022-09-19 ENCOUNTER — TREATMENT (OUTPATIENT)
Dept: CARDIAC REHAB | Facility: HOSPITAL | Age: 47
End: 2022-09-19

## 2022-09-19 DIAGNOSIS — Z95.5 S/P DRUG ELUTING CORONARY STENT PLACEMENT: Primary | ICD-10-CM

## 2022-09-19 LAB
GLUCOSE BLDC GLUCOMTR-MCNC: 85 MG/DL (ref 70–130)
GLUCOSE BLDC GLUCOMTR-MCNC: 89 MG/DL (ref 70–130)

## 2022-09-19 PROCEDURE — 82962 GLUCOSE BLOOD TEST: CPT

## 2022-09-19 PROCEDURE — 93798 PHYS/QHP OP CAR RHAB W/ECG: CPT

## 2022-09-21 ENCOUNTER — TREATMENT (OUTPATIENT)
Dept: CARDIAC REHAB | Facility: HOSPITAL | Age: 47
End: 2022-09-21

## 2022-09-21 DIAGNOSIS — Z95.5 S/P DRUG ELUTING CORONARY STENT PLACEMENT: Primary | ICD-10-CM

## 2022-09-21 LAB — GLUCOSE BLDC GLUCOMTR-MCNC: 104 MG/DL (ref 70–130)

## 2022-09-21 PROCEDURE — 82962 GLUCOSE BLOOD TEST: CPT

## 2022-09-21 PROCEDURE — 93798 PHYS/QHP OP CAR RHAB W/ECG: CPT

## 2022-09-23 ENCOUNTER — TREATMENT (OUTPATIENT)
Dept: CARDIAC REHAB | Facility: HOSPITAL | Age: 47
End: 2022-09-23

## 2022-09-23 DIAGNOSIS — Z95.5 S/P DRUG ELUTING CORONARY STENT PLACEMENT: Primary | ICD-10-CM

## 2022-09-23 LAB — GLUCOSE BLDC GLUCOMTR-MCNC: 130 MG/DL (ref 70–130)

## 2022-09-23 PROCEDURE — 82962 GLUCOSE BLOOD TEST: CPT

## 2022-09-23 PROCEDURE — 93798 PHYS/QHP OP CAR RHAB W/ECG: CPT

## 2022-09-26 ENCOUNTER — TREATMENT (OUTPATIENT)
Dept: CARDIAC REHAB | Facility: HOSPITAL | Age: 47
End: 2022-09-26

## 2022-09-26 DIAGNOSIS — Z95.5 S/P DRUG ELUTING CORONARY STENT PLACEMENT: Primary | ICD-10-CM

## 2022-09-26 PROCEDURE — 93798 PHYS/QHP OP CAR RHAB W/ECG: CPT

## 2022-09-27 ENCOUNTER — OFFICE VISIT (OUTPATIENT)
Dept: CARDIOLOGY | Facility: CLINIC | Age: 47
End: 2022-09-27

## 2022-09-27 VITALS
HEIGHT: 73 IN | HEART RATE: 62 BPM | BODY MASS INDEX: 29.87 KG/M2 | WEIGHT: 225.41 LBS | SYSTOLIC BLOOD PRESSURE: 122 MMHG | DIASTOLIC BLOOD PRESSURE: 88 MMHG

## 2022-09-27 DIAGNOSIS — E78.2 MIXED HYPERLIPIDEMIA: ICD-10-CM

## 2022-09-27 DIAGNOSIS — I25.10 CORONARY ARTERY DISEASE INVOLVING NATIVE CORONARY ARTERY OF NATIVE HEART WITHOUT ANGINA PECTORIS: Primary | ICD-10-CM

## 2022-09-27 PROBLEM — I21.19 INFERIOR MYOCARDIAL INFARCTION (HCC): Status: RESOLVED | Noted: 2022-08-06 | Resolved: 2022-09-27

## 2022-09-27 PROBLEM — R03.0 ELEVATED BLOOD PRESSURE READING IN OFFICE WITHOUT DIAGNOSIS OF HYPERTENSION: Status: RESOLVED | Noted: 2018-07-16 | Resolved: 2022-09-27

## 2022-09-27 PROCEDURE — 99214 OFFICE O/P EST MOD 30 MIN: CPT | Performed by: INTERNAL MEDICINE

## 2022-09-27 PROCEDURE — 93000 ELECTROCARDIOGRAM COMPLETE: CPT | Performed by: INTERNAL MEDICINE

## 2022-09-27 NOTE — PROGRESS NOTES
Date of Office Visit: 22  Encounter Provider: Jesse Agudelo MD  Place of Service: [facility]  Patient Name: Arnav Celaya  :1975    Chief Complaint   Patient presents with   • Myocardial Infarction    :     HPI:     Mr. Celaya is 47 y.o. and presents today to establish care.    He presented in 2022 with an inferior STEMI. He had an ulcerated 95% lesion in the mid right coronary artery along with distal embolization of the posterolateral branch.  Him a placement of a 4 x 18 mm Xience drug-eluting stent to the right coronary artery, postdilated to 5 mmHg. The distal vessel was treated with several rounds of intracoronary nitroglycerin, adenosine, and nitroprusside, and he was treated with eptifibatide for 24 hours to improve flow through the VIRAL.  An echo revealed normal left ventricular systolic function, ejection fraction 55%, there was inferior hypokinesis and mild to moderate mitral regurgitation.  He was discharged on aspirin, prasugrel, atorvastatin, carvedilol, losartan, and spironolactone.     He really changed his lifestyle after that and his weight and blood pressure immediately came down.  Two weeks ago, he presented to his primary care physician's office with flu-like symptoms and low blood pressure.  Carvedilol, losartan, and spironolactone were stopped. His SBP at home is consistently ~120 mm Hg.     He feels well! He has no angina, dyspnea, palpitations, lightheadedness, syncope, or edema. He's taking his aspirin, prasugrel, and atorvastatin without complication. He has bruising but no bleeding. He's graduating from cardiac rehab soon.    Past Medical History:   Diagnosis Date   • CAD (coronary artery disease) 2022    Inferior STEMI 2022: 95% mid RCA with distal embolization or rPLA, s/p 4x18mm MICHELLE post-dilated to 5mm.   • Gastroesophageal reflux disease 2019   • Hemochromatosis    • Mild intermittent asthma without  complication 06/25/2018       Past Surgical History:   Procedure Laterality Date   • CARDIAC CATHETERIZATION N/A 8/6/2022    Procedure: Left Heart Cath;  Surgeon: Donna Mendoza MD;  Location:  [facility];  Service: Cardiovascular;  Laterality: N/A;   • CARDIAC CATHETERIZATION N/A 8/6/2022    Procedure: Coronary angiography;  Surgeon: Donna Mendoza MD;  Location:  [facility];  Service: Cardiovascular;  Laterality: N/A;   • CARDIAC CATHETERIZATION N/A 8/6/2022    Procedure: Left ventriculography;  Surgeon: Donna Mendoza MD;  Location:  [facility];  Service: Cardiovascular;  Laterality: N/A;   • CARDIAC CATHETERIZATION  8/6/2022    Procedure: STENT MICHELLE - CORONARY;  Surgeon: Donna Mendoza MD;  Location:  [facility];  Service: Cardiovascular;;   • CARDIAC CATHETERIZATION N/A 8/6/2022    Procedure: Optical Coherent Tomography;  Surgeon: Donna Mendoza MD;  Location:  [facility];  Service: Cardiovascular;  Laterality: N/A;   • CARDIAC CATHETERIZATION  8/6/2022    Procedure: Percutaneous Manual Thrombectomy;  Surgeon: Donna Mendoza MD;  Location:  [facility];  Service: Cardiovascular;;   • CARDIAC CATHETERIZATION N/A 8/6/2022    Procedure: Percutaneous Coronary Intervention;  Surgeon: Donna Mendoza MD;  Location:  [facility];  Service: Cardiovascular;  Laterality: N/A;       Social History     Socioeconomic History   • Marital status:    Tobacco Use   • Smoking status: Former Smoker     Packs/day: 0.25     Years: 10.00     Pack years: 2.50     Types: Cigarettes     Quit date: 1/1/2019     Years since quitting: 3.7   • Smokeless tobacco: Never Used   • Tobacco comment: Was on and off smoking until around 2019   Vaping Use   • Vaping Use: Former   • Substances: Nicotine   • Devices: Disposable   Substance and Sexual Activity   • Alcohol use: Yes     Alcohol/week: 1.0 standard  "drink     Types: 1 Cans of beer per week     Comment: socially   • Drug use: No   • Sexual activity: Defer       Family History   Problem Relation Age of Onset   • Heart attack Paternal Aunt    • Heart attack Paternal Grandfather        Review of Systems   All other systems reviewed and are negative.      Allergies   Allergen Reactions   • Lisinopril Cough   • Omeprazole Other (See Comments)     Poss rash         Current Outpatient Medications:   •  aspirin 81 MG chewable tablet, Chew 1 tablet Daily., Disp: 90 tablet, Rfl: 3  •  atorvastatin (LIPITOR) 40 MG tablet, Take 1 tablet by mouth Every Night., Disp: 90 tablet, Rfl: 3  •  cetirizine (zyrTEC) 10 MG tablet, Take 1 tablet by mouth Daily., Disp: 90 tablet, Rfl: 3  •  fluticasone (Flonase) 50 MCG/ACT nasal spray, 2 sprays into the nostril(s) as directed by provider Daily., Disp: 48 g, Rfl: 3  •  glucose monitor monitoring kit, 1 each As Needed (daily to check glucose)., Disp: 1 each, Rfl: 0  •  Lancet Devices misc, 1 each Daily As Needed (to check glucose)., Disp: 100 each, Rfl: 1  •  metFORMIN ER (Glucophage XR) 500 MG 24 hr tablet, Take 2 tablets by mouth Daily With Breakfast., Disp: 180 tablet, Rfl: 1  •  nitroglycerin (NITROSTAT) 0.4 MG SL tablet, 1 under the tongue as needed for angina, may repeat q5mins for up three doses, Disp: 25 tablet, Rfl: 1  •  prasugrel (EFFIENT) 10 MG tablet, Take 1 tablet by mouth Daily., Disp: 90 tablet, Rfl: 3  •  True Metrix Blood Glucose Test test strip, USE TO TEST BLOOD GLUCOSE ONCE DAILY AS DIRECTED., Disp: 100 each, Rfl: 0      Objective:     Vitals:    09/27/22 1314   BP: 122/88   BP Location: Left arm   Pulse: 62   Weight: 102 kg (225 lb 6.6 oz)   Height: 185.4 cm (73\")     Body mass index is 29.74 kg/m².    Vitals reviewed.   Constitutional:       Appearance: Healthy appearance. Well-developed and not in distress.   Eyes:      Conjunctiva/sclera: Conjunctivae normal.   HENT:      Head: Normocephalic.      Nose: Nose " normal.         Comments: + plaque build up on teeth  Neck:      Vascular: No JVD. JVD normal.      Lymphadenopathy: No cervical adenopathy.   Pulmonary:      Effort: Pulmonary effort is normal.      Breath sounds: Normal breath sounds.   Cardiovascular:      Normal rate. Regular rhythm.      Murmurs: There is no murmur.   Pulses:     Intact distal pulses.   Edema:     Peripheral edema absent.   Abdominal:      Palpations: Abdomen is soft.      Tenderness: There is no abdominal tenderness.   Musculoskeletal: Normal range of motion.      Cervical back: Normal range of motion. Skin:     General: Skin is warm and dry.      Findings: No rash.   Neurological:      General: No focal deficit present.      Mental Status: Alert, oriented to person, place, and time and oriented to person, place and time.      Cranial Nerves: No cranial nerve deficit.   Psychiatric:         Behavior: Behavior normal.         Thought Content: Thought content normal.         Judgment: Judgment normal.           ECG 12 Lead    Date/Time: 9/27/2022 1:26 PM  Performed by: Jesse Agudelo MD  Authorized by: Jesse Agudelo MD   Comparison: compared with previous ECG   Comparison to previous ECG: TWI is less pronounced  Rhythm: sinus rhythm  Q waves: II, aVF, III, V6 and V5    ST Segments: ST segments normal  T inversion: II, III, aVF, V6 and V5  QRS axis: normal  Other: no other findings    Clinical impression: abnormal EKG              Assessment:       Diagnosis Plan   1. Coronary artery disease involving native coronary artery of native heart without angina pectoris     2. Mixed hyperlipidemia            Plan:       Mr Celaya had a STEMI in August 2022 due to plaque rupture in the RCA. He's doing great on aspirin, prasugrel, and atorvastatin.  An echo revealed normal ventricular systolic function with inferolateral hypokinesis.  Unfortunately, he did not tolerate carvedilol, losartan, or spironolactone due to low blood pressure.  His blood  pressure is presently perfect.  He has no worrisome cardiac symptoms, and he is about to graduate from cardiac rehab.    Will remain on dual antiplatelet therapy for 12 months and then he will be switched to clopidogrel for monotherapy.  If his blood pressure creeps up, he will resume low-dose carvedilol.  I did clear him to resume sexual activity today.  He inquired about riding roller coasters in November, and I think that might be too soon.     I did encourage him to visit the dentist for a dental cleaning as he does have some evidence of gingivitis, and this creates chronic formation that increase the risk of blood pressure.    Sincerely,       Jesse Agudelo MD

## 2022-09-28 ENCOUNTER — APPOINTMENT (OUTPATIENT)
Dept: CARDIAC REHAB | Facility: HOSPITAL | Age: 47
End: 2022-09-28

## 2022-09-30 ENCOUNTER — APPOINTMENT (OUTPATIENT)
Dept: CARDIAC REHAB | Facility: HOSPITAL | Age: 47
End: 2022-09-30

## 2022-10-03 ENCOUNTER — TREATMENT (OUTPATIENT)
Dept: CARDIAC REHAB | Facility: HOSPITAL | Age: 47
End: 2022-10-03

## 2022-10-03 DIAGNOSIS — Z95.5 S/P DRUG ELUTING CORONARY STENT PLACEMENT: Primary | ICD-10-CM

## 2022-10-03 PROCEDURE — 93798 PHYS/QHP OP CAR RHAB W/ECG: CPT

## 2022-10-05 ENCOUNTER — APPOINTMENT (OUTPATIENT)
Dept: CARDIAC REHAB | Facility: HOSPITAL | Age: 47
End: 2022-10-05

## 2022-10-07 ENCOUNTER — APPOINTMENT (OUTPATIENT)
Dept: CARDIAC REHAB | Facility: HOSPITAL | Age: 47
End: 2022-10-07

## 2022-10-10 ENCOUNTER — TREATMENT (OUTPATIENT)
Dept: CARDIAC REHAB | Facility: HOSPITAL | Age: 47
End: 2022-10-10

## 2022-10-10 DIAGNOSIS — Z95.5 S/P DRUG ELUTING CORONARY STENT PLACEMENT: Primary | ICD-10-CM

## 2022-10-10 PROCEDURE — 93798 PHYS/QHP OP CAR RHAB W/ECG: CPT

## 2022-10-11 ENCOUNTER — TELEPHONE (OUTPATIENT)
Dept: CARDIOLOGY | Facility: CLINIC | Age: 47
End: 2022-10-11

## 2022-10-11 NOTE — TELEPHONE ENCOUNTER
Kristine with cardiac rehab called to report a more pronounced bbb during pt's session.  She stated on the vm that she is sending strips for review and instruction if needed.

## 2022-10-12 ENCOUNTER — APPOINTMENT (OUTPATIENT)
Dept: CARDIAC REHAB | Facility: HOSPITAL | Age: 47
End: 2022-10-12

## 2022-10-14 ENCOUNTER — APPOINTMENT (OUTPATIENT)
Dept: CARDIAC REHAB | Facility: HOSPITAL | Age: 47
End: 2022-10-14

## 2022-10-14 NOTE — TELEPHONE ENCOUNTER
Pt is scheduled for a follow up appt with Chris on 10/20.  Per [facility] pt can resume his cardiac rehab activities as long as he is not having any symptoms.

## 2022-10-17 ENCOUNTER — TREATMENT (OUTPATIENT)
Dept: CARDIAC REHAB | Facility: HOSPITAL | Age: 47
End: 2022-10-17

## 2022-10-17 DIAGNOSIS — Z95.5 S/P DRUG ELUTING CORONARY STENT PLACEMENT: Primary | ICD-10-CM

## 2022-10-17 PROCEDURE — 93798 PHYS/QHP OP CAR RHAB W/ECG: CPT

## 2022-10-19 ENCOUNTER — APPOINTMENT (OUTPATIENT)
Dept: CARDIAC REHAB | Facility: HOSPITAL | Age: 47
End: 2022-10-19

## 2022-10-20 ENCOUNTER — OFFICE VISIT (OUTPATIENT)
Dept: CARDIOLOGY | Facility: CLINIC | Age: 47
End: 2022-10-20

## 2022-10-20 VITALS
HEIGHT: 73 IN | DIASTOLIC BLOOD PRESSURE: 80 MMHG | RESPIRATION RATE: 16 BRPM | WEIGHT: 223.4 LBS | SYSTOLIC BLOOD PRESSURE: 112 MMHG | OXYGEN SATURATION: 97 % | HEART RATE: 68 BPM | BODY MASS INDEX: 29.61 KG/M2

## 2022-10-20 DIAGNOSIS — I10 ESSENTIAL HYPERTENSION: Primary | ICD-10-CM

## 2022-10-20 DIAGNOSIS — I25.10 CORONARY ARTERY DISEASE INVOLVING NATIVE CORONARY ARTERY OF NATIVE HEART WITHOUT ANGINA PECTORIS: ICD-10-CM

## 2022-10-20 PROCEDURE — 93000 ELECTROCARDIOGRAM COMPLETE: CPT | Performed by: NURSE PRACTITIONER

## 2022-10-20 PROCEDURE — 99214 OFFICE O/P EST MOD 30 MIN: CPT | Performed by: NURSE PRACTITIONER

## 2022-10-20 NOTE — PROGRESS NOTES
Date of Office Visit: 10/20/2022  Encounter Provider: EUGENIO Mattson  Place of Service: [facility] CARDIOLOGY  Patient Name: Arnav Celaya  :1975  Primary Cardiologist: Dr. Agudelo    C:  Isael ALBRECHT     Dear Dr. Weber    HPI: Arnav Celaya is a pleasant 47 y.o. male who presents 10/20/2022 for cardiac follow up.  I reviewed his past medical records including notes, labs and testing in preparation for today's visit.    He presented in 2022 with an inferior STEMI. He had an ulcerated 95% lesion in the mid right coronary artery along with distal embolization of the posterolateral branch.  Him a placement of a 4 x 18 mm Xience drug-eluting stent to the right coronary artery, postdilated to 5 mmHg. The distal vessel was treated with several rounds of intracoronary nitroglycerin, adenosine, and nitroprusside, and he was treated with eptifibatide for 24 hours to improve flow through the VIRAL.  An echo revealed normal left ventricular systolic function, ejection fraction 55%, there was inferior hypokinesis and mild to moderate mitral regurgitation.  He was discharged on aspirin, prasugrel, atorvastatin, carvedilol, losartan, and spironolactone.      Dr. Pascal saw him 2022.  He really changed his lifestyle after that and his weight and blood pressure immediately came down.  Two weeks ago, he presented to his primary care physician's office with flu-like symptoms and low blood pressure.  Carvedilol, losartan, and spironolactone were stopped. His SBP at home is consistently ~120 mm Hg.  When he saw Dr. Agudelo in September, he was feeling well and had no angina.  He was taking his medications without complication.  Attending cardiac rehab and was to graduate soon.  She noted he is to remain on dual antiplatelet therapy for 12 months and then will be switched to clopidogrel monotherapy.     Returns today with concern for new bundle branch block.  This was seen on telemetry  during one of his cardiac rehab visits.  Today's EKG does not show any bundle branch block.  He is doing well.  He denies any palpitations, shortness of breath, lower extremity edema.  He denies any dizziness, lightheadedness, syncope or presyncopal episodes.  He denies any chest pain, pressure or fatigue.  His blood pressure is well controlled.  He has 2 more weeks of cardiac rehab.  He has committed himself to lifestyle changes as he is doing well.       Past Medical History:   Diagnosis Date   • CAD (coronary artery disease) 9/27/2022    Inferior STEMI 8/2022: 95% mid RCA with distal embolization or rPLA, s/p 4x18mm MICHELLE post-dilated to 5mm.   • Gastroesophageal reflux disease 03/06/2019   • Hemochromatosis    • Mild intermittent asthma without complication 06/25/2018       Past Surgical History:   Procedure Laterality Date   • CARDIAC CATHETERIZATION N/A 8/6/2022    Procedure: Left Heart Cath;  Surgeon: Donna Mendoza MD;  Location: [facility] CATH INVASIVE LOCATION;  Service: Cardiovascular;  Laterality: N/A;   • CARDIAC CATHETERIZATION N/A 8/6/2022    Procedure: Coronary angiography;  Surgeon: Donna Mendoza MD;  Location: [facility] CATH INVASIVE LOCATION;  Service: Cardiovascular;  Laterality: N/A;   • CARDIAC CATHETERIZATION N/A 8/6/2022    Procedure: Left ventriculography;  Surgeon: Donna Mendoza MD;  Location: [facility] CATH INVASIVE LOCATION;  Service: Cardiovascular;  Laterality: N/A;   • CARDIAC CATHETERIZATION  8/6/2022    Procedure: STENT MICHELLE - CORONARY;  Surgeon: Donna Mendoza MD;  Location: [facility] CATH INVASIVE LOCATION;  Service: Cardiovascular;;   • CARDIAC CATHETERIZATION N/A 8/6/2022    Procedure: Optical Coherent Tomography;  Surgeon: Donna Mendoza MD;  Location: [facility] CATH INVASIVE LOCATION;  Service: Cardiovascular;  Laterality: N/A;   • CARDIAC CATHETERIZATION  8/6/2022    Procedure: Percutaneous Manual Thrombectomy;  Surgeon: Donna Mendoza MD;  Location: [facility] CATH INVASIVE LOCATION;   Service: Cardiovascular;;   • CARDIAC CATHETERIZATION N/A 8/6/2022    Procedure: Percutaneous Coronary Intervention;  Surgeon: Donna Mendoza MD;  Location: [facility] INVASIVE LOCATION;  Service: Cardiovascular;  Laterality: N/A;       Social History     Socioeconomic History   • Marital status:    Tobacco Use   • Smoking status: Former     Packs/day: 0.25     Years: 10.00     Pack years: 2.50     Types: Cigarettes     Quit date: 1/1/2019     Years since quitting: 3.8   • Smokeless tobacco: Never   • Tobacco comments:     Was on and off smoking until around 2019   Vaping Use   • Vaping Use: Former   • Substances: Nicotine   • Devices: Disposable   Substance and Sexual Activity   • Alcohol use: Yes     Alcohol/week: 1.0 standard drink     Types: 1 Cans of beer per week     Comment: socially   • Drug use: No   • Sexual activity: Defer       Family History   Problem Relation Age of Onset   • Heart attack Paternal Aunt    • Heart attack Paternal Grandfather        The following portion of the patient's history were reviewed and updated as appropriate: past medical history, past surgical history, past social history, past family history, allergies, current medications, and problem list.    Review of Systems   Constitutional: Negative for diaphoresis, fever and malaise/fatigue.   HENT: Negative for congestion, hearing loss, hoarse voice, nosebleeds and sore throat.    Eyes: Negative for photophobia, vision loss in left eye, vision loss in right eye and visual disturbance.   Cardiovascular: Negative for chest pain, dyspnea on exertion, irregular heartbeat, leg swelling, near-syncope, orthopnea, palpitations, paroxysmal nocturnal dyspnea and syncope.   Respiratory: Negative for cough, hemoptysis, shortness of breath, sleep disturbances due to breathing, snoring, sputum production and wheezing.    Endocrine: Negative for cold intolerance, heat intolerance, polydipsia, polyphagia and polyuria.    Hematologic/Lymphatic: Negative for bleeding problem. Does not bruise/bleed easily.   Skin: Negative for color change, dry skin, poor wound healing, rash and suspicious lesions.   Musculoskeletal: Negative for arthritis, back pain, falls, gout, joint pain, joint swelling, muscle cramps, muscle weakness and myalgias.   Gastrointestinal: Negative for bloating, abdominal pain, constipation, diarrhea, dysphagia, melena, nausea and vomiting.   Neurological: Negative for excessive daytime sleepiness, dizziness, headaches, light-headedness, loss of balance, numbness, paresthesias, seizures, vertigo and weakness.   Psychiatric/Behavioral: Negative for depression, memory loss and substance abuse. The patient is not nervous/anxious.        Allergies   Allergen Reactions   • Lisinopril Cough   • Omeprazole Other (See Comments)     Poss rash         Current Outpatient Medications:   •  aspirin 81 MG chewable tablet, Chew 1 tablet Daily., Disp: 90 tablet, Rfl: 3  •  atorvastatin (LIPITOR) 40 MG tablet, Take 1 tablet by mouth Every Night., Disp: 90 tablet, Rfl: 3  •  cetirizine (zyrTEC) 10 MG tablet, Take 1 tablet by mouth Daily., Disp: 90 tablet, Rfl: 3  •  fluticasone (Flonase) 50 MCG/ACT nasal spray, 2 sprays into the nostril(s) as directed by provider Daily., Disp: 48 g, Rfl: 3  •  glucose monitor monitoring kit, 1 each As Needed (daily to check glucose)., Disp: 1 each, Rfl: 0  •  Lancet Devices misc, 1 each Daily As Needed (to check glucose)., Disp: 100 each, Rfl: 1  •  metFORMIN ER (Glucophage XR) 500 MG 24 hr tablet, Take 2 tablets by mouth Daily With Breakfast., Disp: 180 tablet, Rfl: 1  •  nitroglycerin (NITROSTAT) 0.4 MG SL tablet, 1 under the tongue as needed for angina, may repeat q5mins for up three doses, Disp: 25 tablet, Rfl: 1  •  prasugrel (EFFIENT) 10 MG tablet, Take 1 tablet by mouth Daily., Disp: 90 tablet, Rfl: 3  •  True Metrix Blood Glucose Test test strip, USE TO TEST BLOOD GLUCOSE ONCE DAILY AS  "DIRECTED., Disp: 100 each, Rfl: 0        Objective:     Vitals:    10/20/22 1122   BP: 112/80   BP Location: Right arm   Patient Position: Sitting   Cuff Size: Adult   Pulse: 68   Resp: 16   SpO2: 97%   Weight: 101 kg (223 lb 6.4 oz)   Height: 185.4 cm (73\")     Body mass index is 29.47 kg/m².      Vitals reviewed.   Constitutional:       General: Not in acute distress.     Appearance: Healthy appearance. Well-developed.   Eyes:      General:         Right eye: No discharge.         Left eye: No discharge.      Conjunctiva/sclera: Conjunctivae normal.   HENT:      Head: Normocephalic and atraumatic.      Right Ear: External ear normal.      Left Ear: External ear normal.      Nose: Nose normal.   Neck:      Thyroid: No thyromegaly.      Vascular: No JVD.      Trachea: No tracheal deviation.      Lymphadenopathy: No cervical adenopathy.   Pulmonary:      Effort: Pulmonary effort is normal. No respiratory distress.      Breath sounds: Normal breath sounds. No wheezing. No rales.   Chest:      Chest wall: Not tender to palpatation.   Cardiovascular:      Normal rate. Regular rhythm.      No gallop.   Pulses:     Intact distal pulses.   Edema:     Peripheral edema absent.   Abdominal:      General: There is no distension.      Palpations: Abdomen is soft.      Tenderness: There is no abdominal tenderness.   Musculoskeletal: Normal range of motion.         General: No tenderness or deformity.      Cervical back: Normal range of motion and neck supple. Skin:     General: Skin is warm and dry.      Findings: No erythema or rash.   Neurological:      Mental Status: Alert and oriented to person, place, and time.      Coordination: Coordination normal.   Psychiatric:         Attention and Perception: Attention normal.         Behavior: Behavior normal.         Thought Content: Thought content normal.         Judgment: Judgment normal.               ECG 12 Lead    Date/Time: 10/20/2022 11:25 AM  Performed by: Glenis Bradley, " EUGENIO  Authorized by: Glenis Bradley APRN   Comparison: compared with previous ECG from 9/27/2022  Similar to previous ECG  Rhythm: sinus rhythm  Rate: normal  Conduction: conduction normal  Q waves: II, III and aVF    ST Segments: ST segments normal  T elevation: V2 and V3  T inversion: II, III and aVF  QRS axis: right    Clinical impression: abnormal EKG              Assessment:       Diagnosis Plan   1. Essential hypertension        2. Coronary artery disease involving native coronary artery of native heart without angina pectoris               Plan:       1/2. Inferior STelevation MI/ CAD -  RCA s/p Stent and PTCA of distal embolization of PLB.  He is on DAPT.  He has been attending cardiac rehab and has 2 more weeks.  He feels well.  3. Ischemic Cardiomyopathy improved on echo - He is on GDMT.    He is no longer on beta-blocker as his blood pressure would not sustain.  He did have a echo after his revascularization that showed an EF of 55%.  4. Hypertension -well-controlled  5. Hyperlipidemia -continue on lipid-lowering therapy.  Currently on atorvastatin 40.  6. Diabetes Mellitus type 2    Doing well.  RTO in 6 months with JK    As always, it has been a pleasure to participate in your patient's care. Thank you.       Sincerely,       EUGENIO Mattson      Current Outpatient Medications:   •  aspirin 81 MG chewable tablet, Chew 1 tablet Daily., Disp: 90 tablet, Rfl: 3  •  atorvastatin (LIPITOR) 40 MG tablet, Take 1 tablet by mouth Every Night., Disp: 90 tablet, Rfl: 3  •  cetirizine (zyrTEC) 10 MG tablet, Take 1 tablet by mouth Daily., Disp: 90 tablet, Rfl: 3  •  fluticasone (Flonase) 50 MCG/ACT nasal spray, 2 sprays into the nostril(s) as directed by provider Daily., Disp: 48 g, Rfl: 3  •  glucose monitor monitoring kit, 1 each As Needed (daily to check glucose)., Disp: 1 each, Rfl: 0  •  Lancet Devices misc, 1 each Daily As Needed (to check glucose)., Disp: 100 each, Rfl: 1  •  metFORMIN ER (Glucophage XR) 500  MG 24 hr tablet, Take 2 tablets by mouth Daily With Breakfast., Disp: 180 tablet, Rfl: 1  •  nitroglycerin (NITROSTAT) 0.4 MG SL tablet, 1 under the tongue as needed for angina, may repeat q5mins for up three doses, Disp: 25 tablet, Rfl: 1  •  prasugrel (EFFIENT) 10 MG tablet, Take 1 tablet by mouth Daily., Disp: 90 tablet, Rfl: 3  •  True Metrix Blood Glucose Test test strip, USE TO TEST BLOOD GLUCOSE ONCE DAILY AS DIRECTED., Disp: 100 each, Rfl: 0      Dictated utilizing Dragon dictation

## 2022-10-21 ENCOUNTER — APPOINTMENT (OUTPATIENT)
Dept: CARDIAC REHAB | Facility: HOSPITAL | Age: 47
End: 2022-10-21

## 2022-10-24 ENCOUNTER — APPOINTMENT (OUTPATIENT)
Dept: CARDIAC REHAB | Facility: HOSPITAL | Age: 47
End: 2022-10-24

## 2022-10-24 ENCOUNTER — TREATMENT (OUTPATIENT)
Dept: CARDIAC REHAB | Facility: HOSPITAL | Age: 47
End: 2022-10-24

## 2022-10-24 DIAGNOSIS — Z95.5 S/P DRUG ELUTING CORONARY STENT PLACEMENT: Primary | ICD-10-CM

## 2022-10-24 PROCEDURE — 93798 PHYS/QHP OP CAR RHAB W/ECG: CPT

## 2022-10-26 ENCOUNTER — APPOINTMENT (OUTPATIENT)
Dept: CARDIAC REHAB | Facility: HOSPITAL | Age: 47
End: 2022-10-26

## 2022-10-28 ENCOUNTER — APPOINTMENT (OUTPATIENT)
Dept: CARDIAC REHAB | Facility: HOSPITAL | Age: 47
End: 2022-10-28

## 2022-10-31 ENCOUNTER — APPOINTMENT (OUTPATIENT)
Dept: CARDIAC REHAB | Facility: HOSPITAL | Age: 47
End: 2022-10-31

## 2022-11-02 ENCOUNTER — APPOINTMENT (OUTPATIENT)
Dept: CARDIAC REHAB | Facility: HOSPITAL | Age: 47
End: 2022-11-02

## 2022-11-04 ENCOUNTER — APPOINTMENT (OUTPATIENT)
Dept: CARDIAC REHAB | Facility: HOSPITAL | Age: 47
End: 2022-11-04

## 2022-11-07 ENCOUNTER — APPOINTMENT (OUTPATIENT)
Dept: CARDIAC REHAB | Facility: HOSPITAL | Age: 47
End: 2022-11-07

## 2022-11-09 ENCOUNTER — APPOINTMENT (OUTPATIENT)
Dept: CARDIAC REHAB | Facility: HOSPITAL | Age: 47
End: 2022-11-09

## 2022-11-11 ENCOUNTER — APPOINTMENT (OUTPATIENT)
Dept: CARDIAC REHAB | Facility: HOSPITAL | Age: 47
End: 2022-11-11

## 2022-11-14 ENCOUNTER — APPOINTMENT (OUTPATIENT)
Dept: CARDIAC REHAB | Facility: HOSPITAL | Age: 47
End: 2022-11-14

## 2022-11-16 ENCOUNTER — APPOINTMENT (OUTPATIENT)
Dept: CARDIAC REHAB | Facility: HOSPITAL | Age: 47
End: 2022-11-16

## 2022-11-18 ENCOUNTER — APPOINTMENT (OUTPATIENT)
Dept: CARDIAC REHAB | Facility: HOSPITAL | Age: 47
End: 2022-11-18

## 2022-11-21 ENCOUNTER — APPOINTMENT (OUTPATIENT)
Dept: CARDIAC REHAB | Facility: HOSPITAL | Age: 47
End: 2022-11-21

## 2022-11-23 ENCOUNTER — APPOINTMENT (OUTPATIENT)
Dept: CARDIAC REHAB | Facility: HOSPITAL | Age: 47
End: 2022-11-23

## 2023-02-13 DIAGNOSIS — E11.9 TYPE 2 DIABETES MELLITUS WITHOUT COMPLICATION, WITHOUT LONG-TERM CURRENT USE OF INSULIN: ICD-10-CM

## 2023-02-13 NOTE — TELEPHONE ENCOUNTER
Caller: Arnav Celaya    Relationship: Self    Best call back number: [phone]    Requested Prescriptions:   Requested Prescriptions     Pending Prescriptions Disp Refills   • metFORMIN ER (Glucophage XR) 500 MG 24 hr tablet 180 tablet 1     Sig: Take 2 tablets by mouth Daily With Breakfast.        Pharmacy where request should be sent: [id number] [address].  [phone]  - [phone] FX     Additional details provided by patient: PATIENT IS OUT OF THIS MEDICATION.     Does the patient have less than a 3 day supply:  [x] Yes  [] No    Would you like a call back once the refill request has been completed: [] Yes [x] No    If the office needs to give you a call back, can they leave a voicemail: [] Yes [x] No    Nolvia Shin Rep   02/13/23 11:44 EST

## 2023-02-14 ENCOUNTER — TELEPHONE (OUTPATIENT)
Dept: INTERNAL MEDICINE | Facility: CLINIC | Age: 48
End: 2023-02-14
Payer: COMMERCIAL

## 2023-02-14 ENCOUNTER — HOSPITAL ENCOUNTER (EMERGENCY)
Facility: HOSPITAL | Age: 48
Discharge: HOME | End: 2023-02-14
Attending: EMERGENCY MEDICINE | Admitting: EMERGENCY MEDICINE
Payer: COMMERCIAL

## 2023-02-14 ENCOUNTER — TELEPHONE (OUTPATIENT)
Dept: CARDIOLOGY | Facility: CLINIC | Age: 48
End: 2023-02-14
Payer: COMMERCIAL

## 2023-02-14 ENCOUNTER — TELEPHONE (OUTPATIENT)
Dept: INTERNAL MEDICINE | Facility: CLINIC | Age: 48
End: 2023-02-14

## 2023-02-14 VITALS
SYSTOLIC BLOOD PRESSURE: 130 MMHG | OXYGEN SATURATION: 98 % | BODY MASS INDEX: 30.48 KG/M2 | TEMPERATURE: 99 F | WEIGHT: 230 LBS | RESPIRATION RATE: 14 BRPM | DIASTOLIC BLOOD PRESSURE: 92 MMHG | HEART RATE: 62 BPM | HEIGHT: 73 IN

## 2023-02-14 DIAGNOSIS — R03.0 ELEVATED BLOOD PRESSURE READING: Primary | ICD-10-CM

## 2023-02-14 LAB — QT INTERVAL: 424 MS

## 2023-02-14 PROCEDURE — 99283 EMERGENCY DEPT VISIT LOW MDM: CPT

## 2023-02-14 PROCEDURE — 93005 ELECTROCARDIOGRAM TRACING: CPT | Performed by: EMERGENCY MEDICINE

## 2023-02-14 PROCEDURE — 93010 ELECTROCARDIOGRAM REPORT: CPT | Performed by: INTERNAL MEDICINE

## 2023-02-14 RX ORDER — METFORMIN HYDROCHLORIDE 500 MG/1
1000 TABLET, EXTENDED RELEASE ORAL
Qty: 180 TABLET | Refills: 0 | Status: SHIPPED | OUTPATIENT
Start: 2023-02-14 | End: 2023-03-03 | Stop reason: SDUPTHER

## 2023-02-14 RX ORDER — CARVEDILOL 12.5 MG/1
12.5 TABLET ORAL 2 TIMES DAILY WITH MEALS
COMMUNITY
End: 2023-03-03 | Stop reason: SDUPTHER

## 2023-02-14 NOTE — TELEPHONE ENCOUNTER
Okay for hub to read, Yes, [facility] but [facility] has to be [facility] by [facility] it is at their [facility]. Please have him bring labs into appointment to review with PCP.

## 2023-02-14 NOTE — ED PROVIDER NOTES
Subjective   History of Present Illness  History of Present Illness    Chief complaint: Elevated blood pressure    Location: Work    Quality/Severity: 180/110    Timing/Onset/Duration: Just prior to arrival    Modifying Factors: Improved with taking his blood pressure meds    Associated Symptoms: No headache.  No fever chills or cough.  No sore throat earache or nasal congestion.  No chest pain or shortness of breath.  No abdominal pain.  No nausea or vomiting.  No diaphoresis    Narrative: This 47-year-old white male with a history of coronary artery disease and hypertension and diabetes, presents with elevated blood pressure today.  The patient took his blood pressure medicine in the late, his blood sugars were also elevated today secondary to dietary noncompliance.    PCP:Lynn Weber MD  Review of Systems   Constitutional: Negative for chills and fever.   HENT: Negative for congestion, ear pain and sore throat.    Gastrointestinal: Negative for abdominal pain, nausea and vomiting.   Genitourinary: Negative for dysuria.   Musculoskeletal: Positive for back pain (Chronic, no worse than usual). Negative for neck pain.   Skin: Negative for rash.   Neurological: Negative for weakness, numbness and headaches.   Psychiatric/Behavioral: Negative for confusion.        Medication List      CONTINUE taking these medications    glucose monitor monitoring kit  1 each As Needed (daily to check glucose).     Lancet Devices misc  1 each Daily As Needed (to check glucose).        ASK your doctor about these medications    aspirin 81 MG chewable tablet  Chew 1 tablet Daily.     atorvastatin 40 MG tablet  Commonly known as: LIPITOR  Take 1 tablet by mouth Every Night.     carvedilol 12.5 MG tablet  Commonly known as: COREG     cetirizine 10 MG tablet  Commonly known as: zyrTEC  Take 1 tablet by mouth Daily.     fluticasone 50 MCG/ACT nasal spray  Commonly known as: Flonase  2 sprays into the nostril(s) as directed by  provider Daily.     metFORMIN  MG 24 hr tablet  Commonly known as: Glucophage XR  Take 2 tablets by mouth Daily With Breakfast.     nitroglycerin 0.4 MG SL tablet  Commonly known as: NITROSTAT  1 under the tongue as needed for angina, may repeat q5mins for up three doses     prasugrel 10 MG tablet  Commonly known as: EFFIENT  Take 1 tablet by mouth Daily.     True Metrix Blood Glucose Test test strip  Generic drug: glucose blood  USE TO TEST BLOOD GLUCOSE ONCE DAILY AS DIRECTED.            Past Medical History:   Diagnosis Date   • CAD (coronary artery disease) 9/27/2022    Inferior STEMI 8/2022: 95% mid RCA with distal embolization or rPLA, s/p 4x18mm MICHELLE post-dilated to 5mm.   • Gastroesophageal reflux disease 03/06/2019   • Hemochromatosis    • Mild intermittent asthma without complication 06/25/2018       Allergies   Allergen Reactions   • Lisinopril Cough   • Omeprazole Other (See Comments)     Poss rash       Past Surgical History:   Procedure Laterality Date   • CARDIAC CATHETERIZATION N/A 8/6/2022    Procedure: Left Heart Cath;  Surgeon: Donna Mendoza MD;  Location: [facility] CATH INVASIVE LOCATION;  Service: Cardiovascular;  Laterality: N/A;   • CARDIAC CATHETERIZATION N/A 8/6/2022    Procedure: Coronary angiography;  Surgeon: Donna Mendoza MD;  Location: [facility] CATH INVASIVE LOCATION;  Service: Cardiovascular;  Laterality: N/A;   • CARDIAC CATHETERIZATION N/A 8/6/2022    Procedure: Left ventriculography;  Surgeon: Donna Mendoza MD;  Location: [facility] CATH INVASIVE LOCATION;  Service: Cardiovascular;  Laterality: N/A;   • CARDIAC CATHETERIZATION  8/6/2022    Procedure: STENT MICHELLE - CORONARY;  Surgeon: Donna Mendoza MD;  Location: [facility] CATH INVASIVE LOCATION;  Service: Cardiovascular;;   • CARDIAC CATHETERIZATION N/A 8/6/2022    Procedure: Optical Coherent Tomography;  Surgeon: Donna Mendoza MD;  Location: [facility] CATH INVASIVE LOCATION;  Service: Cardiovascular;  Laterality: N/A;   •  CARDIAC CATHETERIZATION  2022    Procedure: Percutaneous Manual Thrombectomy;  Surgeon: Donna Mendoza MD;  Location:  [facility] CATH INVASIVE LOCATION;  Service: Cardiovascular;;   • CARDIAC CATHETERIZATION N/A 2022    Procedure: Percutaneous Coronary Intervention;  Surgeon: Donna Mendoza MD;  Location:  [facility] CATH INVASIVE LOCATION;  Service: Cardiovascular;  Laterality: N/A;       Family History   Problem Relation Age of Onset   • Heart attack Paternal Aunt    • Heart attack Paternal Grandfather        Social History     Socioeconomic History   • Marital status:    Tobacco Use   • Smoking status: Former     Packs/day: 0.25     Years: 10.00     Pack years: 2.50     Types: Cigarettes     Quit date: 2019     Years since quittin.1   • Smokeless tobacco: Never   • Tobacco comments:     Was on and off smoking until around    Vaping Use   • Vaping Use: Former   • Substances: Nicotine   • Devices: Disposable   Substance and Sexual Activity   • Alcohol use: Yes     Alcohol/week: 1.0 standard drink     Types: 1 Cans of beer per week     Comment: socially   • Drug use: No   • Sexual activity: Defer           Objective   Physical Exam  Vitals (The temperature is 99 °F, pulse 64, respirations 18, /90, room air pulse ox 97%) and nursing note reviewed.   Constitutional:       Appearance: Normal appearance.   HENT:      Head: Normocephalic.      Right Ear: Tympanic membrane normal.      Left Ear: Tympanic membrane normal.      Nose: Nose normal.      Mouth/Throat:      Mouth: Mucous membranes are moist.   Cardiovascular:      Rate and Rhythm: Regular rhythm.      Pulses: Normal pulses.      Heart sounds: Normal heart sounds. No murmur heard.    No friction rub. No gallop.   Pulmonary:      Effort: Pulmonary effort is normal.      Breath sounds: Normal breath sounds.   Abdominal:      General: Abdomen is flat. Bowel sounds are normal. There is no distension.      Palpations: Abdomen is soft.  There is no mass.      Tenderness: There is no abdominal tenderness. There is no guarding or rebound.      Hernia: No hernia is present.   Musculoskeletal:         General: No swelling or tenderness. Normal range of motion.      Cervical back: Normal range of motion and neck supple.   Skin:     General: Skin is warm and dry.      Findings: No rash.   Neurological:      General: No focal deficit present.      Mental Status: He is alert and oriented to person, place, and time.   Psychiatric:         Mood and Affect: Mood normal.         Procedures           ED Course      17:54 EST, 02/14/23:  The EKG was obtained at 1745 and read by me at 1745.  The EKG shows a normal sinus rhythm with rate of 59.  The WV is prolonged at 213 ms.  The QRS and QT intervals are unremarkable.  There is no ectopy.  There is old inferior lateral infarct.  No acute ST elevation or depression.  Compared to EKG dated 10/21/2022 the EKG today is essentially unchanged.    17:56 EST, 02/14/23:  The patient was reassessed.  He has no complaints of chest pain or shortness of breath.  He has no complaints now at all.  His vital signs were reviewed and are stable.    17:56 EST, 02/14/23:  The patient's diagnosis of hypertension was discussed with him.  He has been encouraged to follow his diet and take his meds as prescribed.  He should follow-up with Dr. Weber within 1 week.  He should take his blood pressure couple times a day and write down the value in time that he obtains these values and take these with him to Dr. Weber's office.  The patient should return to the emergency department if there is chest pain, shortness of breath, worse in any way at all.  All the patient question were answered he will be discharged in good condition                                     MDM    Final diagnoses:   None       ED Disposition  ED Disposition     None          No follow-up provider specified.       Medication List      No changes were made to your  prescriptions during this visit.          Teofilo Bowles MD  02/15/23 0017

## 2023-02-14 NOTE — DISCHARGE INSTRUCTIONS
Take your blood pressure a couple times a day and write down the value and the time that you are obtaining the pressure and take these values with you to Dr. Weber's office.  Follow-up with Dr. Weber within 1 week.  Return to the emergency department if there is chest pain, shortness of breath, worse in any way at all.

## 2023-02-14 NOTE — TELEPHONE ENCOUNTER
Caller: Arnav Celaya    Relationship to patient: Self    Best call back number: [phone]    Chief complaint: VERY HIGH BLOOD PRESSURE, VERY HIGH BLOOD SUGAR, BURNING IN CHEST, CONFUSION     Patient directed to call 911 or go to their nearest emergency room.     Patient verbalized understanding: [x] Yes  [] No  If no, why?    Additional notes: PLEASE CALL TO DISCUSS IF NEEDED.

## 2023-02-14 NOTE — TELEPHONE ENCOUNTER
Yes, but [facility] by [facility] is at [facility]. Please have him bring labs into appointment to review with PCP.

## 2023-02-14 NOTE — TELEPHONE ENCOUNTER
Called and scheduled patient for follow up. Patient would like to know if he can get [facility] the [facility]? since it is free to get them done there

## 2023-02-14 NOTE — TELEPHONE ENCOUNTER
Pt called the office to report that he forgot to take his medications this morning before he left for work.  While at work, he started to experience some heartburn, for which he took some Tums, and it improved some.  He then felt dizzy, diaphoretic, and flushed so he went to the health clinic at his work who checked his BP- 180/110.  He does not know his HR.  He does not know his baseline BP.  His glucose at this time was 259.   The clinic provided pt a Klonopin to bring down his BP.    Pt denies CP, SOA, arm pain, jaw pain, or nausea.  He does endorse some back pain.    When pt called me, he had just returned home.  I encouraged pt to go ahead and take the medications he missed from this morning so that his glucose could be controlled.    Do you have any recommendations for this patient?    Thank you,    Grace Fortune RN  [facility] Triage  02/14/23  16:12 EST

## 2023-02-14 NOTE — TELEPHONE ENCOUNTER
Rx Refill Note  Requested Prescriptions     Pending Prescriptions Disp Refills    metFORMIN ER (Glucophage XR) 500 MG 24 hr tablet 180 tablet 1     Sig: Take 2 tablets by mouth Daily With Breakfast.      Last office visit with prescribing clinician: 9/13/2022   Last telemedicine visit with prescribing clinician: Visit date not found   Next office visit with prescribing clinician: Visit date not found                         Would you like a call back once the refill request has been completed: [] Yes [] No    If the office needs to give you a call back, can they leave a voicemail: [] Yes [] No    Mana Bell MA  02/14/23, 11:20 EST

## 2023-02-20 NOTE — TELEPHONE ENCOUNTER
I scheduled pt with the next available appt with you at  on 3/7/23 and canceled the appt on 3/27/23 with JF.    Thank you,    Grace Fortune RN  [facility] [place]  02/20/23 13:44 EST

## 2023-02-28 ENCOUNTER — LAB (OUTPATIENT)
Dept: LAB | Facility: HOSPITAL | Age: 48
End: 2023-02-28
Payer: COMMERCIAL

## 2023-02-28 DIAGNOSIS — E11.9 TYPE 2 DIABETES MELLITUS WITHOUT COMPLICATION, WITHOUT LONG-TERM CURRENT USE OF INSULIN: Primary | ICD-10-CM

## 2023-02-28 LAB
ALBUMIN SERPL-MCNC: 4.2 G/DL (ref 3.5–5.2)
ALBUMIN/GLOB SERPL: 1.2 G/DL
ALP SERPL-CCNC: 60 U/L (ref 39–117)
ALT SERPL W P-5'-P-CCNC: 25 U/L (ref 1–41)
ANION GAP SERPL CALCULATED.3IONS-SCNC: 9 MMOL/L (ref 5–15)
AST SERPL-CCNC: 15 U/L (ref 1–40)
BASOPHILS # BLD AUTO: 0.05 10*3/MM3 (ref 0–0.2)
BASOPHILS NFR BLD AUTO: 0.9 % (ref 0–1.5)
BILIRUB SERPL-MCNC: 0.7 MG/DL (ref 0–1.2)
BUN SERPL-MCNC: 14 MG/DL (ref 6–20)
BUN/CREAT SERPL: 15.1 (ref 7–25)
CALCIUM SPEC-SCNC: 9.7 MG/DL (ref 8.6–10.5)
CHLORIDE SERPL-SCNC: 105 MMOL/L (ref 98–107)
CHOLEST SERPL-MCNC: 129 MG/DL (ref 0–200)
CO2 SERPL-SCNC: 26 MMOL/L (ref 22–29)
CREAT SERPL-MCNC: 0.93 MG/DL (ref 0.76–1.27)
DEPRECATED RDW RBC AUTO: 44.4 FL (ref 37–54)
EGFRCR SERPLBLD CKD-EPI 2021: 101.3 ML/MIN/1.73
EOSINOPHIL # BLD AUTO: 0.14 10*3/MM3 (ref 0–0.4)
EOSINOPHIL NFR BLD AUTO: 2.4 % (ref 0.3–6.2)
ERYTHROCYTE [DISTWIDTH] IN BLOOD BY AUTOMATED COUNT: 13 % (ref 12.3–15.4)
GLOBULIN UR ELPH-MCNC: 3.4 GM/DL
GLUCOSE SERPL-MCNC: 193 MG/DL (ref 65–99)
HBA1C MFR BLD: 6.8 % (ref 4.8–5.6)
HCT VFR BLD AUTO: 47.4 % (ref 37.5–51)
HDLC SERPL-MCNC: 31 MG/DL (ref 40–60)
HGB BLD-MCNC: 16.1 G/DL (ref 13–17.7)
IMM GRANULOCYTES # BLD AUTO: 0.01 10*3/MM3 (ref 0–0.05)
IMM GRANULOCYTES NFR BLD AUTO: 0.2 % (ref 0–0.5)
LDLC SERPL CALC-MCNC: 70 MG/DL (ref 0–100)
LDLC/HDLC SERPL: 2.13 {RATIO}
LYMPHOCYTES # BLD AUTO: 1.71 10*3/MM3 (ref 0.7–3.1)
LYMPHOCYTES NFR BLD AUTO: 29.6 % (ref 19.6–45.3)
MCH RBC QN AUTO: 31.8 PG (ref 26.6–33)
MCHC RBC AUTO-ENTMCNC: 34 G/DL (ref 31.5–35.7)
MCV RBC AUTO: 93.7 FL (ref 79–97)
MONOCYTES # BLD AUTO: 0.55 10*3/MM3 (ref 0.1–0.9)
MONOCYTES NFR BLD AUTO: 9.5 % (ref 5–12)
NEUTROPHILS NFR BLD AUTO: 3.31 10*3/MM3 (ref 1.7–7)
NEUTROPHILS NFR BLD AUTO: 57.4 % (ref 42.7–76)
NRBC BLD AUTO-RTO: 0 /100 WBC (ref 0–0.2)
PLATELET # BLD AUTO: 331 10*3/MM3 (ref 140–450)
PMV BLD AUTO: 9.5 FL (ref 6–12)
POTASSIUM SERPL-SCNC: 4.3 MMOL/L (ref 3.5–5.2)
PROT SERPL-MCNC: 7.6 G/DL (ref 6–8.5)
RBC # BLD AUTO: 5.06 10*6/MM3 (ref 4.14–5.8)
SODIUM SERPL-SCNC: 140 MMOL/L (ref 136–145)
T4 FREE SERPL-MCNC: 1.38 NG/DL (ref 0.93–1.7)
TRIGL SERPL-MCNC: 160 MG/DL (ref 0–150)
TSH SERPL DL<=0.05 MIU/L-ACNC: 2.41 UIU/ML (ref 0.27–4.2)
VLDLC SERPL-MCNC: 28 MG/DL (ref 5–40)
WBC NRBC COR # BLD: 5.77 10*3/MM3 (ref 3.4–10.8)

## 2023-02-28 PROCEDURE — 80050 GENERAL HEALTH PANEL: CPT | Performed by: INTERNAL MEDICINE

## 2023-02-28 PROCEDURE — 84439 ASSAY OF FREE THYROXINE: CPT | Performed by: INTERNAL MEDICINE

## 2023-02-28 PROCEDURE — 82570 ASSAY OF URINE CREATININE: CPT

## 2023-02-28 PROCEDURE — 83036 HEMOGLOBIN GLYCOSYLATED A1C: CPT | Performed by: INTERNAL MEDICINE

## 2023-02-28 PROCEDURE — 36415 COLL VENOUS BLD VENIPUNCTURE: CPT | Performed by: INTERNAL MEDICINE

## 2023-02-28 PROCEDURE — 80061 LIPID PANEL: CPT | Performed by: INTERNAL MEDICINE

## 2023-02-28 PROCEDURE — 82043 UR ALBUMIN QUANTITATIVE: CPT

## 2023-03-01 LAB
ALBUMIN UR-MCNC: 2.1 MG/DL
CREAT UR-MCNC: 240.5 MG/DL
MICROALBUMIN/CREAT UR: 8.7 MG/G

## 2023-03-03 ENCOUNTER — TELEMEDICINE (OUTPATIENT)
Dept: INTERNAL MEDICINE | Facility: CLINIC | Age: 48
End: 2023-03-03
Payer: COMMERCIAL

## 2023-03-03 VITALS
DIASTOLIC BLOOD PRESSURE: 85 MMHG | TEMPERATURE: 98 F | HEIGHT: 73 IN | HEART RATE: 83 BPM | SYSTOLIC BLOOD PRESSURE: 130 MMHG | OXYGEN SATURATION: 97 % | WEIGHT: 230 LBS | BODY MASS INDEX: 30.48 KG/M2

## 2023-03-03 DIAGNOSIS — I10 ESSENTIAL HYPERTENSION: ICD-10-CM

## 2023-03-03 DIAGNOSIS — E11.9 TYPE 2 DIABETES MELLITUS WITHOUT COMPLICATION, WITHOUT LONG-TERM CURRENT USE OF INSULIN: Primary | ICD-10-CM

## 2023-03-03 DIAGNOSIS — E78.2 MIXED HYPERLIPIDEMIA: ICD-10-CM

## 2023-03-03 PROCEDURE — 99214 OFFICE O/P EST MOD 30 MIN: CPT | Performed by: INTERNAL MEDICINE

## 2023-03-03 RX ORDER — METFORMIN HYDROCHLORIDE 500 MG/1
1000 TABLET, EXTENDED RELEASE ORAL
Qty: 180 TABLET | Refills: 3 | Status: SHIPPED | OUTPATIENT
Start: 2023-03-03

## 2023-03-03 RX ORDER — CARVEDILOL 12.5 MG/1
12.5 TABLET ORAL 2 TIMES DAILY WITH MEALS
Qty: 180 TABLET | Refills: 3 | Status: SHIPPED | OUTPATIENT
Start: 2023-03-03

## 2023-03-03 RX ORDER — PRASUGREL 10 MG/1
10 TABLET, FILM COATED ORAL DAILY
Qty: 90 TABLET | Refills: 3 | Status: SHIPPED | OUTPATIENT
Start: 2023-03-03

## 2023-03-03 NOTE — PROGRESS NOTES
Arnav Celaya is a 47 y.o. male, who presents with a chief complaint of   Chief Complaint   Patient presents with   • Follow-up   • Diabetes           HPI   This visit has been scheduled as a telehealth visit to comply with patient safety concerns in accordance with CDC recommendations. This was an audio and video enabled telemedicine encounter.    You have chosen to receive care through a televisit visit. Do you consent to use a televisit visit for your medical care today? Yes    Pt is located at work and I am in the office.     He has had more stress at work and he is trying to do better managing a spectrum of demands. He was in the ED a couple of weeks ago with bp very high.  He had forgotten his bp meds.  He is going to retire from the [employer] in May.    T2DM - a1c 9.0-> 7.5->7.4->6.8  He struggled with diet over the holidays but has been doing better. He has a good support system to help him. He is on metformin.  Since his heart attack he has lost quite a bit of weight.      HLD - on statin  ->70    CAD s/p MI - he is trying to be much healthier.  On effient, carvedilol, asa, and atorvastatin.     HTN - on carvedilol.  He was on losartan and spironolactone but it was stopped previously bc of low bp's.  He has a home bp cuff but isnt sure if he always gets accurate readings.     hemachromatosis - [facility].  Last hgb 16.  He had phlebotomy recently bc ferritin 104 and goal ferritin <100    The following portions of the patient's history were reviewed and updated as appropriate: allergies, current medications, past family history, past medical history, past social history, past surgical history and problem list.    Allergies: Lisinopril and Omeprazole    Review of Systems   Constitutional: Negative.    HENT: Negative.    Eyes: Negative.    Respiratory: Negative.    Cardiovascular: Negative.    Gastrointestinal: Negative.    Endocrine: Negative.    Genitourinary: Negative.    Musculoskeletal:  Negative.    Skin: Negative.    Allergic/Immunologic: Negative.    Neurological: Negative.    Hematological: Negative.    Psychiatric/Behavioral: Negative.    All other systems reviewed and are negative.            Wt Readings from Last 3 Encounters:   03/03/23 104 kg (230 lb)   02/14/23 104 kg (230 lb)   10/20/22 101 kg (223 lb 6.4 oz)     Temp Readings from Last 3 Encounters:   03/03/23 98 °F (36.7 °C)   02/14/23 99 °F (37.2 °C) (Oral)   09/13/22 97.3 °F (36.3 °C)     BP Readings from Last 3 Encounters:   03/03/23 130/85   02/14/23 130/92   10/20/22 112/80     Pulse Readings from Last 3 Encounters:   03/03/23 83   02/14/23 62   10/20/22 68     Body mass index is 30.35 kg/m².  SpO2 Readings from Last 3 Encounters:   03/03/23 97%   02/14/23 98%   10/20/22 97%          Physical Exam  Vitals and nursing note reviewed.   Constitutional:       General: He is not in acute distress.     Appearance: He is well-developed.   HENT:      Head: Normocephalic and atraumatic.      Right Ear: External ear normal.      Left Ear: External ear normal.      Nose: Nose normal.   Eyes:      Conjunctiva/sclera: Conjunctivae normal.   Pulmonary:      Effort: Pulmonary effort is normal. No respiratory distress.   Musculoskeletal:      Cervical back: Normal range of motion and neck supple.   Skin:     Findings: No rash.   Neurological:      Mental Status: He is alert and oriented to person, place, and time.   Psychiatric:         Behavior: Behavior normal.         Thought Content: Thought content normal.         Judgment: Judgment normal.         Results for orders placed or performed in visit on 02/28/23   Microalbumin / Creatinine Urine Ratio - Urine, Clean Catch    Specimen: Urine, Clean Catch   Result Value Ref Range    Microalbumin/Creatinine Ratio 8.7 mg/g    Creatinine, Urine 240.5 mg/dL    Microalbumin, Urine 2.1 mg/dL     Result Review :                  Assessment and Plan    Diagnoses and all orders for this visit:    1. Type 2  diabetes mellitus without complication, without long-term current use of insulin (HCC) (Primary) - cont metformin.  Increase exercise  -     Cancel: Microalbumin / Creatinine Urine Ratio - Urine, Clean Catch  -     CBC & Differential; Future  -     Comprehensive Metabolic Panel; Future  -     Hemoglobin A1c; Future  -     Lipid Panel With LDL / HDL Ratio; Future    2. Essential hypertension - cont carvedilol  -     CBC & Differential; Future  -     Comprehensive Metabolic Panel; Future    3. Mixed hyperlipidemia - cont lipitor  -     Comprehensive Metabolic Panel; Future  -     Lipid Panel With LDL / HDL Ratio; Future                 Outpatient Medications Prior to Visit   Medication Sig Dispense Refill   • aspirin 81 MG chewable tablet Chew 1 tablet Daily. 90 tablet 3   • atorvastatin (LIPITOR) 40 MG tablet Take 1 tablet by mouth Every Night. 90 tablet 3   • carvedilol (COREG) 12.5 MG tablet Take 1 tablet by mouth 2 (Two) Times a Day With Meals.     • glucose monitor monitoring kit 1 each As Needed (daily to check glucose). 1 each 0   • Lanc[employer] misc 1 each Daily As Needed (to check glucose). 100 each 1   • metFORMIN ER (Glucophage XR) 500 MG 24 hr tablet Take 2 tablets by mouth Daily With Breakfast. 180 tablet 0   • nitroglycerin (NITROSTAT) 0.4 MG SL tablet 1 under the tongue as needed for angina, may repeat q5mins for up three doses 25 tablet 1   • prasugrel (EFFIENT) 10 MG tablet Take 1 tablet by mouth Daily. 90 tablet 3   • True Metrix Blood Glucose Test test strip USE TO TEST BLOOD GLUCOSE ONCE DAILY AS DIRECTED. 100 each 0   • cetirizine (zyrTEC) 10 MG tablet Take 1 tablet by mouth Daily. 90 tablet 3   • fluticasone (Flonase) 50 MCG/ACT nasal spray 2 sprays into the nostril(s) as directed by provider Daily. 48 g 3     No facility-administered medications prior to visit.     No orders of the defined types were placed in this encounter.    [unfilled]  There are no discontinued  medications.      Return in about 4 months (around 7/3/2023) for Recheck, labs.    Patient was given instructions and counseling regarding her condition or for health maintenance advice. Please see specific information pulled into the AVS if appropriate.

## 2023-03-06 NOTE — PROGRESS NOTES
RM:________     PCP: Lynn Weber MD    : 1975  AGE: 47 y.o.  EST PATIENT   REASON FOR VISIT/  CC:    BP Readings from Last 3 Encounters:   23 130/85   23 130/92   10/20/22 112/80        WT: ____________ BP: __________L __________R HR______    CHEST PAIN: _____________    SOA: _____________PALPS: _______________     LIGHTHEADED: ___________FATIGUE: ________________ EDEMA __________    ALLERGIES:Lisinopril and Omeprazole SMOKING HISTORY:  Social History     Tobacco Use   • Smoking status: Former     Packs/day: 0.25     Years: 10.00     Pack years: 2.50     Types: Cigarettes     Quit date: 2019     Years since quittin.1   • Smokeless tobacco: Never   • Tobacco comments:     Was on and off smoking until around    Vaping Use   • Vaping Use: Former   • Substances: Nicotine   • Devices: Disposable   Substance Use Topics   • Alcohol use: Yes     Alcohol/week: 1.0 standard drink     Types: 1 Cans of beer per week     Comment: socially   • Drug use: No     CAFFEINE USE_________________  ALCOHOL ______________________    Below is the patient's most recent value for Albumin, ALT, AST, BUN, Calcium, Chloride, Cholesterol, CO2, Creatinine, GFR, Glucose, HDL, Hematocrit, Hemoglobin, Hemoglobin A1C, LDL, Magnesium, Phosphorus, Platelets, Potassium, PSA, Sodium, Triglycerides, TSH and WBC.   Lab Results   Component Value Date    ALBUMIN 4.2 2023    ALT 25 2023    AST 15 2023    BUN 14 2023    CALCIUM 9.7 2023     2023    CHOL 129 2023    CO2 26.0 2023    CREATININE 0.93 2023    HDL 31 (L) 2023    HCT 47.4 2023    HGB 16.1 2023    HGBA1C 6.80 (H) 2023    LDL 70 2023     2023    K 4.3 2023     2023    TRIG 160 (H) 2023    TSH 2.410 2023    WBC 5.77 2023          NEW DIAGNOSIS/ SURGERY/ HOSP OR ED VISITS:  ______________________    __________________________________________________________________      RECENT LABS OR DIAGNOSTIC TESTING:  _____________________________    __________________________________________________________________      ASSESSMENT/ PLAN: _______________________________________________    __________________________________________________________________

## 2023-03-07 ENCOUNTER — OFFICE VISIT (OUTPATIENT)
Dept: CARDIOLOGY | Facility: CLINIC | Age: 48
End: 2023-03-07
Payer: COMMERCIAL

## 2023-03-07 VITALS
BODY MASS INDEX: 30.62 KG/M2 | SYSTOLIC BLOOD PRESSURE: 110 MMHG | DIASTOLIC BLOOD PRESSURE: 76 MMHG | HEIGHT: 73 IN | WEIGHT: 231 LBS | HEART RATE: 71 BPM

## 2023-03-07 DIAGNOSIS — I10 ESSENTIAL HYPERTENSION: ICD-10-CM

## 2023-03-07 DIAGNOSIS — I25.10 CORONARY ARTERY DISEASE INVOLVING NATIVE CORONARY ARTERY OF NATIVE HEART WITHOUT ANGINA PECTORIS: Primary | ICD-10-CM

## 2023-03-07 DIAGNOSIS — E78.2 MIXED HYPERLIPIDEMIA: ICD-10-CM

## 2023-03-07 DIAGNOSIS — E11.59 TYPE 2 DIABETES MELLITUS WITH OTHER CIRCULATORY COMPLICATION, WITHOUT LONG-TERM CURRENT USE OF INSULIN: ICD-10-CM

## 2023-03-07 PROBLEM — Z48.02 ENCOUNTER FOR REMOVAL OF SUTURES: Status: RESOLVED | Noted: 2018-07-16 | Resolved: 2023-03-07

## 2023-03-07 PROBLEM — E11.9 DIABETES MELLITUS: Status: ACTIVE | Noted: 2023-03-07

## 2023-03-07 PROCEDURE — 99214 OFFICE O/P EST MOD 30 MIN: CPT | Performed by: INTERNAL MEDICINE

## 2023-03-07 NOTE — PROGRESS NOTES
Date of Office Visit: 23  Encounter Provider: Jesse Agudelo MD  Place of Service: [facility] CARDIOLOGY  Patient Name: Arnav Celaya  :1975    Chief Complaint   Patient presents with   • Coronary Artery Disease   :     HPI:     Mr. Celaya is 47 y.o. and presents today to follow up. I have reviewed prior notes and there are no changes except for any new updates described below. I have also reviewed any information entered into the medical record by the patient or by ancillary staff.     He presented in 2022 with an inferior STEMI. He had an ulcerated 95% lesion in the mid right coronary artery along with distal embolization of the posterolateral branch.  He underwent placement of a 4 x 18 mm Xience drug-eluting stent to the right coronary artery, postdilated to 5 mmHg. The distal vessel was treated with several rounds of intracoronary nitroglycerin, adenosine, and nitroprusside, and he was treated with eptifibatide for 24 hours to improve flow through the VIRAL.  An echo revealed normal left ventricular systolic function, ejection fraction 55%; there was inferior hypokinesis and mild to moderate mitral regurgitation.  He was discharged on aspirin, prasugrel, atorvastatin, carvedilol, losartan, and spironolactone.     He really changed his lifestyle after that and his weight and blood pressure immediately improved; several medication changes have been made since his MI.  He was in the ED a few weeks ago with hypertension; no changes were made.    He feels well! He has no angina, dyspnea, palpitations, lightheadedness, syncope, or edema. He's taking his aspirin, prasugrel,carvedilol, and atorvastatin without complication.     Past Medical History:   Diagnosis Date   • CAD (coronary artery disease) 2022    Inferior STEMI 2022: 95% mid RCA with distal embolization or rPLA, s/p 4x18mm MICHELLE post-dilated to 5mm.   • Diabetes mellitus (HCC) 3/7/2023   •  Gastroesophageal reflux disease 2019   • Hemochromatosis    • Mild intermittent asthma without complication 2018       Past Surgical History:   Procedure Laterality Date   • CARDIAC CATHETERIZATION N/A 2022    Procedure: Left Heart Cath;  Surgeon: Donna Mendoza MD;  Location:  [facility];  Service: Cardiovascular;  Laterality: N/A;   • CARDIAC CATHETERIZATION N/A 2022    Procedure: Coronary angiography;  Surgeon: Donna Mendoza MD;  Location:  [facility];  Service: Cardiovascular;  Laterality: N/A;   • CARDIAC CATHETERIZATION N/A 2022    Procedure: Left ventriculography;  Surgeon: Donna Mendoza MD;  Location:  [facility];  Service: Cardiovascular;  Laterality: N/A;   • CARDIAC CATHETERIZATION  2022    Procedure: STENT MICHELLE - CORONARY;  Surgeon: Donna Mendoza MD;  Location:  [facility];  Service: Cardiovascular;;   • CARDIAC CATHETERIZATION N/A 2022    Procedure: Optical Coherent Tomography;  Surgeon: Donna Mendoza MD;  Location:  [facility];  Service: Cardiovascular;  Laterality: N/A;   • CARDIAC CATHETERIZATION  2022    Procedure: Percutaneous Manual Thrombectomy;  Surgeon: Donna Mendoza MD;  Location:  [facility];  Service: Cardiovascular;;   • CARDIAC CATHETERIZATION N/A 2022    Procedure: Percutaneous Coronary Intervention;  Surgeon: Donna Mendoza MD;  Location:  [facility];  Service: Cardiovascular;  Laterality: N/A;       Social History     Socioeconomic History   • Marital status:    Tobacco Use   • Smoking status: Former     Packs/day: 0.25     Years: 10.00     Pack years: 2.50     Types: Cigarettes     Quit date: 2019     Years since quittin.1   • Smokeless tobacco: Never   • Tobacco comments:     Was on and off smoking until around 2019   Vaping Use   • Vaping Use: Former   • Substances: Nicotine   • Devices:  Disposable   Substance and Sexual Activity   • Alcohol use: Yes     Alcohol/week: 1.0 standard drink     Types: 1 Cans of beer per week     Comment: socially   • Drug use: No   • Sexual activity: Defer       Family History   Problem Relation Age of Onset   • Heart attack Paternal Aunt    • Heart attack Paternal Grandfather        Review of Systems   All other systems reviewed and are negative.      Allergies   Allergen Reactions   • Lisinopril Cough and Hives   • Omeprazole Other (See Comments), Hives and Unknown (See Comments)     Poss rash  Poss rash  Poss rash         Current Outpatient Medications:   •  aspirin 81 MG chewable tablet, Chew 1 tablet Daily., Disp: 90 tablet, Rfl: 3  •  atorvastatin (LIPITOR) 40 MG tablet, Take 1 tablet by mouth Every Night., Disp: 90 tablet, Rfl: 3  •  carvedilol (COREG) 12.5 MG tablet, Take 1 tablet by mouth 2 (Two) Times a Day With Meals., Disp: 180 tablet, Rfl: 3  •  cetirizine (zyrTEC) 10 MG tablet, Take 1 tablet by mouth Daily., Disp: 90 tablet, Rfl: 3  •  fluticasone (Flonase) 50 MCG/ACT nasal spray, 2 sprays into the nostril(s) as directed by provider Daily., Disp: 48 g, Rfl: 3  •  glucose monitor monitoring kit, 1 each As Needed (daily to check glucose)., Disp: 1 each, Rfl: 0  •  Lancet Devices misc, 1 each Daily As Needed (to check glucose)., Disp: 100 each, Rfl: 1  •  metFORMIN ER (Glucophage XR) 500 MG 24 hr tablet, Take 2 tablets by mouth Daily With Breakfast., Disp: 180 tablet, Rfl: 3  •  nitroglycerin (NITROSTAT) 0.4 MG SL tablet, 1 under the tongue as needed for angina, may repeat q5mins for up three doses, Disp: 25 tablet, Rfl: 1  •  prasugrel (EFFIENT) 10 MG tablet, Take 1 tablet by mouth Daily., Disp: 90 tablet, Rfl: 3  •  True Metrix Blood Glucose Test test strip, USE TO TEST BLOOD GLUCOSE ONCE DAILY AS DIRECTED., Disp: 100 each, Rfl: 0      Objective:     Vitals:    03/07/23 1350   BP: 110/76   BP Location: Right arm   Pulse: 71   Weight: 105 kg (231 lb)  "  Height: 185.4 cm (72.99\")     Body mass index is 30.49 kg/m².    Vitals reviewed.   Constitutional:       Appearance: Healthy appearance. Well-developed and not in distress.   Eyes:      Conjunctiva/sclera: Conjunctivae normal.   HENT:      Head: Normocephalic.      Nose: Nose normal.         Comments: + plaque build up on teeth  Neck:      Vascular: No JVD. JVD normal.      Lymphadenopathy: No cervical adenopathy.   Pulmonary:      Effort: Pulmonary effort is normal.      Breath sounds: Normal breath sounds.   Cardiovascular:      Normal rate. Regular rhythm.      Murmurs: There is no murmur.   Pulses:     Intact distal pulses.   Edema:     Peripheral edema absent.   Abdominal:      Palpations: Abdomen is soft.      Tenderness: There is no abdominal tenderness.   Musculoskeletal: Normal range of motion.      Cervical back: Normal range of motion. Skin:     General: Skin is warm and dry.      Findings: No rash.   Neurological:      General: No focal deficit present.      Mental Status: Alert, oriented to person, place, and time and oriented to person, place and time.      Cranial Nerves: No cranial nerve deficit.   Psychiatric:         Behavior: Behavior normal.         Thought Content: Thought content normal.         Judgment: Judgment normal.         Procedures EKG --   I have personally reviewed EKG on 02/14/2023 and my interpretation of the tracing is as follows: NSR, 1st deg AVB, inferolateral Q waves, no change        Assessment:       Diagnosis Plan   1. Coronary artery disease involving native coronary artery of native heart without angina pectoris  Adult Transthoracic Echo Limited W/ Cont if Necessary Per Protocol      2. Mixed hyperlipidemia  Adult Transthoracic Echo Limited W/ Cont if Necessary Per Protocol      3. Type 2 diabetes mellitus with other circulatory complication, without long-term current use of insulin (HCC)  Adult Transthoracic Echo Limited W/ Cont if Necessary Per Protocol      4. " Essential hypertension  Adult Transthoracic Echo Limited W/ Cont if Necessary Per Protocol             Plan:       Mr Celaya had a STEMI in August 2022 due to plaque rupture in the RCA. He's doing great on aspirin, prasugrel, carvedilol, and atorvastatin.  An echo revealed normal ventricular systolic function with inferolateral hypokinesis.  He did not tolerate losartan or spironolactone due to low blood pressure.  His blood pressure is well controlled.  He has no worrisome cardiac symptoms.    He will remain on dual antiplatelet therapy until August 2023, then he will be switched to clopidogrel for monotherapy.    We will repeat an echo prior to his next visit.    His lipids show great improvement; his LDL is down to 70 but his HDL is still low at 30 (this is likely familial). His A1C is down to 6.8%, but this shows marked improvement. He is committed to his lifestyle changes.     Sincerely,       Jesse Agudelo MD

## 2023-03-31 LAB — GLUCOSE BLDC GLUCOMTR-MCNC: 92 MG/DL (ref 70–130)

## 2023-08-14 RX ORDER — ATORVASTATIN CALCIUM 40 MG/1
40 TABLET, FILM COATED ORAL NIGHTLY
Qty: 90 TABLET | Refills: 2 | Status: SHIPPED | OUTPATIENT
Start: 2023-08-14

## 2023-08-14 RX ORDER — PRASUGREL 10 MG/1
TABLET, FILM COATED ORAL
Qty: 90 TABLET | Refills: 0 | Status: SHIPPED | OUTPATIENT
Start: 2023-08-14

## 2023-08-28 ENCOUNTER — HOSPITAL ENCOUNTER (OUTPATIENT)
Dept: CARDIOLOGY | Facility: HOSPITAL | Age: 48
Discharge: HOME | End: 2023-08-28
Admitting: INTERNAL MEDICINE
Payer: COMMERCIAL

## 2023-08-28 VITALS
SYSTOLIC BLOOD PRESSURE: 138 MMHG | BODY MASS INDEX: 30.22 KG/M2 | WEIGHT: 228 LBS | HEIGHT: 73 IN | HEART RATE: 60 BPM | DIASTOLIC BLOOD PRESSURE: 85 MMHG

## 2023-08-28 DIAGNOSIS — I10 ESSENTIAL HYPERTENSION: ICD-10-CM

## 2023-08-28 DIAGNOSIS — E78.2 MIXED HYPERLIPIDEMIA: ICD-10-CM

## 2023-08-28 DIAGNOSIS — I25.10 CORONARY ARTERY DISEASE INVOLVING NATIVE CORONARY ARTERY OF NATIVE HEART WITHOUT ANGINA PECTORIS: ICD-10-CM

## 2023-08-28 DIAGNOSIS — E11.59 TYPE 2 DIABETES MELLITUS WITH OTHER CIRCULATORY COMPLICATION, WITHOUT LONG-TERM CURRENT USE OF INSULIN: ICD-10-CM

## 2023-08-28 LAB
BH CV ECHO MEAS - EDV(CUBED): 97.3 ML
BH CV ECHO MEAS - EDV(MOD-SP2): 79 ML
BH CV ECHO MEAS - EDV(MOD-SP4): 98 ML
BH CV ECHO MEAS - EF(MOD-BP): 65 %
BH CV ECHO MEAS - EF(MOD-SP2): 65.8 %
BH CV ECHO MEAS - EF(MOD-SP4): 62.2 %
BH CV ECHO MEAS - ESV(CUBED): 29.1 ML
BH CV ECHO MEAS - ESV(MOD-SP2): 27 ML
BH CV ECHO MEAS - ESV(MOD-SP4): 37 ML
BH CV ECHO MEAS - FS: 33.1 %
BH CV ECHO MEAS - IVS/LVPW: 1.08 CM
BH CV ECHO MEAS - IVSD: 1.3 CM
BH CV ECHO MEAS - LV DIASTOLIC VOL/BSA (35-75): 43.1 CM2
BH CV ECHO MEAS - LV MASS(C)D: 217.4 GRAMS
BH CV ECHO MEAS - LV SYSTOLIC VOL/BSA (12-30): 16.3 CM2
BH CV ECHO MEAS - LVIDD: 4.6 CM
BH CV ECHO MEAS - LVIDS: 3.1 CM
BH CV ECHO MEAS - LVPWD: 1.2 CM
BH CV ECHO MEAS - SI(MOD-SP2): 22.9 ML/M2
BH CV ECHO MEAS - SI(MOD-SP4): 26.8 ML/M2
BH CV ECHO MEAS - SV(MOD-SP2): 52 ML
BH CV ECHO MEAS - SV(MOD-SP4): 61 ML

## 2023-08-28 PROCEDURE — 93308 TTE F-UP OR LMTD: CPT

## 2023-08-28 PROCEDURE — 93325 DOPPLER ECHO COLOR FLOW MAPG: CPT

## 2023-08-28 PROCEDURE — 25510000001 PERFLUTREN (DEFINITY) 8.476 MG IN SODIUM CHLORIDE (PF) 0.9 % 10 ML INJECTION: Performed by: INTERNAL MEDICINE

## 2023-08-28 PROCEDURE — 93321 DOPPLER ECHO F-UP/LMTD STD: CPT

## 2023-08-28 RX ADMIN — SODIUM CHLORIDE 3 ML: 9 INJECTION INTRAMUSCULAR; INTRAVENOUS; SUBCUTANEOUS at 14:23

## 2023-08-28 NOTE — PROGRESS NOTES
I am covering Dr. Agudelo's in basket today because she is out of the office.     Please call patient with results.  Echocardiogram showed normal EF, mild LVH, and trace MR/TR.  Very stable echocardiogram.  Keep scheduled appointment in September.  Thank you

## 2023-08-29 NOTE — PROGRESS NOTES
Resolution of prior WMA. He has an appt with me in 3 weeks. I tried calling but got VM, I left a message that I would see him at the appt.

## 2023-10-03 NOTE — PROGRESS NOTES
RM:________     PCP: Lynn Weber MD    : 1975  AGE: 48 y.o.  EST PATIENT     REASON FOR VISIT/  CC:        BP Readings from Last 3 Encounters:   23 141/95   23 138/85   23 110/76      Wt Readings from Last 3 Encounters:   23 103 kg (228 lb)   23 103 kg (228 lb)   23 105 kg (231 lb)        WT: ____________ BP: __________L __________R HR______    CHEST PAIN: _____________    SOA: _____________PALPS: _______________     LIGHTHEADED: ___________FATIGUE: ________________ EDEMA __________    ALLERGIES:Lisinopril and Omeprazole SMOKING HISTORY:  Social History     Tobacco Use    Smoking status: Former     Packs/day: 0.25     Years: 10.00     Pack years: 2.50     Types: Cigarettes     Quit date: 2019     Years since quittin.7    Smokeless tobacco: Never    Tobacco comments:     Was on and off smoking until around    Vaping Use    Vaping Use: Former    Substances: Nicotine    Devices: Disposable   Substance Use Topics    Alcohol use: Yes     Alcohol/week: 1.0 standard drink     Types: 1 Cans of beer per week     Comment: socially    Drug use: No     CAFFEINE USE_________________  ALCOHOL ______________________         NEW DIAGNOSIS/ SURGERY/ HOSP OR ED VISITS: ______________________    __________________________________________________________________      RECENT LABS OR DIAGNOSTIC TESTING:  _____________________________    __________________________________________________________________      ASSESSMENT/ PLAN: _______________________________________________    __________________________________________________________________

## 2023-10-06 ENCOUNTER — OFFICE VISIT (OUTPATIENT)
Dept: INTERNAL MEDICINE | Facility: CLINIC | Age: 48
End: 2023-10-06
Payer: COMMERCIAL

## 2023-10-06 VITALS
SYSTOLIC BLOOD PRESSURE: 110 MMHG | OXYGEN SATURATION: 97 % | TEMPERATURE: 98 F | HEART RATE: 73 BPM | HEIGHT: 73 IN | WEIGHT: 228 LBS | DIASTOLIC BLOOD PRESSURE: 76 MMHG | BODY MASS INDEX: 30.22 KG/M2

## 2023-10-06 DIAGNOSIS — Z91.09 ENVIRONMENTAL ALLERGIES: ICD-10-CM

## 2023-10-06 DIAGNOSIS — J01.00 ACUTE NON-RECURRENT MAXILLARY SINUSITIS: Primary | ICD-10-CM

## 2023-10-06 PROCEDURE — 99213 OFFICE O/P EST LOW 20 MIN: CPT | Performed by: NURSE PRACTITIONER

## 2023-10-06 RX ORDER — AZITHROMYCIN 250 MG/1
TABLET, FILM COATED ORAL
Qty: 6 TABLET | Refills: 0 | Status: SHIPPED | OUTPATIENT
Start: 2023-10-06

## 2023-10-06 NOTE — PROGRESS NOTES
"Arnav Celaya is a 48 y.o. male presenting today for   Chief Complaint   Patient presents with    Sore Throat     F/U from [facility]     Pt presents for an acute visit; his PCP is Dr. Weber.    Subjective    History of Present Illness     UC with Glenis Brtiton APRN (09/18/2023)   History of Present Illness  Productive cough, sore throat, irritated eyes, left ear pain all started this morning after going to outdoor concert for 4 days. Negative covid test at work today. Reports coughing out brown phlegm from throat  Prescriptions for amoxicillin, benzonatate, and ipratroprium nasal spray sent to pharmacy  Sinus Infection, Adult    He completed 10 day course of Amox 10 days ago. He noted some improvement but no resolution. He notes continued ST and ear pain. He has continued to take Ibuprofen. Only using Zyrtec and Flonase sometimes.      The following portions of the patient's history were reviewed and updated as appropriate: allergies, current medications, problem list, past medical history, past surgical history, family history, and social history.    Review of Systems   Constitutional:  Negative for fever.   HENT:  Positive for congestion (R>L), ear pain, rhinorrhea (mild) and sore throat. Negative for ear discharge and hearing loss.    Respiratory:  Positive for cough (mild, but better than at [facility] visit).    Gastrointestinal:  Negative for abdominal pain, diarrhea and vomiting.   Musculoskeletal:  Positive for neck pain.   Skin:  Negative for rash.   Neurological:  Positive for dizziness and headache.       Objective    Vitals:    10/06/23 0957   BP: 110/76   BP Location: Left arm   Patient Position: Sitting   Cuff Size: Adult   Pulse: 73   Temp: 98 °F (36.7 °C)   TempSrc: Infrared   SpO2: 97%   Weight: 103 kg (228 lb)   Height: 185.4 cm (73\")     Body mass index is 30.08 kg/m².  Nursing notes and vitals reviewed.    Physical Exam  Constitutional:       General: He is not in acute distress.     " Appearance: He is well-developed.   HENT:      Head: Normocephalic.      Right Ear: Tympanic membrane, ear canal and external ear normal.      Left Ear: Ear canal and external ear normal. A middle ear effusion is present. Tympanic membrane is scarred. Tympanic membrane is not erythematous or bulging.      Nose: Nose normal. Rhinorrhea present.      Right Turbinates: Swollen.      Left Turbinates: Swollen.      Right Sinus: Maxillary sinus tenderness present.      Left Sinus: Maxillary sinus tenderness present.      Mouth/Throat:      Mouth: Mucous membranes are moist.      Pharynx: Oropharynx is clear. Uvula midline.      Comments: PND  Eyes:      Conjunctiva/sclera: Conjunctivae normal.   Neck:      Thyroid: No thyroid mass or thyromegaly.   Cardiovascular:      Rate and Rhythm: Regular rhythm.      Pulses: Normal pulses.      Heart sounds: S1 normal and S2 normal. No murmur heard.    No friction rub. No gallop.   Pulmonary:      Effort: Pulmonary effort is normal.      Breath sounds: Normal breath sounds. No wheezing, rhonchi or rales.   Musculoskeletal:      Cervical back: Neck supple.   Lymphadenopathy:      Cervical: No cervical adenopathy.   Neurological:      Mental Status: He is alert and oriented to person, place, and time.   Psychiatric:         Attention and Perception: He is attentive.         Speech: Speech normal.         Behavior: Behavior normal.         Thought Content: Thought content normal.         Assessment and Plan    Diagnoses and all orders for this visit:    1. Acute non-recurrent maxillary sinusitis (Primary)  -     azithromycin (Zithromax Z-Can) 250 MG tablet; Take 2 tablets the first day, then 1 tablet daily for 4 days.  Dispense: 6 tablet; Refill: 0    2. Environmental allergies      Encouraged QD use of Zyrtec and Flonase.      Medications, including side effects, were discussed with the patient. Patient verbalized understanding.  The plan of care was discussed. All questions were  answered. Patient verbalized understanding.        Return if symptoms worsen or fail to improve.

## 2023-10-10 ENCOUNTER — OFFICE VISIT (OUTPATIENT)
Dept: CARDIOLOGY | Facility: CLINIC | Age: 48
End: 2023-10-10
Payer: COMMERCIAL

## 2023-10-10 VITALS
BODY MASS INDEX: 30.22 KG/M2 | RESPIRATION RATE: 18 BRPM | HEIGHT: 73 IN | WEIGHT: 228 LBS | HEART RATE: 67 BPM | DIASTOLIC BLOOD PRESSURE: 76 MMHG | SYSTOLIC BLOOD PRESSURE: 110 MMHG | OXYGEN SATURATION: 97 %

## 2023-10-10 DIAGNOSIS — E11.59 TYPE 2 DIABETES MELLITUS WITH OTHER CIRCULATORY COMPLICATION, WITHOUT LONG-TERM CURRENT USE OF INSULIN: ICD-10-CM

## 2023-10-10 DIAGNOSIS — I10 ESSENTIAL HYPERTENSION: ICD-10-CM

## 2023-10-10 DIAGNOSIS — I25.10 CORONARY ARTERY DISEASE INVOLVING NATIVE CORONARY ARTERY OF NATIVE HEART WITHOUT ANGINA PECTORIS: Primary | ICD-10-CM

## 2023-10-10 DIAGNOSIS — E78.2 MIXED HYPERLIPIDEMIA: ICD-10-CM

## 2023-10-10 PROCEDURE — 99214 OFFICE O/P EST MOD 30 MIN: CPT | Performed by: INTERNAL MEDICINE

## 2023-10-10 PROCEDURE — 93000 ELECTROCARDIOGRAM COMPLETE: CPT | Performed by: INTERNAL MEDICINE

## 2023-10-10 RX ORDER — CLOPIDOGREL BISULFATE 75 MG/1
75 TABLET ORAL DAILY
Qty: 90 TABLET | Refills: 3 | Status: SHIPPED | OUTPATIENT
Start: 2023-10-10

## 2023-10-10 NOTE — PROGRESS NOTES
Date of Office Visit: 10/10/23  Encounter Provider: Jesse Agudelo MD  Place of Service: [facility] CARDIOLOGY  Patient Name: Arnav Celaya  :1975    Chief Complaint   Patient presents with    Coronary Artery Disease   :     HPI:     Mr. Celaya is 48 y.o. and presents today to follow up. I have reviewed prior notes and there are no changes except for any new updates described below. I have also reviewed any information entered into the medical record by the patient or by ancillary staff.     He presented in 2022 with an inferior STEMI. He had an ulcerated 95% lesion in the mid right coronary artery along with distal embolization of the posterolateral branch.  He underwent placement of a 4 x 18 mm Xience drug-eluting stent to the right coronary artery, postdilated to 5 mmHg. The distal vessel was treated with several rounds of intracoronary nitroglycerin, adenosine, and nitroprusside, and he was treated with eptifibatide for 24 hours to improve flow through the VIRAL.  An echo revealed normal left ventricular systolic function, ejection fraction 55%; there was inferior hypokinesis and mild to moderate mitral regurgitation.  He was discharged on aspirin, prasugrel, atorvastatin, carvedilol, losartan, and spironolactone.     He really changed his lifestyle after that and his weight and blood pressure immediately improved. A follow up echocardiogram showed normalization of LV wall motion and was normal.     He feels well! He has no angina, dyspnea, palpitations, lightheadedness, syncope, or edema.     Past Medical History:   Diagnosis Date    CAD (coronary artery disease) 2022    Inferior STEMI 2022: 95% mid RCA with distal embolization or rPLA, s/p 4x18mm MICHELLE post-dilated to 5mm.    Diabetes mellitus 3/7/2023    Gastroesophageal reflux disease 2019    Hemochromatosis     Mild intermittent asthma without complication 2018       Past Surgical History:    Procedure Laterality Date    CARDIAC CATHETERIZATION N/A 2022    Procedure: Left Heart Cath;  Surgeon: Donna Mendoza MD;  Location:  [facility];  Service: Cardiovascular;  Laterality: N/A;    CARDIAC CATHETERIZATION N/A 2022    Procedure: Coronary angiography;  Surgeon: Donna Mendoza MD;  Location:  [facility];  Service: Cardiovascular;  Laterality: N/A;    CARDIAC CATHETERIZATION N/A 2022    Procedure: Left ventriculography;  Surgeon: Donna Mendoza MD;  Location:  [facility];  Service: Cardiovascular;  Laterality: N/A;    CARDIAC CATHETERIZATION  2022    Procedure: STENT MICHELLE - CORONARY;  Surgeon: Donna Mendoza MD;  Location:  [facility];  Service: Cardiovascular;;    CARDIAC CATHETERIZATION N/A 2022    Procedure: Optical Coherent Tomography;  Surgeon: Donna Mendoza MD;  Location:  [facility];  Service: Cardiovascular;  Laterality: N/A;    CARDIAC CATHETERIZATION  2022    Procedure: Percutaneous Manual Thrombectomy;  Surgeon: Donna Mendoza MD;  Location:  [facility];  Service: Cardiovascular;;    CARDIAC CATHETERIZATION N/A 2022    Procedure: Percutaneous Coronary Intervention;  Surgeon: Donna Mendoza MD;  Location:  [facility];  Service: Cardiovascular;  Laterality: N/A;       Social History     Socioeconomic History    Marital status:    Tobacco Use    Smoking status: Former     Packs/day: 0.25     Years: 10.00     Additional pack years: 0.00     Total pack years: 2.50     Types: Cigarettes     Quit date: 2019     Years since quittin.7    Smokeless tobacco: Never    Tobacco comments:     Was on and off smoking until around 2019   Vaping Use    Vaping Use: Former    Substances: Nicotine    Devices: Disposable   Substance and Sexual Activity    Alcohol use: Yes     Alcohol/week: 1.0 standard drink of alcohol     Types: 1 Cans  of beer per week     Comment: socially    Drug use: No    Sexual activity: Defer       Family History   Problem Relation Age of Onset    Heart attack Paternal Aunt     Heart attack Paternal Grandfather        Review of Systems   All other systems reviewed and are negative.      Allergies   Allergen Reactions    Lisinopril Cough and Hives    Omeprazole Other (See Comments), Hives and Unknown (See Comments)     Poss rash  Poss rash  Poss rash         Current Outpatient Medications:     aspirin 81 MG chewable tablet, Chew 1 tablet Daily., Disp: 90 tablet, Rfl: 3    atorvastatin (LIPITOR) 40 MG tablet, TAKE 1 TABLET BY MOUTH EVERY NIGHT., Disp: 90 tablet, Rfl: 2    carvedilol (COREG) 12.5 MG tablet, Take 1 tablet by mouth 2 (Two) Times a Day With Meals., Disp: 180 tablet, Rfl: 3    fluticasone (Flonase) 50 MCG/ACT nasal spray, 2 sprays into the nostril(s) as directed by provider Daily., Disp: 48 g, Rfl: 3    glucose monitor monitoring kit, 1 each As Needed (daily to check glucose)., Disp: 1 each, Rfl: 0    Lancet Devices misc, 1 each Daily As Needed (to check glucose)., Disp: 100 each, Rfl: 1    metFORMIN ER (Glucophage XR) 500 MG 24 hr tablet, Take 2 tablets by mouth Daily With Breakfast., Disp: 180 tablet, Rfl: 3    nitroglycerin (NITROSTAT) 0.4 MG SL tablet, 1 under the tongue as needed for angina, may repeat q5mins for up three doses, Disp: 25 tablet, Rfl: 1    prasugrel (EFFIENT) 10 MG tablet, TAKE 1 TABLET BY MOUTH ONE TIME DAILY, Disp: 90 tablet, Rfl: 0    True Metrix Blood Glucose Test test strip, USE TO TEST BLOOD GLUCOSE ONCE DAILY AS DIRECTED., Disp: 100 each, Rfl: 0    Semaglutide,0.25 or 0.5MG/DOS, (Ozempic, 0.25 or 0.5 MG/DOSE,) 2 MG/3ML solution pen-injector, Inject 0.25 mg under the skin into the appropriate area as directed 1 (One) Time Per Week. (Patient not taking: Reported on 10/6/2023), Disp: 3 mL, Rfl: 1      Objective:     Vitals:    10/10/23 1121   BP: 110/76   Pulse: 67   Resp: 18   SpO2: 97%  "  Weight: 103 kg (228 lb)   Height: 185.4 cm (73\")     Body mass index is 30.08 kg/mý.    Vitals reviewed.   Constitutional:       Appearance: Healthy appearance. Well-developed and not in distress.   Eyes:      Conjunctiva/sclera: Conjunctivae normal.   HENT:      Head: Normocephalic.      Nose: Nose normal.   Neck:      Vascular: No JVD. JVD normal.      Lymphadenopathy: No cervical adenopathy.   Pulmonary:      Effort: Pulmonary effort is normal.      Breath sounds: Normal breath sounds.   Cardiovascular:      Normal rate. Regular rhythm.      Murmurs: There is no murmur.   Pulses:     Intact distal pulses.   Edema:     Peripheral edema absent.   Abdominal:      Palpations: Abdomen is soft.      Tenderness: There is no abdominal tenderness.   Musculoskeletal: Normal range of motion.      Cervical back: Normal range of motion. Skin:     General: Skin is warm and dry.      Findings: No rash.   Neurological:      General: No focal deficit present.      Mental Status: Alert, oriented to person, place, and time and oriented to person, place and time.      Cranial Nerves: No cranial nerve deficit.   Psychiatric:         Behavior: Behavior normal.         Thought Content: Thought content normal.         Judgment: Judgment normal.           ECG 12 Lead    Date/Time: 10/10/2023 11:48 AM  Performed by: Jesse Agudelo MD    Authorized by: Jesse Agudelo MD  Comparison: compared with previous ECG   Similar to previous ECG  Rhythm: sinus rhythm  Conduction: conduction normal  ST Segments: ST segments normal  T Waves: T waves normal  QRS axis: right  Other findings: poor R wave progression    Clinical impression: non-specific ECG       EKG --   I have personally reviewed EKG on 02/14/2023 and my interpretation of the tracing is as follows: NSR, 1st deg AVB, inferolateral Q waves, no change        Assessment:       Diagnosis Plan   1. Coronary artery disease involving native coronary artery of native heart without angina " pectoris        2. Mixed hyperlipidemia        3. Essential hypertension        4. Type 2 diabetes mellitus with other circulatory complication, without long-term current use of insulin               Plan:       Mr Celaya had a STEMI in August 2022 due to plaque rupture in the RCA. He's doing great on aspirin, prasugrel, carvedilol, and atorvastatin.  As we're more than a year out, I am going to switch him from DAPT to clopidogrel monotherapy. He has no worrisome cardiac symptoms.    His initial echo showed inferolateral hypokinesis; this resolved on a follow up study.      He will remain on atorvastatin and carvedilol. I'll see him back yearly.    Sincerely,       Jesse Agudelo MD

## 2023-11-03 ENCOUNTER — TELEPHONE (OUTPATIENT)
Dept: INTERNAL MEDICINE | Facility: CLINIC | Age: 48
End: 2023-11-03
Payer: COMMERCIAL

## 2023-11-03 NOTE — TELEPHONE ENCOUNTER
Caller: Carlene Celaya    Relationship: Emergency Contact    Best call back number: [phone]      What medication are you requesting: SEA SICK PATCHES      What are your current symptoms: SEA SICKNESS      If a prescription is needed, what is your preferred pharmacy and phone number[place] [facility] [place] [address] - [phone]  - [phone]       Additional notes: PATIENT IS GOING ON VACATION NEXT THURSDAY.

## 2023-11-04 RX ORDER — SCOLOPAMINE TRANSDERMAL SYSTEM 1 MG/1
1 PATCH, EXTENDED RELEASE TRANSDERMAL
Qty: 4 PATCH | Refills: 0 | Status: SHIPPED | OUTPATIENT
Start: 2023-11-04

## 2023-11-29 DIAGNOSIS — E11.9 TYPE 2 DIABETES MELLITUS WITHOUT COMPLICATION, WITHOUT LONG-TERM CURRENT USE OF INSULIN: ICD-10-CM

## 2023-11-30 NOTE — TELEPHONE ENCOUNTER
Rx Refill Note  Requested Prescriptions     Pending Prescriptions Disp Refills    Ozempic, 0.25 or 0.5 MG/DOSE, 2 MG/3ML solution pen-injector [Pharmacy Med Name: OZEMPIC INJ 2MG/3ML] 3 mL 0     Sig: INJECT 0.25 MG UNDER THE SKIN INTO APPROPIATE AREA ONCE WEEKLY AS DIRECTED.      Last office visit with prescribing clinician: 9/13/2022   Last telemedicine visit with prescribing clinician: 6/28/2023   Next office visit with prescribing clinician: Visit date not found                         Would you like a call back once the refill request has been completed: [] Yes [] No    If the office needs to give you a call back, can they leave a voicemail: [] Yes [] No    Mana Bell MA  11/30/23, 11:15 EST

## 2023-12-01 DIAGNOSIS — I10 ESSENTIAL HYPERTENSION: ICD-10-CM

## 2023-12-01 DIAGNOSIS — E11.9 TYPE 2 DIABETES MELLITUS WITHOUT COMPLICATION, WITHOUT LONG-TERM CURRENT USE OF INSULIN: Primary | ICD-10-CM

## 2023-12-01 DIAGNOSIS — E78.2 MIXED HYPERLIPIDEMIA: ICD-10-CM

## 2023-12-01 RX ORDER — SEMAGLUTIDE 0.68 MG/ML
INJECTION, SOLUTION SUBCUTANEOUS
Qty: 9 ML | Refills: 0 | Status: SHIPPED | OUTPATIENT
Start: 2023-12-01

## 2024-01-05 DIAGNOSIS — E11.9 TYPE 2 DIABETES MELLITUS WITHOUT COMPLICATION, WITHOUT LONG-TERM CURRENT USE OF INSULIN: ICD-10-CM

## 2024-01-05 RX ORDER — METFORMIN HYDROCHLORIDE 500 MG/1
1000 TABLET, EXTENDED RELEASE ORAL 2 TIMES DAILY
Qty: 360 TABLET | Refills: 3 | Status: CANCELLED | OUTPATIENT
Start: 2024-01-05

## 2024-01-05 RX ORDER — BLOOD-GLUCOSE SENSOR
1 EACH MISCELLANEOUS
Qty: 6 EACH | Refills: 3 | Status: SHIPPED | OUTPATIENT
Start: 2024-01-05

## 2024-01-05 RX ORDER — METFORMIN HYDROCHLORIDE EXTENDED-RELEASE TABLETS 1000 MG/1
1000 TABLET, FILM COATED, EXTENDED RELEASE ORAL 2 TIMES DAILY
Qty: 180 TABLET | Refills: 3 | Status: SHIPPED | OUTPATIENT
Start: 2024-01-05

## 2024-01-05 NOTE — TELEPHONE ENCOUNTER
Rx Refill Note  Requested Prescriptions     Pending Prescriptions Disp Refills    Continuous Blood Gluc Sensor (FreeStyle Justyna 3 Sensor) misc 6 each 3     Sig: Use 1 each Every 14 (Fourteen) Days.    metFORMIN (FORTAMET) 1000 MG (OSM) 24 hr tablet 180 tablet 3     Sig: Take 1 tablet by mouth 2 (Two) Times a Day.      Last office visit with prescribing clinician: 9/13/2022   Last telemedicine visit with prescribing clinician: Visit date not found   Next office visit with prescribing clinician: Visit date not found                         Would you like a call back once the refill request has been completed: [] Yes [] No    If the office needs to give you a call back, can they leave a voicemail: [] Yes [] No    Nisha Massey MA  01/05/24, 16:07 EST

## 2024-01-11 NOTE — TELEPHONE ENCOUNTER
[facility] called and said his insurance will not cover the metFORMIN (FORTAMET) 1000 MG (OSM) 24 hr tablet and he will need to go back to the metFORMIN ER (Glucophage XR) 500 MG 24 hr tablet. They said they will also need a new script sent for the 500mg metformin.    Rx Refill Note  Requested Prescriptions     Pending Prescriptions Disp Refills    metFORMIN ER (GLUCOPHAGE-XR) 500 MG 24 hr tablet       Sig: Take 1 tablet by mouth.      Last office visit with prescribing clinician: 9/13/2022   Last telemedicine visit with prescribing clinician: Visit date not found   Next office visit with prescribing clinician: Visit date not found                         Would you like a call back once the refill request has been completed: [] Yes [] No    If the office needs to give you a call back, can they leave a voicemail: [] Yes [] No    Mana Bell MA  01/15/24, 08:50 EST

## 2024-01-15 RX ORDER — METFORMIN HYDROCHLORIDE 500 MG/1
1000 TABLET, EXTENDED RELEASE ORAL
Qty: 180 TABLET | Refills: 1 | Status: SHIPPED | OUTPATIENT
Start: 2024-01-15

## 2024-01-24 DIAGNOSIS — E11.9 TYPE 2 DIABETES MELLITUS WITHOUT COMPLICATION, WITHOUT LONG-TERM CURRENT USE OF INSULIN: ICD-10-CM

## 2024-01-25 RX ORDER — SEMAGLUTIDE 0.68 MG/ML
INJECTION, SOLUTION SUBCUTANEOUS
Qty: 3 ML | Refills: 0 | Status: SHIPPED | OUTPATIENT
Start: 2024-01-25

## 2024-01-25 NOTE — TELEPHONE ENCOUNTER
HgA1c in past 6 months   Rx Refill Note  Requested Prescriptions     Pending Prescriptions Disp Refills    Ozempic, 0.25 or 0.5 MG/DOSE, 2 MG/3ML solution pen-injector [Pharmacy Med Name: OZEMPIC 2 MG/3ML INJECTION] 3 mL 0     Sig: INJECT 0.25 MG UNDER THE SKIN INTO APPROPIATE AREA ONCE WEEKLY AS DIRECTED.      Last office visit with prescribing clinician: 9/13/2022   Last telemedicine visit with prescribing clinician: Visit date not found   Next office visit with prescribing clinician: Visit date not found                         Would you like a call back once the refill request has been completed: [] Yes [] No    If the office needs to give you a call back, can they leave a voicemail: [] Yes [] No    Geovanni Hilliard, PCT  01/25/24, 09:23 EST

## 2024-02-02 ENCOUNTER — TELEPHONE (OUTPATIENT)
Dept: INTERNAL MEDICINE | Facility: CLINIC | Age: 49
End: 2024-02-02
Payer: COMMERCIAL

## 2024-02-02 RX ORDER — METFORMIN HYDROCHLORIDE 500 MG/1
1000 TABLET, EXTENDED RELEASE ORAL
Qty: 180 TABLET | Refills: 1 | Status: SHIPPED | OUTPATIENT
Start: 2024-02-02 | End: 2024-02-05 | Stop reason: SDUPTHER

## 2024-02-02 NOTE — TELEPHONE ENCOUNTER
Spoke with pharmacist- at beginning of January Metformin 1000mg BID was sent, but insurance wouldn't cover. Now we have sent Metformin 500mg for BID but pharmacist thinks this should be 500mg for 4 a day to equal 2,000 as originally sent. Are you okay with changing the directions or is this the correct directions?

## 2024-02-02 NOTE — TELEPHONE ENCOUNTER
UNABLE TO WARM TRANSFER.    [place] [place] [facility] [address] [facility] [phone]  - [phone] FX    Relationship: Pharmacy    Best call back number: [phone] PHARMACY    Which medication are you concerned about: METFORMIN SCRIPT NEEDS TO BE CLARIFIED     Who prescribed you this medication: TEO PHAN MD        What are your concerns: REQUEST RETURN CALL [facility] TO CLARIFY METFORMIN ORDER,  THANK YOU

## 2024-02-05 RX ORDER — METFORMIN HYDROCHLORIDE 500 MG/1
1000 TABLET, EXTENDED RELEASE ORAL 2 TIMES DAILY
Qty: 180 TABLET | Refills: 1 | Status: SHIPPED | OUTPATIENT
Start: 2024-02-05

## 2024-03-13 ENCOUNTER — TELEPHONE (OUTPATIENT)
Dept: INTERNAL MEDICINE | Facility: CLINIC | Age: 49
End: 2024-03-13
Payer: COMMERCIAL

## 2024-03-13 RX ORDER — FAMOTIDINE 40 MG/1
40 TABLET, FILM COATED ORAL 2 TIMES DAILY
Qty: 60 TABLET | Refills: 0 | Status: SHIPPED | OUTPATIENT
Start: 2024-03-13

## 2024-03-13 NOTE — TELEPHONE ENCOUNTER
Hold ozempic and start pepcid 2x a day for your stomach.  I will send in rx.  Once things are calmed down we can consider mounjaro.

## 2024-03-13 NOTE — TELEPHONE ENCOUNTER
----- Message from Geovanni Garcia MA sent at 3/13/2024  4:24 PM EDT -----  Regarding: FW: Stomach Issues  Contact: [phone]  Can you advise on if/when he should restart Ozempic and atorvastin? Stopped per  2/21.   And would you like him to schedule to discuss stomach?  ----- Message -----  From: Arnav Celaya  Sent: 3/13/2024   3:39 PM EDT  To: [facility] Clinical Pool  Subject: Stomach Issues                                   Dr Weber,    I dont want to bother you with this and I didnt want to send a text but I worry about the after affects of maybe some of my medicines Im on.    I havent taken my atorvastatin since I was seen on February 21st and was taken off of it because of Covid.   Should I start back on that now?    Also I havent taken my shot in the stomach for diabetes since then either (cant remember the name)  Do you want me to start that again as well.    Last thing,  almost everytime I eat ANYTHING,  10-15 minutes after I can have to use the restroom to a point where if Im not near a toilet I have to find one.  Can I have labs done soon to see if there is anything I need to worry about?       Let me know your thoughts,    CHRIS

## 2024-03-14 RX ORDER — CARVEDILOL 12.5 MG/1
12.5 TABLET ORAL 2 TIMES DAILY WITH MEALS
Qty: 540 TABLET | Refills: 0 | Status: SHIPPED | OUTPATIENT
Start: 2024-03-14

## 2024-05-20 RX ORDER — ATORVASTATIN CALCIUM 40 MG/1
40 TABLET, FILM COATED ORAL NIGHTLY
Qty: 90 TABLET | Refills: 1 | Status: SHIPPED | OUTPATIENT
Start: 2024-05-20

## 2024-08-20 ENCOUNTER — TELEPHONE (OUTPATIENT)
Dept: INTERNAL MEDICINE | Facility: CLINIC | Age: 49
End: 2024-08-20
Payer: COMMERCIAL

## 2024-08-20 DIAGNOSIS — E78.2 MIXED HYPERLIPIDEMIA: ICD-10-CM

## 2024-08-20 DIAGNOSIS — I10 ESSENTIAL HYPERTENSION: ICD-10-CM

## 2024-08-20 DIAGNOSIS — E11.9 TYPE 2 DIABETES MELLITUS WITHOUT COMPLICATION, WITHOUT LONG-TERM CURRENT USE OF INSULIN: Primary | ICD-10-CM

## 2024-08-20 DIAGNOSIS — I25.10 CORONARY ARTERY DISEASE INVOLVING NATIVE CORONARY ARTERY OF NATIVE HEART WITHOUT ANGINA PECTORIS: ICD-10-CM

## 2024-08-20 DIAGNOSIS — E11.65 UNCONTROLLED TYPE 2 DIABETES MELLITUS WITH HYPERGLYCEMIA: Primary | ICD-10-CM

## 2024-08-20 NOTE — TELEPHONE ENCOUNTER
Discussed with patient.  Labs in [facility],  mounjaro ordered.  F/u set up for 8/23

## 2024-08-20 NOTE — TELEPHONE ENCOUNTER
"Patient swearing, ripped bedding off of bed and threw it on the floor, stating \"I don't want anything from you. I just want my stuff so I can leave.\" Patient has tried to leave many times, big run risk.  " ----- Message from Kimmie CH sent at 8/19/2024  1:32 PM EDT -----  Regarding: FW: Blood Sugar  Contact: [phone]  Should patient schedule an appt to discuss ?  ----- Message -----  From: Arnav Celaya  Sent: 8/19/2024   1:26 PM EDT  To: [facility] Clinical Pool  Subject: Blood Sugar                                      Dr Weber...  I've been feeling run down pretty bad lately.  I've been taking meds as I should but haven't taken the shot in my stomach in several months because it was making me sick everytime I ate.      My blood pressure has been good but last night started checking blood Sugar again.  At 8pm last night it was 333 at 11pm it was 195 this morning when I woke up before breakfast it was 224 and 2 hours after breakfast it was 232.      I have came home to rest but want to know what I can do.  I will continue to check BP and was wanting to know if there is another BP in stomach that won't cause the side effects the last did.      Thank You    Franc Celaya

## 2024-08-20 NOTE — TELEPHONE ENCOUNTER
[facility] staff attempted to follow warm transfer process and was unsuccessful     Caller: Arnav Celaya    Relationship to patient: Self    Best call back number: [phone]    Patient is needing: TO SPEAK WITH DR PHAN OR CLINICAL STAFF REGARDING HIS BLOOD SUGAR LEVELS THAT HAVE BEEN ELEVATED AS HIGH  ON 08/18/24 AND THE LOWEST IT HAS BEEN  ON 08/18/24. HE HAS LEFT A MESSAGE FOR DR PHAN WITH NO RESPONSE AND THIS IS HIS 2ND PHONE CALL TODAY 08/20/24 WITH NO ONE ANSWERING AT THE OFFICE.     HIS BLOOD SUGAR IS  RIGHT NOW 08/20/24 AT 4:36 PM. THE LAST TIME HE HAD SOMETHING TO EAT WAS AT 1:45 THIS AFTERNOON.     PLEASE CALL BACK TO ADVISE PATIENT ON WHAT HE CAN DO TO LOWER THE BLOOD SUGAR LEVEL.     [facility] TRIED TO REACH [facility] WITH NO SUCCESS THERE EITHER.

## 2024-08-21 ENCOUNTER — LAB (OUTPATIENT)
Dept: LAB | Facility: HOSPITAL | Age: 49
End: 2024-08-21
Payer: COMMERCIAL

## 2024-08-21 LAB
ALBUMIN SERPL-MCNC: 4.2 G/DL (ref 3.5–5.2)
ALBUMIN UR-MCNC: <1.2 MG/DL
ALBUMIN/GLOB SERPL: 1.6 G/DL
ALP SERPL-CCNC: 51 U/L (ref 39–117)
ALT SERPL W P-5'-P-CCNC: 29 U/L (ref 1–41)
ANION GAP SERPL CALCULATED.3IONS-SCNC: 9.7 MMOL/L (ref 5–15)
AST SERPL-CCNC: 18 U/L (ref 1–40)
BASOPHILS # BLD AUTO: 0.03 10*3/MM3 (ref 0–0.2)
BASOPHILS NFR BLD AUTO: 0.5 % (ref 0–1.5)
BILIRUB SERPL-MCNC: 0.5 MG/DL (ref 0–1.2)
BUN SERPL-MCNC: 17 MG/DL (ref 6–20)
BUN/CREAT SERPL: 16.8 (ref 7–25)
CALCIUM SPEC-SCNC: 9.4 MG/DL (ref 8.6–10.5)
CHLORIDE SERPL-SCNC: 104 MMOL/L (ref 98–107)
CHOLEST SERPL-MCNC: 122 MG/DL (ref 0–200)
CO2 SERPL-SCNC: 22.3 MMOL/L (ref 22–29)
CREAT SERPL-MCNC: 1.01 MG/DL (ref 0.76–1.27)
CREAT UR-MCNC: 125 MG/DL
DEPRECATED RDW RBC AUTO: 39.2 FL (ref 37–54)
EGFRCR SERPLBLD CKD-EPI 2021: 90.6 ML/MIN/1.73
EOSINOPHIL # BLD AUTO: 0.18 10*3/MM3 (ref 0–0.4)
EOSINOPHIL NFR BLD AUTO: 3.1 % (ref 0.3–6.2)
ERYTHROCYTE [DISTWIDTH] IN BLOOD BY AUTOMATED COUNT: 11.9 % (ref 12.3–15.4)
GLOBULIN UR ELPH-MCNC: 2.6 GM/DL
GLUCOSE SERPL-MCNC: 196 MG/DL (ref 65–99)
HBA1C MFR BLD: 8.9 % (ref 4.8–5.6)
HCT VFR BLD AUTO: 46.1 % (ref 37.5–51)
HDLC SERPL-MCNC: 29 MG/DL (ref 40–60)
HGB BLD-MCNC: 16.2 G/DL (ref 13–17.7)
IMM GRANULOCYTES # BLD AUTO: 0.01 10*3/MM3 (ref 0–0.05)
IMM GRANULOCYTES NFR BLD AUTO: 0.2 % (ref 0–0.5)
LDLC SERPL CALC-MCNC: 65 MG/DL (ref 0–100)
LDLC/HDLC SERPL: 2.11 {RATIO}
LYMPHOCYTES # BLD AUTO: 1.64 10*3/MM3 (ref 0.7–3.1)
LYMPHOCYTES NFR BLD AUTO: 28.4 % (ref 19.6–45.3)
MCH RBC QN AUTO: 31.9 PG (ref 26.6–33)
MCHC RBC AUTO-ENTMCNC: 35.1 G/DL (ref 31.5–35.7)
MCV RBC AUTO: 90.7 FL (ref 79–97)
MICROALBUMIN/CREAT UR: NORMAL MG/G{CREAT}
MONOCYTES # BLD AUTO: 0.6 10*3/MM3 (ref 0.1–0.9)
MONOCYTES NFR BLD AUTO: 10.4 % (ref 5–12)
NEUTROPHILS NFR BLD AUTO: 3.32 10*3/MM3 (ref 1.7–7)
NEUTROPHILS NFR BLD AUTO: 57.4 % (ref 42.7–76)
NRBC BLD AUTO-RTO: 0 /100 WBC (ref 0–0.2)
PLATELET # BLD AUTO: 299 10*3/MM3 (ref 140–450)
PMV BLD AUTO: 9.6 FL (ref 6–12)
POTASSIUM SERPL-SCNC: 4.2 MMOL/L (ref 3.5–5.2)
PROT SERPL-MCNC: 6.8 G/DL (ref 6–8.5)
RBC # BLD AUTO: 5.08 10*6/MM3 (ref 4.14–5.8)
SODIUM SERPL-SCNC: 136 MMOL/L (ref 136–145)
T4 FREE SERPL-MCNC: 1.39 NG/DL (ref 0.92–1.68)
TRIGL SERPL-MCNC: 159 MG/DL (ref 0–150)
TSH SERPL DL<=0.05 MIU/L-ACNC: 1.99 UIU/ML (ref 0.27–4.2)
VLDLC SERPL-MCNC: 28 MG/DL (ref 5–40)
WBC NRBC COR # BLD AUTO: 5.78 10*3/MM3 (ref 3.4–10.8)

## 2024-08-21 PROCEDURE — 82043 UR ALBUMIN QUANTITATIVE: CPT | Performed by: INTERNAL MEDICINE

## 2024-08-21 PROCEDURE — 80061 LIPID PANEL: CPT | Performed by: INTERNAL MEDICINE

## 2024-08-21 PROCEDURE — 84439 ASSAY OF FREE THYROXINE: CPT | Performed by: INTERNAL MEDICINE

## 2024-08-21 PROCEDURE — 36415 COLL VENOUS BLD VENIPUNCTURE: CPT | Performed by: INTERNAL MEDICINE

## 2024-08-21 PROCEDURE — 80050 GENERAL HEALTH PANEL: CPT | Performed by: INTERNAL MEDICINE

## 2024-08-21 PROCEDURE — 83036 HEMOGLOBIN GLYCOSYLATED A1C: CPT | Performed by: INTERNAL MEDICINE

## 2024-08-21 PROCEDURE — 82570 ASSAY OF URINE CREATININE: CPT | Performed by: INTERNAL MEDICINE

## 2024-08-23 ENCOUNTER — TELEMEDICINE (OUTPATIENT)
Dept: INTERNAL MEDICINE | Facility: CLINIC | Age: 49
End: 2024-08-23
Payer: COMMERCIAL

## 2024-08-23 DIAGNOSIS — E11.65 UNCONTROLLED TYPE 2 DIABETES MELLITUS WITH HYPERGLYCEMIA: Primary | ICD-10-CM

## 2024-08-23 DIAGNOSIS — I10 ESSENTIAL HYPERTENSION: ICD-10-CM

## 2024-08-23 DIAGNOSIS — I25.10 CORONARY ARTERY DISEASE INVOLVING NATIVE CORONARY ARTERY OF NATIVE HEART WITHOUT ANGINA PECTORIS: ICD-10-CM

## 2024-08-23 DIAGNOSIS — E11.65 TYPE 2 DIABETES MELLITUS WITH HYPERGLYCEMIA, WITHOUT LONG-TERM CURRENT USE OF INSULIN: ICD-10-CM

## 2024-08-23 DIAGNOSIS — E78.2 MIXED HYPERLIPIDEMIA: ICD-10-CM

## 2024-08-23 PROCEDURE — 99214 OFFICE O/P EST MOD 30 MIN: CPT | Performed by: INTERNAL MEDICINE

## 2024-08-23 RX ORDER — BLOOD-GLUCOSE SENSOR
1 EACH MISCELLANEOUS
Qty: 6 EACH | Refills: 3 | Status: SHIPPED | OUTPATIENT
Start: 2024-08-23

## 2024-08-23 NOTE — PROGRESS NOTES
Arnav Celaya is a 49 y.o. male, who presents with a chief complaint of No chief complaint on file.          HPI   This visit has been scheduled as a telehealth visit to comply with patient safety concerns in accordance with CDC recommendations. This was an audio and video enabled telemedicine encounter.    You have chosen to receive care through a televisit visit. Do you consent to use a televisit visit for your medical care today? Yes    Pt is at home and I am in the office    T2DM - pt has not had labs in > 1 year.  6.8->9.0->8.9.  he has been on max dose metformin.  He has lots of stress at work and hs not been eating well.  Mounjaro was called in and he starts the med this morning.      Hemochromatosis - [facility].  Labs stable.     Htn 0     The following portions of the patient's history were reviewed and updated as appropriate: allergies, current medications, past family history, past medical history, past social history, past surgical history and problem list.    Allergies: Lisinopril and Omeprazole    Review of Systems          Wt Readings from Last 3 Encounters:   02/21/24 104 kg (230 lb)   10/10/23 103 kg (228 lb)   10/06/23 103 kg (228 lb)     Temp Readings from Last 3 Encounters:   02/21/24 99.3 °F (37.4 °C) (Infrared)   10/06/23 98 °F (36.7 °C) (Infrared)   09/18/23 98 °F (36.7 °C) (Infrared)     BP Readings from Last 3 Encounters:   02/21/24 116/82   10/10/23 110/76   10/06/23 110/76     Pulse Readings from Last 3 Encounters:   02/21/24 (!) 121   10/10/23 67   10/06/23 73     There is no height or weight on file to calculate BMI.  SpO2 Readings from Last 3 Encounters:   02/21/24 97%   10/10/23 97%   10/06/23 97%          Physical Exam    Results for orders placed or performed in visit on 08/20/24   Comprehensive Metabolic Panel    Specimen: Blood   Result Value Ref Range    Glucose 196 (H) 65 - 99 mg/dL    BUN 17 6 - 20 mg/dL    Creatinine 1.01 0.76 - 1.27 mg/dL    Sodium 136 136 - 145  mmol/L    Potassium 4.2 3.5 - 5.2 mmol/L    Chloride 104 98 - 107 mmol/L    CO2 22.3 22.0 - 29.0 mmol/L    Calcium 9.4 8.6 - 10.5 mg/dL    Total Protein 6.8 6.0 - 8.5 g/dL    Albumin 4.2 3.5 - 5.2 g/dL    ALT (SGPT) 29 1 - 41 U/L    AST (SGOT) 18 1 - 40 U/L    Alkaline Phosphatase 51 39 - 117 U/L    Total Bilirubin 0.5 0.0 - 1.2 mg/dL    Globulin 2.6 gm/dL    A/G Ratio 1.6 g/dL    BUN/Creatinine Ratio 16.8 7.0 - 25.0    Anion Gap 9.7 5.0 - 15.0 mmol/L    eGFR 90.6 >60.0 mL/min/1.73   Hemoglobin A1c    Specimen: Blood   Result Value Ref Range    Hemoglobin A1C 8.90 (H) 4.80 - 5.60 %   Lipid Panel With LDL / HDL Ratio    Specimen: Blood   Result Value Ref Range    Total Cholesterol 122 0 - 200 mg/dL    Triglycerides 159 (H) 0 - 150 mg/dL    HDL Cholesterol 29 (L) 40 - 60 mg/dL    LDL Cholesterol  65 0 - 100 mg/dL    VLDL Cholesterol 28 5 - 40 mg/dL    LDL/HDL Ratio 2.11    T4, Free    Specimen: Blood   Result Value Ref Range    Free T4 1.39 0.92 - 1.68 ng/dL   TSH    Specimen: Blood   Result Value Ref Range    TSH 1.990 0.270 - 4.200 uIU/mL   Microalbumin / Creatinine Urine Ratio - Urine, Clean Catch    Specimen: Urine, Clean Catch   Result Value Ref Range    Microalbumin/Creatinine Ratio      Creatinine, Urine 125.0 mg/dL    Microalbumin, Urine <1.2 mg/dL   CBC Auto Differential    Specimen: Blood   Result Value Ref Range    WBC 5.78 3.40 - 10.80 10*3/mm3    RBC 5.08 4.14 - 5.80 10*6/mm3    Hemoglobin 16.2 13.0 - 17.7 g/dL    Hematocrit 46.1 37.5 - 51.0 %    MCV 90.7 79.0 - 97.0 fL    MCH 31.9 26.6 - 33.0 pg    MCHC 35.1 31.5 - 35.7 g/dL    RDW 11.9 (L) 12.3 - 15.4 %    RDW-SD 39.2 37.0 - 54.0 fl    MPV 9.6 6.0 - 12.0 fL    Platelets 299 140 - 450 10*3/mm3    Neutrophil % 57.4 42.7 - 76.0 %    Lymphocyte % 28.4 19.6 - 45.3 %    Monocyte % 10.4 5.0 - 12.0 %    Eosinophil % 3.1 0.3 - 6.2 %    Basophil % 0.5 0.0 - 1.5 %    Immature Grans % 0.2 0.0 - 0.5 %    Neutrophils, Absolute 3.32 1.70 - 7.00 10*3/mm3     Lymphocytes, Absolute 1.64 0.70 - 3.10 10*3/mm3    Monocytes, Absolute 0.60 0.10 - 0.90 10*3/mm3    Eosinophils, Absolute 0.18 0.00 - 0.40 10*3/mm3    Basophils, Absolute 0.03 0.00 - 0.20 10*3/mm3    Immature Grans, Absolute 0.01 0.00 - 0.05 10*3/mm3    nRBC 0.0 0.0 - 0.2 /100 WBC     Result Review :                  Assessment and Plan    Diagnoses and all orders for this visit:    1. Uncontrolled type 2 diabetes mellitus with hyperglycemia (Primary) - start mounjaro today.  Cont metformin  -     Continuous Glucose Sensor (FreeStyle Justyna 3 Sensor) misc; Use 1 each Every 14 (Fourteen) Days.  Dispense: 6 each; Refill: 3  -     Comprehensive Metabolic Panel; Future  -     CBC & Differential; Future  -     Hemoglobin A1c; Future  -     Lipid Panel With LDL / HDL Ratio; Future  -     T4, Free; Future  -     TSH; Future  -     Microalbumin / Creatinine Urine Ratio - Urine, Clean Catch; Future    2. Type 2 diabetes mellitus with hyperglycemia, without long-term current use of insulin  -     Continuous Glucose Sensor (FreeStyle Justyna 3 Sensor) misc; Use 1 each Every 14 (Fourteen) Days.  Dispense: 6 each; Refill: 3    3. Essential hypertension - on carvedilol  -     Comprehensive Metabolic Panel; Future  -     CBC & Differential; Future  -     Hemoglobin A1c; Future  -     Lipid Panel With LDL / HDL Ratio; Future  -     T4, Free; Future  -     TSH; Future  -     Microalbumin / Creatinine Urine Ratio - Urine, Clean Catch; Future    4. Coronary artery disease involving native coronary artery of native heart without angina pectoris - cont carvedilol, lipitor, plavix    5. Mixed hyperlipidemia - cont lipitor  -     CBC & Differential; Future  -     Lipid Panel With LDL / HDL Ratio; Future         BMI is >= 30 and <35. (Class 1 Obesity). The following options were offered after discussion;: exercise counseling/recommendations and nutrition counseling/recommendations             Outpatient Medications Prior to Visit   Medication  Sig Dispense Refill    atorvastatin (LIPITOR) 40 MG tablet TAKE 1 TABLET BY MOUTH EVERY NIGHT. 90 tablet 1    carvedilol (COREG) 12.5 MG tablet TAKE 1 TABLET BY MOUTH TWICE DAILY WITH MEALS 540 tablet 0    clopidogrel (PLAVIX) 75 MG tablet Take 1 tablet by mouth Daily. 90 tablet 3    fluticasone (Flonase) 50 MCG/ACT nasal spray 2 sprays into the nostril(s) as directed by provider Daily. 48 g 3    glucose monitor monitoring kit 1 each As Needed (daily to check glucose). 1 each 0    Lancet Devices misc 1 each Daily As Needed (to check glucose). 100 each 1    metFORMIN ER (GLUCOPHAGE-XR) 500 MG 24 hr tablet Take 2 tablets by mouth 2 (Two) Times a Day. 180 tablet 1    nitroglycerin (NITROSTAT) 0.4 MG SL tablet 1 under the tongue as needed for angina, may repeat q5mins for up three doses 25 tablet 1    Scopolamine 1 MG/3DAYS patch Place 1 patch on the skin as directed by provider Every 72 (Seventy-Two) Hours. 4 patch 0    Tirzepatide (MOUNJARO) 2.5 MG/0.5ML solution pen-injector pen Inject 0.5 mL under the skin into the appropriate area as directed 1 (One) Time Per Week. 4 mL 0    True Metrix Blood Glucose Test test strip USE TO TEST BLOOD GLUCOSE ONCE DAILY AS DIRECTED. 100 each 0    benzonatate (TESSALON) 200 MG capsule Take 1 capsule by mouth 3 (Three) Times a Day As Needed for Cough. 30 capsule 0    Continuous Blood Gluc Sensor (FreeStyle Justyna 3 Sensor) [facility] Use 1 each Every 14 (Fourteen) Days. 6 each 3    famotidine (Pepcid) 40 MG tablet Take 1 tablet by mouth 2 (Two) Times a Day. 60 tablet 0    ipratropium (ATROVENT) 0.06 % nasal spray 2 sprays into the nostril(s) as directed by provider 4 (Four) Times a Day. 1 each 0    Nirmatrelvir & Ritonavir, 300mg/100mg, (PAXLOVID) 20 x 150 MG & 10 x 100MG tablet therapy pack tablet Take 3 tablets by mouth 2 (Two) Times a Day. 30 tablet 0    prasugrel (EFFIENT) 10 MG tablet        No facility-administered medications prior to visit.     New Medications Ordered This Visit    Medications    Continuous Glucose Sensor (FreeStyle Justyna 3 Sensor) misc     Sig: Use 1 each Every 14 (Fourteen) Days.     Dispense:  6 each     Refill:  3     [unfilled]  Medications Discontinued During This Encounter   Medication Reason    Nirmatrelvir & Ritonavir, 300mg/100mg, (PAXLOVID) 20 x 150 MG & 10 x 100MG tablet therapy pack tablet *Therapy completed    famotidine (Pepcid) 40 MG tablet *Therapy completed    prasugrel (EFFIENT) 10 MG tablet *Therapy completed    benzonatate (TESSALON) 200 MG capsule *Therapy completed    ipratropium (ATROVENT) 0.06 % nasal spray *Therapy completed    Continuous Blood Gluc Sensor (FreeStyle Justyna 3 Sensor) misc Reorder         Return in about 3 months (around 11/23/2024) for Recheck, labs.    Patient was given instructions and counseling regarding her condition or for health maintenance advice. Please see specific information pulled into the AVS if appropriate.

## 2024-09-13 DIAGNOSIS — I25.10 CORONARY ARTERY DISEASE INVOLVING NATIVE CORONARY ARTERY OF NATIVE HEART WITHOUT ANGINA PECTORIS: ICD-10-CM

## 2024-09-13 DIAGNOSIS — E11.9 TYPE 2 DIABETES MELLITUS WITHOUT COMPLICATION, WITHOUT LONG-TERM CURRENT USE OF INSULIN: ICD-10-CM

## 2024-09-18 DIAGNOSIS — E11.65 TYPE 2 DIABETES MELLITUS WITH HYPERGLYCEMIA, WITHOUT LONG-TERM CURRENT USE OF INSULIN: Primary | ICD-10-CM

## 2024-09-18 DIAGNOSIS — I25.10 CORONARY ARTERY DISEASE INVOLVING NATIVE CORONARY ARTERY OF NATIVE HEART WITHOUT ANGINA PECTORIS: ICD-10-CM

## 2024-09-18 RX ORDER — TIRZEPATIDE 2.5 MG/.5ML
INJECTION, SOLUTION SUBCUTANEOUS
Qty: 2 ML | Refills: 0 | OUTPATIENT
Start: 2024-09-18

## 2024-09-24 ENCOUNTER — DOCUMENTATION (OUTPATIENT)
Dept: INTERNAL MEDICINE | Facility: CLINIC | Age: 49
End: 2024-09-24
Payer: COMMERCIAL

## 2024-10-02 ENCOUNTER — TELEMEDICINE (OUTPATIENT)
Dept: INTERNAL MEDICINE | Facility: CLINIC | Age: 49
End: 2024-10-02
Payer: COMMERCIAL

## 2024-10-02 VITALS — BODY MASS INDEX: 27.43 KG/M2 | HEIGHT: 73 IN | WEIGHT: 207 LBS

## 2024-10-02 DIAGNOSIS — E11.65 TYPE 2 DIABETES MELLITUS WITH HYPERGLYCEMIA, WITHOUT LONG-TERM CURRENT USE OF INSULIN: ICD-10-CM

## 2024-10-02 DIAGNOSIS — J40 BRONCHITIS DUE TO COVID-19 VIRUS: Primary | ICD-10-CM

## 2024-10-02 DIAGNOSIS — U07.1 BRONCHITIS DUE TO COVID-19 VIRUS: Primary | ICD-10-CM

## 2024-10-02 PROCEDURE — 99214 OFFICE O/P EST MOD 30 MIN: CPT | Performed by: INTERNAL MEDICINE

## 2024-10-02 RX ORDER — PREDNISONE 20 MG/1
40 TABLET ORAL DAILY
Qty: 10 TABLET | Refills: 0 | Status: SHIPPED | OUTPATIENT
Start: 2024-10-02 | End: 2024-10-07

## 2024-10-02 RX ORDER — AZITHROMYCIN 250 MG/1
TABLET, FILM COATED ORAL
Qty: 6 TABLET | Refills: 0 | Status: SHIPPED | OUTPATIENT
Start: 2024-10-02

## 2024-10-02 RX ORDER — BENZONATATE 200 MG/1
200 CAPSULE ORAL 3 TIMES DAILY PRN
Qty: 21 CAPSULE | Refills: 0 | Status: SHIPPED | OUTPATIENT
Start: 2024-10-02 | End: 2024-10-09

## 2024-10-02 NOTE — PROGRESS NOTES
Arnav Celaya is a 49 y.o. male, who presents with a chief complaint of   Chief Complaint   Patient presents with    Covid      Tested positive last Tuesday started to get better and then started to feel bad again Monday.           HPI   This visit has been scheduled as a telehealth visit to comply with patient safety concerns in accordance with CDC recommendations. This was an audio and video enabled telemedicine encounter.    You have chosen to receive care through a televisit visit. Do you consent to use a televisit visit for your medical care today? Yes    Pt is at home and I am in the office    He tested positive for covid last Tuesday.  He finished paxlovid.  He felt good after a few days but now feels bad again.  He has lots of head and chest congestion.  He has thick mucus and a cough that is productive     T2DM - pt has lost more than 20 lbs since being on mounjaro.  Glucoses trending down    The following portions of the patient's history were reviewed and updated as appropriate: allergies, current medications, past family history, past medical history, past social history, past surgical history and problem list.    Allergies: Lisinopril and Omeprazole    Review of Systems   Constitutional:  Positive for fatigue. Negative for fever.   HENT:  Positive for congestion.    Eyes: Negative.    Respiratory:  Positive for cough.    Cardiovascular: Negative.    Gastrointestinal: Negative.    Endocrine: Negative.    Genitourinary: Negative.    Musculoskeletal: Negative.    Skin: Negative.    Allergic/Immunologic: Negative.    Neurological: Negative.    Hematological: Negative.    Psychiatric/Behavioral: Negative.     All other systems reviewed and are negative.            Wt Readings from Last 3 Encounters:   10/02/24 93.9 kg (207 lb)   02/21/24 104 kg (230 lb)   10/10/23 103 kg (228 lb)     Temp Readings from Last 3 Encounters:   02/21/24 99.3 °F (37.4 °C) (Infrared)   10/06/23 98 °F (36.7 °C) (Infrared)    09/18/23 98 °F (36.7 °C) (Infrared)     BP Readings from Last 3 Encounters:   02/21/24 116/82   10/10/23 110/76   10/06/23 110/76     Pulse Readings from Last 3 Encounters:   02/21/24 (!) 121   10/10/23 67   10/06/23 73     Body mass index is 27.31 kg/m².  SpO2 Readings from Last 3 Encounters:   02/21/24 97%   10/10/23 97%   10/06/23 97%          Physical Exam  Vitals and nursing note reviewed.   Constitutional:       General: He is not in acute distress.     Appearance: He is well-developed.   HENT:      Head: Normocephalic and atraumatic.      Right Ear: External ear normal.      Left Ear: External ear normal.      Nose: Nose normal.   Eyes:      Conjunctiva/sclera: Conjunctivae normal.   Pulmonary:      Effort: Pulmonary effort is normal. No respiratory distress.   Musculoskeletal:      Cervical back: Normal range of motion and neck supple.   Skin:     Findings: No rash.   Neurological:      Mental Status: He is alert and oriented to person, place, and time.   Psychiatric:         Behavior: Behavior normal.         Thought Content: Thought content normal.         Judgment: Judgment normal.         Results for orders placed or performed in visit on 08/20/24   Comprehensive Metabolic Panel    Specimen: Blood   Result Value Ref Range    Glucose 196 (H) 65 - 99 mg/dL    BUN 17 6 - 20 mg/dL    Creatinine 1.01 0.76 - 1.27 mg/dL    Sodium 136 136 - 145 mmol/L    Potassium 4.2 3.5 - 5.2 mmol/L    Chloride 104 98 - 107 mmol/L    CO2 22.3 22.0 - 29.0 mmol/L    Calcium 9.4 8.6 - 10.5 mg/dL    Total Protein 6.8 6.0 - 8.5 g/dL    Albumin 4.2 3.5 - 5.2 g/dL    ALT (SGPT) 29 1 - 41 U/L    AST (SGOT) 18 1 - 40 U/L    Alkaline Phosphatase 51 39 - 117 U/L    Total Bilirubin 0.5 0.0 - 1.2 mg/dL    Globulin 2.6 gm/dL    A/G Ratio 1.6 g/dL    BUN/Creatinine Ratio 16.8 7.0 - 25.0    Anion Gap 9.7 5.0 - 15.0 mmol/L    eGFR 90.6 >60.0 mL/min/1.73   Hemoglobin A1c    Specimen: Blood   Result Value Ref Range    Hemoglobin A1C 8.90 (H)  4.80 - 5.60 %   Lipid Panel With LDL / HDL Ratio    Specimen: Blood   Result Value Ref Range    Total Cholesterol 122 0 - 200 mg/dL    Triglycerides 159 (H) 0 - 150 mg/dL    HDL Cholesterol 29 (L) 40 - 60 mg/dL    LDL Cholesterol  65 0 - 100 mg/dL    VLDL Cholesterol 28 5 - 40 mg/dL    LDL/HDL Ratio 2.11    T4, Free    Specimen: Blood   Result Value Ref Range    Free T4 1.39 0.92 - 1.68 ng/dL   TSH    Specimen: Blood   Result Value Ref Range    TSH 1.990 0.270 - 4.200 uIU/mL   Microalbumin / Creatinine Urine Ratio - Urine, Clean Catch    Specimen: Urine, Clean Catch   Result Value Ref Range    Microalbumin/Creatinine Ratio      Creatinine, Urine 125.0 mg/dL    Microalbumin, Urine <1.2 mg/dL   CBC Auto Differential    Specimen: Blood   Result Value Ref Range    WBC 5.78 3.40 - 10.80 10*3/mm3    RBC 5.08 4.14 - 5.80 10*6/mm3    Hemoglobin 16.2 13.0 - 17.7 g/dL    Hematocrit 46.1 37.5 - 51.0 %    MCV 90.7 79.0 - 97.0 fL    MCH 31.9 26.6 - 33.0 pg    MCHC 35.1 31.5 - 35.7 g/dL    RDW 11.9 (L) 12.3 - 15.4 %    RDW-SD 39.2 37.0 - 54.0 fl    MPV 9.6 6.0 - 12.0 fL    Platelets 299 140 - 450 10*3/mm3    Neutrophil % 57.4 42.7 - 76.0 %    Lymphocyte % 28.4 19.6 - 45.3 %    Monocyte % 10.4 5.0 - 12.0 %    Eosinophil % 3.1 0.3 - 6.2 %    Basophil % 0.5 0.0 - 1.5 %    Immature Grans % 0.2 0.0 - 0.5 %    Neutrophils, Absolute 3.32 1.70 - 7.00 10*3/mm3    Lymphocytes, Absolute 1.64 0.70 - 3.10 10*3/mm3    Monocytes, Absolute 0.60 0.10 - 0.90 10*3/mm3    Eosinophils, Absolute 0.18 0.00 - 0.40 10*3/mm3    Basophils, Absolute 0.03 0.00 - 0.20 10*3/mm3    Immature Grans, Absolute 0.01 0.00 - 0.05 10*3/mm3    nRBC 0.0 0.0 - 0.2 /100 WBC     Result Review :                  Assessment and Plan    Diagnoses and all orders for this visit:    1. Bronchitis due to COVID-19 virus (Primary)  -     benzonatate (TESSALON) 200 MG capsule; Take 1 capsule by mouth 3 (Three) Times a Day As Needed for Cough for up to 7 days.  Dispense: 21 capsule;  Refill: 0  -     azithromycin (Zithromax) 250 MG tablet; Take 2 tablets the first day, then 1 tablet daily for 4 days.  Dispense: 6 tablet; Refill: 0  -     predniSONE (DELTASONE) 20 MG tablet; Take 2 tablets by mouth Daily for 5 days.  Dispense: 10 tablet; Refill: 0    2. Type 2 diabetes mellitus with hyperglycemia, without long-term current use of insulin  -     Tirzepatide (MOUNJARO) 7.5 MG/0.5ML solution pen-injector pen; Inject 0.5 mL under the skin into the appropriate area as directed 1 (One) Time Per Week.  Dispense: 6 mL; Refill: 1                      Outpatient Medications Prior to Visit   Medication Sig Dispense Refill    atorvastatin (LIPITOR) 40 MG tablet TAKE 1 TABLET BY MOUTH EVERY NIGHT. 90 tablet 1    carvedilol (COREG) 12.5 MG tablet TAKE 1 TABLET BY MOUTH TWICE DAILY WITH MEALS 540 tablet 0    clopidogrel (PLAVIX) 75 MG tablet Take 1 tablet by mouth Daily. 90 tablet 3    Continuous Glucose Sensor (FreeStyle Justyna 3 Sensor) misc Use 1 each Every 14 (Fourteen) Days. 6 each 3    fluticasone (Flonase) 50 MCG/ACT nasal spray 2 sprays into the nostril(s) as directed by provider Daily. 48 g 3    glucose monitor monitoring kit 1 each As Needed (daily to check glucose). 1 each 0    Lancet Devices misc 1 each Daily As Needed (to check glucose). 100 each 1    metFORMIN ER (GLUCOPHAGE-XR) 500 MG 24 hr tablet Take 2 tablets by mouth 2 (Two) Times a Day. 180 tablet 1    nitroglycerin (NITROSTAT) 0.4 MG SL tablet 1 under the tongue as needed for angina, may repeat q5mins for up three doses 25 tablet 1    Scopolamine 1 MG/3DAYS patch Place 1 patch on the skin as directed by provider Every 72 (Seventy-Two) Hours. 4 patch 0    True Metrix Blood Glucose Test test strip USE TO TEST BLOOD GLUCOSE ONCE DAILY AS DIRECTED. 100 each 0    Tirzepatide (MOUNJARO) 5 MG/0.5ML solution pen-injector pen Inject 0.5 mL under the skin into the appropriate area as directed 1 (One) Time Per Week. 2 mL 1     No facility-administered  medications prior to visit.     New Medications Ordered This Visit   Medications    benzonatate (TESSALON) 200 MG capsule     Sig: Take 1 capsule by mouth 3 (Three) Times a Day As Needed for Cough for up to 7 days.     Dispense:  21 capsule     Refill:  0    azithromycin (Zithromax) 250 MG tablet     Sig: Take 2 tablets the first day, then 1 tablet daily for 4 days.     Dispense:  6 tablet     Refill:  0    predniSONE (DELTASONE) 20 MG tablet     Sig: Take 2 tablets by mouth Daily for 5 days.     Dispense:  10 tablet     Refill:  0    Tirzepatide (MOUNJARO) 7.5 MG/0.5ML solution pen-injector pen     Sig: Inject 0.5 mL under the skin into the appropriate area as directed 1 (One) Time Per Week.     Dispense:  6 mL     Refill:  1     [unfilled]  Medications Discontinued During This Encounter   Medication Reason    Tirzepatide (MOUNJARO) 5 MG/0.5ML solution pen-injector pen          No follow-ups on file.    Patient was given instructions and counseling regarding her condition or for health maintenance advice. Please see specific information pulled into the AVS if appropriate.

## 2024-11-18 ENCOUNTER — DOCUMENTATION (OUTPATIENT)
Dept: INTERNAL MEDICINE | Facility: CLINIC | Age: 49
End: 2024-11-18
Payer: COMMERCIAL

## 2024-11-18 DIAGNOSIS — E11.9 TYPE 2 DIABETES MELLITUS WITHOUT COMPLICATION, WITHOUT LONG-TERM CURRENT USE OF INSULIN: Primary | ICD-10-CM

## 2024-11-18 RX ORDER — ATORVASTATIN CALCIUM 40 MG/1
40 TABLET, FILM COATED ORAL NIGHTLY
Qty: 90 TABLET | Refills: 1 | Status: SHIPPED | OUTPATIENT
Start: 2024-11-18

## 2024-11-18 RX ORDER — CLOPIDOGREL BISULFATE 75 MG/1
75 TABLET ORAL DAILY
Qty: 90 TABLET | Refills: 3 | Status: SHIPPED | OUTPATIENT
Start: 2024-11-18

## 2024-11-18 RX ORDER — CARVEDILOL 12.5 MG/1
12.5 TABLET ORAL 2 TIMES DAILY WITH MEALS
Qty: 540 TABLET | Refills: 0 | Status: SHIPPED | OUTPATIENT
Start: 2024-11-18

## 2024-11-18 RX ORDER — METFORMIN HYDROCHLORIDE 500 MG/1
1000 TABLET, EXTENDED RELEASE ORAL 2 TIMES DAILY
Qty: 180 TABLET | Refills: 1 | Status: SHIPPED | OUTPATIENT
Start: 2024-11-18

## 2024-11-19 RX ORDER — METFORMIN HYDROCHLORIDE 500 MG/1
1000 TABLET, EXTENDED RELEASE ORAL 2 TIMES DAILY
Qty: 180 TABLET | Refills: 0 | OUTPATIENT
Start: 2024-11-19

## 2024-11-19 NOTE — TELEPHONE ENCOUNTER
Was ordered yesterday by    Rx Refill Note  Requested Prescriptions     Refused Prescriptions Disp Refills    metFORMIN ER (GLUCOPHAGE-XR) 500 MG 24 hr tablet [Pharmacy Med Name: METFORMIN 500MG ER] 180 tablet 0     Sig: Take 2 tablets by mouth 2 (Two) Times a Day.      Last office visit with prescribing clinician: Visit date not found   Last telemedicine visit with prescribing clinician: 10/2/2024   Next office visit with prescribing clinician: Visit date not found                         Would you like a call back once the refill request has been completed: [] Yes [] No    If the office needs to give you a call back, can they leave a voicemail: [] Yes [] No    Ira Allen MA  11/19/24, 13:31 EST

## 2024-11-21 RX ORDER — CLOPIDOGREL BISULFATE 75 MG/1
75 TABLET ORAL DAILY
Qty: 90 TABLET | Refills: 2 | OUTPATIENT
Start: 2024-11-21

## 2024-12-06 ENCOUNTER — TELEPHONE (OUTPATIENT)
Dept: INTERNAL MEDICINE | Facility: CLINIC | Age: 49
End: 2024-12-06
Payer: COMMERCIAL

## 2024-12-06 DIAGNOSIS — I25.10 CORONARY ARTERY DISEASE INVOLVING NATIVE CORONARY ARTERY OF NATIVE HEART WITHOUT ANGINA PECTORIS: ICD-10-CM

## 2024-12-06 DIAGNOSIS — E11.65 TYPE 2 DIABETES MELLITUS WITH HYPERGLYCEMIA, WITHOUT LONG-TERM CURRENT USE OF INSULIN: ICD-10-CM

## 2024-12-06 DIAGNOSIS — E11.65 UNCONTROLLED TYPE 2 DIABETES MELLITUS WITH HYPERGLYCEMIA: Primary | ICD-10-CM

## 2024-12-06 NOTE — PROGRESS NOTES
RM:________     PCP: Lynn Weber MD    : 1975  AGE: 49 y.o.  EST PATIENT   REASON FOR VISIT/  CC:    Wt Readings from Last 3 Encounters:   10/02/24 93.9 kg (207 lb)   24 104 kg (230 lb)   10/10/23 103 kg (228 lb)      BP Readings from Last 3 Encounters:   24 116/82   10/10/23 110/76   10/06/23 110/76      WT: ____________ BP: __________L __________R HR______    ALLERGIES:Lisinopril and Omeprazole SMOKING HISTORY:  Social History     Tobacco Use    Smoking status: Former     Current packs/day: 0.00     Average packs/day: 0.3 packs/day for 10.0 years (2.5 ttl pk-yrs)     Types: Cigarettes     Start date: 2009     Quit date: 2019     Years since quittin.9    Smokeless tobacco: Never    Tobacco comments:     Was on and off smoking until around 2019   Vaping Use    Vaping status: Former    Substances: Nicotine    Devices: Disposable   Substance Use Topics    Alcohol use: Yes     Alcohol/week: 1.0 standard drink of alcohol     Types: 1 Cans of beer per week     Comment: socially    Drug use: No     CAFFEINE USE_________________  ALCOHOL ______________________    Below is the patient's most recent value for Albumin, ALT, AST, BUN, Calcium, Chloride, Cholesterol, CO2, Creatinine, GFR, Glucose, HDL, Hematocrit, Hemoglobin, Hemoglobin A1C, LDL, Magnesium, Phosphorus, Platelets, Potassium, PSA, Sodium, Triglycerides, TSH and WBC.   Lab Results   Component Value Date    ALBUMIN 4.2 2024    ALT 29 2024    AST 18 2024    BUN 17 2024    CALCIUM 9.4 2024     2024    CHOL 122 2024    CO2 22.3 2024    CREATININE 1.01 2024    HDL 29 (L) 2024    HCT 46.1 2024    HGB 16.2 2024    HGBA1C 8.90 (H) 2024    LDL 65 2024     2024    K 4.2 2024     2024    TRIG 159 (H) 2024    TSH 1.990 2024    WBC 5.78 2024          NEW DIAGNOSIS/ SURGERY/ HOSP OR ED VISITS:  ______________________    __________________________________________________________________      RECENT LABS OR DIAGNOSTIC TESTING:  _____________________________    __________________________________________________________________      ASSESSMENT/ PLAN: _______________________________________________    __________________________________________________________________

## 2024-12-06 NOTE — TELEPHONE ENCOUNTER
Caller: Arnav Celaya    Relationship: Self    Best call back number: [phone]    What medication are you requesting:     Tirzepatide (MOUNJARO)        If a prescription is needed, what is your preferred pharmacy and phone number[place] [facility] [place] [facility] [address] [facility] [phone]  - [phone]      Additional notes:PATIENT STATES HE IS LOSING TOO MUCH WEIGHT ON CURRENT DOSAGE. REQUESTS THAT PRESCRIPTION BE SENT FOR 5.0 DOSAGE     PATIENT HAS GONE TWICE TO  PRESCRIPTION, BOTH TIMES IT HAS BEEN FOR 7.5

## 2024-12-09 ENCOUNTER — TELEPHONE (OUTPATIENT)
Dept: INTERNAL MEDICINE | Facility: CLINIC | Age: 49
End: 2024-12-09
Payer: COMMERCIAL

## 2024-12-09 RX ORDER — TIRZEPATIDE 5 MG/.5ML
INJECTION, SOLUTION SUBCUTANEOUS
Qty: 2 ML | Refills: 0 | OUTPATIENT
Start: 2024-12-09

## 2024-12-09 NOTE — TELEPHONE ENCOUNTER
The original prescription was discontinued on 10/2/2024 by Lynn Weber MD.   Rx Refill Note  Requested Prescriptions     Refused Prescriptions Disp Refills    Mounjaro 5 MG/0.5ML solution auto-injector [Pharmacy Med Name: MOUNJARO INJ 5MG/0.5] 2 mL 0     Sig: INJECT 0.5 ML UNDER THE SKIN INTO THE APPROPRIATE AREA AS DIRECTED 1 (ONE) TIME PER WEEK.     Refused By: MANINDER ROE     Reason for Refusal: Refill not appropriate      Last office visit with prescribing clinician: Visit date not found   Last telemedicine visit with prescribing clinician: 10/2/2024   Next office visit with prescribing clinician: 12/9/2024                         Would you like a call back once the refill request has been completed: [] Yes [] No    If the office needs to give you a call back, can they leave a voicemail: [] Yes [] No    Maninder Roe MA  12/09/24, 16:46 EST

## 2024-12-10 ENCOUNTER — OFFICE VISIT (OUTPATIENT)
Dept: CARDIOLOGY | Facility: CLINIC | Age: 49
End: 2024-12-10
Payer: COMMERCIAL

## 2024-12-10 VITALS
OXYGEN SATURATION: 96 % | BODY MASS INDEX: 27.25 KG/M2 | HEIGHT: 73 IN | HEART RATE: 77 BPM | WEIGHT: 205.6 LBS | SYSTOLIC BLOOD PRESSURE: 100 MMHG | DIASTOLIC BLOOD PRESSURE: 76 MMHG

## 2024-12-10 DIAGNOSIS — I10 ESSENTIAL HYPERTENSION: ICD-10-CM

## 2024-12-10 DIAGNOSIS — I25.10 CORONARY ARTERY DISEASE INVOLVING NATIVE CORONARY ARTERY OF NATIVE HEART WITHOUT ANGINA PECTORIS: Primary | ICD-10-CM

## 2024-12-10 DIAGNOSIS — E83.110 HEREDITARY HEMOCHROMATOSIS: ICD-10-CM

## 2024-12-10 DIAGNOSIS — E78.2 MIXED HYPERLIPIDEMIA: ICD-10-CM

## 2024-12-10 PROCEDURE — 93000 ELECTROCARDIOGRAM COMPLETE: CPT | Performed by: INTERNAL MEDICINE

## 2024-12-10 PROCEDURE — 99214 OFFICE O/P EST MOD 30 MIN: CPT | Performed by: INTERNAL MEDICINE

## 2024-12-10 RX ORDER — CARVEDILOL 6.25 MG/1
6.25 TABLET ORAL 2 TIMES DAILY WITH MEALS
Qty: 180 TABLET | Refills: 3 | Status: SHIPPED | OUTPATIENT
Start: 2024-12-10

## 2024-12-10 NOTE — TELEPHONE ENCOUNTER
Pt picked Mounjaro up yesterday. Will get labs done at hospital tomorrow and then schedule f/u with Dr. Weber

## 2024-12-10 NOTE — PROGRESS NOTES
Date of Office Visit: 12/10/24  Encounter Provider: Jesse Agudelo MD  Place of Service: [facility]  Patient Name: Arnav Celaya  :1975    Chief Complaint   Patient presents with    Follow-up   :     HPI:     Mr. Celaya is 49 y.o. and presents today in follow up. I have reviewed prior notes and there are no changes except for any new updates described below. I have also reviewed any information entered into the medical record by the patient or by ancillary staff.     He presented in 2022 with an inferior STEMI. He had an ulcerated 95% lesion in the mid right coronary artery along with distal embolization of the posterolateral branch.  He underwent placement of a 4 x 18 mm Xience drug-eluting stent to the right coronary artery, postdilated to 5 mmHg. The distal vessel was treated with several rounds of intracoronary nitroglycerin, adenosine, and nitroprusside, and he was treated with eptifibatide for 24 hours to improve flow through the VIRAL.  An echo revealed normal left ventricular systolic function, ejection fraction 55%; there was inferior hypokinesis and mild to moderate mitral regurgitation.  He was discharged on aspirin, prasugrel, atorvastatin, carvedilol, losartan, and spironolactone.     He really changed his lifestyle after that and his weight and blood pressure immediately improved. A follow up echocardiogram showed normalization of LV wall motion and was otherwise normal.     For the most part, he feels well! He has no angina, dyspnea, palpitations, syncope, or edema. He's been having some headaches and lightheadedness. He's lost almost 30 pounds in a year.    Past Medical History:   Diagnosis Date    CAD (coronary artery disease) 2022    Inferior STEMI 2022: 95% mid RCA with distal embolization or rPLA, s/p 4x18mm MICHELLE post-dilated to 5mm.    Gastroesophageal reflux disease 2019    Hemochromatosis     Mild intermittent asthma  without complication 2018    Type 2 diabetes mellitus        Past Surgical History:   Procedure Laterality Date    CARDIAC CATHETERIZATION N/A 2022    Procedure: Left Heart Cath;  Surgeon: Donna Mendoza MD;  Location:  [facility];  Service: Cardiovascular;  Laterality: N/A;    CARDIAC CATHETERIZATION N/A 2022    Procedure: Coronary angiography;  Surgeon: Donna Mendoza MD;  Location:  [facility];  Service: Cardiovascular;  Laterality: N/A;    CARDIAC CATHETERIZATION N/A 2022    Procedure: Left ventriculography;  Surgeon: Donna Mendoza MD;  Location:  [facility];  Service: Cardiovascular;  Laterality: N/A;    CARDIAC CATHETERIZATION  2022    Procedure: STENT MICHELLE - CORONARY;  Surgeon: Donna Mendoza MD;  Location:  [facility];  Service: Cardiovascular;;    CARDIAC CATHETERIZATION N/A 2022    Procedure: Optical Coherent Tomography;  Surgeon: Donna Mendoza MD;  Location:  [facility];  Service: Cardiovascular;  Laterality: N/A;    CARDIAC CATHETERIZATION  2022    Procedure: Percutaneous Manual Thrombectomy;  Surgeon: Donna Mendoza MD;  Location:  [facility];  Service: Cardiovascular;;    CARDIAC CATHETERIZATION N/A 2022    Procedure: Percutaneous Coronary Intervention;  Surgeon: Donna Mendoza MD;  Location:  [facility];  Service: Cardiovascular;  Laterality: N/A;       Social History     Socioeconomic History    Marital status:    Tobacco Use    Smoking status: Former     Current packs/day: 0.00     Average packs/day: 0.3 packs/day for 10.0 years (2.5 ttl pk-yrs)     Types: Cigarettes     Start date: 2009     Quit date: 2019     Years since quittin.9    Smokeless tobacco: Never    Tobacco comments:     Was on and off smoking until around 2019   Vaping Use    Vaping status: Some Days    Substances: Nicotine    Devices:  Disposable   Substance and Sexual Activity    Alcohol use: Yes     Alcohol/week: 1.0 standard drink of alcohol     Types: 1 Cans of beer per week     Comment: socially    Drug use: No    Sexual activity: Defer       Family History   Problem Relation Age of Onset    Heart attack Paternal Aunt     Heart attack Paternal Grandfather        Review of Systems   All other systems reviewed and are negative.      Allergies   Allergen Reactions    Lisinopril Cough and Hives    Omeprazole Other (See Comments), Hives and Unknown (See Comments)     Poss rash  Poss rash  Poss rash         Current Outpatient Medications:     atorvastatin (LIPITOR) 40 MG tablet, Take 1 tablet by mouth Every Night., Disp: 90 tablet, Rfl: 1    carvedilol (COREG) 12.5 MG tablet, Take 1 tablet by mouth 2 (Two) Times a Day With Meals., Disp: 540 tablet, Rfl: 0    clopidogrel (PLAVIX) 75 MG tablet, Take 1 tablet by mouth Daily., Disp: 90 tablet, Rfl: 3    Continuous Glucose Sensor (FreeStyle Justyna 3 Sensor) misc, Use 1 each Every 14 (Fourteen) Days., Disp: 6 each, Rfl: 3    fluticasone (Flonase) 50 MCG/ACT nasal spray, 2 sprays into the nostril(s) as directed by provider Daily., Disp: 48 g, Rfl: 3    glucose monitor monitoring kit, 1 each As Needed (daily to check glucose)., Disp: 1 each, Rfl: 0    Lancet Devices misc, 1 each Daily As Needed (to check glucose)., Disp: 100 each, Rfl: 1    metFORMIN ER (GLUCOPHAGE-XR) 500 MG 24 hr tablet, Take 2 tablets by mouth 2 (Two) Times a Day., Disp: 180 tablet, Rfl: 1    nitroglycerin (NITROSTAT) 0.4 MG SL tablet, 1 under the tongue as needed for angina, may repeat q5mins for up three doses, Disp: 25 tablet, Rfl: 1    Tirzepatide 5 MG/0.5ML solution auto-injector, Inject 5 mg under the skin into the appropriate area as directed Every 7 (Seven) Days., Disp: 6 mL, Rfl: 3    True Metrix Blood Glucose Test test strip, USE TO TEST BLOOD GLUCOSE ONCE DAILY AS DIRECTED., Disp: 100 each, Rfl: 0    azithromycin (Zithromax)  "250 MG tablet, Take 2 tablets the first day, then 1 tablet daily for 4 days. (Patient not taking: Reported on 12/10/2024), Disp: 6 tablet, Rfl: 0    clopidogrel (Plavix) 75 MG tablet, Take 1 tablet by mouth Daily. (Patient not taking: Reported on 12/10/2024), Disp: 90 tablet, Rfl: 3    Scopolamine 1 MG/3DAYS patch, Place 1 patch on the skin as directed by provider Every 72 (Seventy-Two) Hours. (Patient not taking: Reported on 12/10/2024), Disp: 4 patch, Rfl: 0      Objective:     Vitals:    12/10/24 1108   BP: 100/76   Pulse: 77   SpO2: 96%   Weight: 93.3 kg (205 lb 9.6 oz)   Height: 185.4 cm (73\")     Body mass index is 27.13 kg/m².    Vitals reviewed.   Constitutional:       Appearance: Healthy appearance. Well-developed and not in distress.   Eyes:      Conjunctiva/sclera: Conjunctivae normal.   HENT:      Head: Normocephalic.      Nose: Nose normal.   Neck:      Vascular: No JVD. JVD normal.      Lymphadenopathy: No cervical adenopathy.   Pulmonary:      Effort: Pulmonary effort is normal.      Breath sounds: Normal breath sounds.   Cardiovascular:      Normal rate. Regular rhythm.      Murmurs: There is no murmur.   Pulses:     Intact distal pulses.   Edema:     Peripheral edema absent.   Abdominal:      Palpations: Abdomen is soft.      Tenderness: There is no abdominal tenderness.   Musculoskeletal: Normal range of motion.      Cervical back: Normal range of motion. Skin:     General: Skin is warm and dry.   Neurological:      General: No focal deficit present.      Mental Status: Oriented to person, place, and time.      Cranial Nerves: No cranial nerve deficit.   Psychiatric:         Behavior: Behavior normal.         Thought Content: Thought content normal.         Judgment: Judgment normal.           ECG 12 Lead    Date/Time: 12/10/2024 11:13 AM  Performed by: Jesse Agudelo MD    Authorized by: Jesse Agudelo MD  Comparison: compared with previous ECG   Similar to previous ECG  Rhythm: sinus rhythm  Q " waves: III, aVF and II    ST Segments: ST segments normal  T Waves: T waves normal  Other findings: low voltage    Clinical impression: abnormal EKG       EKG --   I have personally reviewed EKG on 02/14/2023 and my interpretation of the tracing is as follows: NSR, 1st deg AVB, inferolateral Q waves, no change        Assessment:       Diagnosis Plan   1. Coronary artery disease involving native coronary artery of native heart without angina pectoris        2. Mixed hyperlipidemia        3. Essential hypertension        4. Hereditary hemochromatosis             Plan:       Mr Celaya had a STEMI in August 2022 due to plaque rupture in the RCA. He's doing great. He's on clopidogrel, carvedilol, and atorvastatin. His BP is a little low and he's symptomatic; I'm going to decrease carvedilol to 6.25mg BID.     His initial echo showed inferolateral hypokinesis; this resolved on a follow up study.      He had an echo in 2023 that looked fine; given his hemochromatosis, he should have an echo every other year. He gets phlebotomized twice yearly and his ferritin remains in the low 100s.  Getting an echocardiogram performed at [facility] is very expensive for him.  [facility]  I asked him to inquire there at his next appointment to see if they would be willing to order one at the [facility]; this will be much less expensive for him.    Sincerely,       Jesse Agudelo MD

## 2024-12-11 ENCOUNTER — LAB (OUTPATIENT)
Dept: LAB | Facility: HOSPITAL | Age: 49
End: 2024-12-11
Payer: COMMERCIAL

## 2024-12-11 PROCEDURE — 82570 ASSAY OF URINE CREATININE: CPT | Performed by: INTERNAL MEDICINE

## 2024-12-11 PROCEDURE — 82043 UR ALBUMIN QUANTITATIVE: CPT | Performed by: INTERNAL MEDICINE

## 2024-12-11 PROCEDURE — 84439 ASSAY OF FREE THYROXINE: CPT | Performed by: INTERNAL MEDICINE

## 2024-12-11 PROCEDURE — 83036 HEMOGLOBIN GLYCOSYLATED A1C: CPT | Performed by: INTERNAL MEDICINE

## 2024-12-11 PROCEDURE — 80050 GENERAL HEALTH PANEL: CPT | Performed by: INTERNAL MEDICINE

## 2024-12-11 PROCEDURE — 80061 LIPID PANEL: CPT | Performed by: INTERNAL MEDICINE

## 2025-02-17 RX ORDER — METFORMIN HYDROCHLORIDE 500 MG/1
1000 TABLET, EXTENDED RELEASE ORAL 2 TIMES DAILY
Qty: 180 TABLET | Refills: 1 | Status: SHIPPED | OUTPATIENT
Start: 2025-02-17

## 2025-02-17 NOTE — TELEPHONE ENCOUNTER
Rx Refill Note  Requested Prescriptions     Pending Prescriptions Disp Refills    metFORMIN ER (GLUCOPHAGE-XR) 500 MG 24 hr tablet [Pharmacy Med Name: METFORMIN HYDROCHLORIDE  MG TABLET] 180 tablet 1     Sig: TAKE 2 TABLETS BY MOUTH 2 (TWO) TIMES A DAY.      Last office visit with prescribing clinician: Visit date not found   Last telemedicine visit with prescribing clinician: 10/2/2024   Next office visit with prescribing clinician: Visit date not found                         Would you like a call back once the refill request has been completed: [] Yes [] No    If the office needs to give you a call back, can they leave a voicemail: [] Yes [] No    Ira Allen MA  02/17/25, 10:34 EST

## 2025-05-01 ENCOUNTER — TELEPHONE (OUTPATIENT)
Dept: INTERNAL MEDICINE | Facility: CLINIC | Age: 50
End: 2025-05-01
Payer: COMMERCIAL

## 2025-05-01 DIAGNOSIS — E11.65 TYPE 2 DIABETES MELLITUS WITH HYPERGLYCEMIA, WITHOUT LONG-TERM CURRENT USE OF INSULIN: Primary | ICD-10-CM

## 2025-05-01 DIAGNOSIS — I10 ESSENTIAL HYPERTENSION: ICD-10-CM

## 2025-05-01 DIAGNOSIS — I25.10 CORONARY ARTERY DISEASE INVOLVING NATIVE CORONARY ARTERY OF NATIVE HEART WITHOUT ANGINA PECTORIS: ICD-10-CM

## 2025-05-01 DIAGNOSIS — E78.2 MIXED HYPERLIPIDEMIA: ICD-10-CM

## 2025-05-01 NOTE — TELEPHONE ENCOUNTER
Caller: Arnav Celaya    Relationship: Self    Best call back number: [phone]    What orders are you requesting (i.e. lab or imaging): LAB    Additional notes: PATIENT REQUESTS LAB ORDERS. PATIENT STATES HE WILL MAKE AN APPOINTMENT WITH PROVIDER AFTER LABS ARE DONE.  PLEASE CALL WHEN ORDERS HAVE BEEN ENTERED

## 2025-05-06 ENCOUNTER — LAB (OUTPATIENT)
Dept: LAB | Facility: HOSPITAL | Age: 50
End: 2025-05-06
Payer: COMMERCIAL

## 2025-05-06 LAB
ALBUMIN SERPL-MCNC: 4.4 G/DL (ref 3.5–5.2)
ALBUMIN UR-MCNC: 1.5 MG/DL
ALBUMIN/GLOB SERPL: 1.7 G/DL
ALP SERPL-CCNC: 49 U/L (ref 39–117)
ALT SERPL W P-5'-P-CCNC: 15 U/L (ref 1–41)
ANION GAP SERPL CALCULATED.3IONS-SCNC: 8 MMOL/L (ref 5–15)
AST SERPL-CCNC: 13 U/L (ref 1–40)
BASOPHILS # BLD AUTO: 0.04 10*3/MM3 (ref 0–0.2)
BASOPHILS NFR BLD AUTO: 0.8 % (ref 0–1.5)
BILIRUB SERPL-MCNC: 0.6 MG/DL (ref 0–1.2)
BUN SERPL-MCNC: 13 MG/DL (ref 6–20)
BUN/CREAT SERPL: 14 (ref 7–25)
CALCIUM SPEC-SCNC: 10.1 MG/DL (ref 8.6–10.5)
CHLORIDE SERPL-SCNC: 102 MMOL/L (ref 98–107)
CHOLEST SERPL-MCNC: 101 MG/DL (ref 0–200)
CO2 SERPL-SCNC: 26 MMOL/L (ref 22–29)
CREAT SERPL-MCNC: 0.93 MG/DL (ref 0.76–1.27)
CREAT UR-MCNC: 317.9 MG/DL
DEPRECATED RDW RBC AUTO: 43.2 FL (ref 37–54)
EGFRCR SERPLBLD CKD-EPI 2021: 100 ML/MIN/1.73
EOSINOPHIL # BLD AUTO: 0.13 10*3/MM3 (ref 0–0.4)
EOSINOPHIL NFR BLD AUTO: 2.5 % (ref 0.3–6.2)
ERYTHROCYTE [DISTWIDTH] IN BLOOD BY AUTOMATED COUNT: 12.9 % (ref 12.3–15.4)
GLOBULIN UR ELPH-MCNC: 2.6 GM/DL
GLUCOSE SERPL-MCNC: 87 MG/DL (ref 65–99)
HBA1C MFR BLD: 4.8 % (ref 4.8–5.6)
HCT VFR BLD AUTO: 47.8 % (ref 37.5–51)
HDLC SERPL-MCNC: 33 MG/DL (ref 40–60)
HGB BLD-MCNC: 16.7 G/DL (ref 13–17.7)
IMM GRANULOCYTES # BLD AUTO: 0.02 10*3/MM3 (ref 0–0.05)
IMM GRANULOCYTES NFR BLD AUTO: 0.4 % (ref 0–0.5)
LDLC SERPL CALC-MCNC: 47 MG/DL (ref 0–100)
LDLC/HDLC SERPL: 1.39 {RATIO}
LYMPHOCYTES # BLD AUTO: 1.68 10*3/MM3 (ref 0.7–3.1)
LYMPHOCYTES NFR BLD AUTO: 32.9 % (ref 19.6–45.3)
MCH RBC QN AUTO: 32.3 PG (ref 26.6–33)
MCHC RBC AUTO-ENTMCNC: 34.9 G/DL (ref 31.5–35.7)
MCV RBC AUTO: 92.5 FL (ref 79–97)
MICROALBUMIN/CREAT UR: 4.7 MG/G (ref 0–29)
MONOCYTES # BLD AUTO: 0.44 10*3/MM3 (ref 0.1–0.9)
MONOCYTES NFR BLD AUTO: 8.6 % (ref 5–12)
NEUTROPHILS NFR BLD AUTO: 2.8 10*3/MM3 (ref 1.7–7)
NEUTROPHILS NFR BLD AUTO: 54.8 % (ref 42.7–76)
NRBC BLD AUTO-RTO: 0 /100 WBC (ref 0–0.2)
PLATELET # BLD AUTO: 333 10*3/MM3 (ref 140–450)
PMV BLD AUTO: 9 FL (ref 6–12)
POTASSIUM SERPL-SCNC: 4 MMOL/L (ref 3.5–5.2)
PROT SERPL-MCNC: 7 G/DL (ref 6–8.5)
RBC # BLD AUTO: 5.17 10*6/MM3 (ref 4.14–5.8)
SODIUM SERPL-SCNC: 136 MMOL/L (ref 136–145)
T4 FREE SERPL-MCNC: 1.33 NG/DL (ref 0.92–1.68)
TRIGL SERPL-MCNC: 111 MG/DL (ref 0–150)
TSH SERPL DL<=0.05 MIU/L-ACNC: 2.53 UIU/ML (ref 0.27–4.2)
VLDLC SERPL-MCNC: 21 MG/DL (ref 5–40)
WBC NRBC COR # BLD AUTO: 5.11 10*3/MM3 (ref 3.4–10.8)

## 2025-05-06 PROCEDURE — 82570 ASSAY OF URINE CREATININE: CPT | Performed by: INTERNAL MEDICINE

## 2025-05-06 PROCEDURE — 83036 HEMOGLOBIN GLYCOSYLATED A1C: CPT | Performed by: INTERNAL MEDICINE

## 2025-05-06 PROCEDURE — 80050 GENERAL HEALTH PANEL: CPT | Performed by: INTERNAL MEDICINE

## 2025-05-06 PROCEDURE — 80061 LIPID PANEL: CPT | Performed by: INTERNAL MEDICINE

## 2025-05-06 PROCEDURE — 84439 ASSAY OF FREE THYROXINE: CPT | Performed by: INTERNAL MEDICINE

## 2025-05-06 PROCEDURE — 82043 UR ALBUMIN QUANTITATIVE: CPT | Performed by: INTERNAL MEDICINE

## 2025-05-06 PROCEDURE — 84403 ASSAY OF TOTAL TESTOSTERONE: CPT | Performed by: INTERNAL MEDICINE

## 2025-05-07 ENCOUNTER — TELEMEDICINE (OUTPATIENT)
Dept: INTERNAL MEDICINE | Facility: CLINIC | Age: 50
End: 2025-05-07
Payer: COMMERCIAL

## 2025-05-07 VITALS
SYSTOLIC BLOOD PRESSURE: 126 MMHG | WEIGHT: 189 LBS | HEIGHT: 73 IN | DIASTOLIC BLOOD PRESSURE: 96 MMHG | BODY MASS INDEX: 25.05 KG/M2

## 2025-05-07 DIAGNOSIS — I25.10 CORONARY ARTERY DISEASE INVOLVING NATIVE CORONARY ARTERY OF NATIVE HEART WITHOUT ANGINA PECTORIS: ICD-10-CM

## 2025-05-07 DIAGNOSIS — E11.65 TYPE 2 DIABETES MELLITUS WITH HYPERGLYCEMIA, WITHOUT LONG-TERM CURRENT USE OF INSULIN: Primary | ICD-10-CM

## 2025-05-07 DIAGNOSIS — I10 ESSENTIAL HYPERTENSION: ICD-10-CM

## 2025-05-07 DIAGNOSIS — R53.82 CHRONIC FATIGUE: ICD-10-CM

## 2025-05-07 DIAGNOSIS — E11.65 UNCONTROLLED TYPE 2 DIABETES MELLITUS WITH HYPERGLYCEMIA: ICD-10-CM

## 2025-05-07 DIAGNOSIS — E78.2 MIXED HYPERLIPIDEMIA: ICD-10-CM

## 2025-05-07 LAB — TESTOST SERPL-MCNC: 576 NG/DL (ref 193–740)

## 2025-05-07 PROCEDURE — 99214 OFFICE O/P EST MOD 30 MIN: CPT | Performed by: INTERNAL MEDICINE

## 2025-05-07 RX ORDER — NITROGLYCERIN 0.4 MG/1
TABLET SUBLINGUAL
Qty: 25 TABLET | Refills: 1 | Status: SHIPPED | OUTPATIENT
Start: 2025-05-07

## 2025-05-07 NOTE — PROGRESS NOTES
Arnav Celaya is a 49 y.o. male, who presents with a chief complaint of No chief complaint on file.          HPI   This visit has been scheduled as a telehealth visit to comply with patient safety concerns in accordance with CDC recommendations. This was an audio and video enabled telemedicine encounter.    You have chosen to receive care through a televisit visit. Do you consent to use a televisit visit for your medical care today? Yes    Pt is at work and I am in the office    T2DM  - A1c 8.9->5.0->4.8  weight down and A1c dramatically improved with mounjaro.  He is also on metformin.  He has lost 40-50 lbs in the past 9 months and is now at 189lbs.      CAD - on plavix, carvedilol, and statin    HLD - on statin          The following portions of the patient's history were reviewed and updated as appropriate: allergies, current medications, past family history, past medical history, past social history, past surgical history and problem list.    Allergies: Lisinopril and Omeprazole    Review of Systems   Constitutional: Negative.    HENT: Negative.     Eyes: Negative.    Respiratory: Negative.     Cardiovascular: Negative.    Gastrointestinal: Negative.    Endocrine: Negative.    Genitourinary: Negative.    Musculoskeletal: Negative.    Skin: Negative.    Allergic/Immunologic: Negative.    Neurological: Negative.    Hematological: Negative.    Psychiatric/Behavioral: Negative.     All other systems reviewed and are negative.            Wt Readings from Last 3 Encounters:   05/07/25 85.7 kg (189 lb)   12/10/24 93.3 kg (205 lb 9.6 oz)   10/02/24 93.9 kg (207 lb)     Temp Readings from Last 3 Encounters:   02/21/24 99.3 °F (37.4 °C) (Infrared)   10/06/23 98 °F (36.7 °C) (Infrared)   09/18/23 98 °F (36.7 °C) (Infrared)     BP Readings from Last 3 Encounters:   05/07/25 126/96   12/10/24 100/76   02/21/24 116/82     Pulse Readings from Last 3 Encounters:   12/10/24 77   02/21/24 (!) 121   10/10/23 67     Body  mass index is 24.94 kg/m².  SpO2 Readings from Last 3 Encounters:   12/10/24 96%   02/21/24 97%   10/10/23 97%          Physical Exam  Vitals and nursing note reviewed.   Constitutional:       General: He is not in acute distress.     Appearance: He is well-developed.   HENT:      Head: Normocephalic and atraumatic.      Right Ear: External ear normal.      Left Ear: External ear normal.      Nose: Nose normal.   Eyes:      Conjunctiva/sclera: Conjunctivae normal.      Pupils: Pupils are equal, round, and reactive to light.   Cardiovascular:      Rate and Rhythm: Normal rate and regular rhythm.      Heart sounds: Normal heart sounds.   Pulmonary:      Effort: Pulmonary effort is normal. No respiratory distress.      Breath sounds: Normal breath sounds. No wheezing.   Musculoskeletal:         General: Normal range of motion.      Cervical back: Normal range of motion and neck supple.      Comments: Normal gait   Skin:     General: Skin is warm and dry.   Neurological:      Mental Status: He is alert and oriented to person, place, and time.   Psychiatric:         Behavior: Behavior normal.         Thought Content: Thought content normal.         Judgment: Judgment normal.         Results for orders placed or performed in visit on 05/01/25   Comprehensive Metabolic Panel    Collection Time: 05/06/25  8:27 AM    Specimen: Blood   Result Value Ref Range    Glucose 87 65 - 99 mg/dL    BUN 13 6 - 20 mg/dL    Creatinine 0.93 0.76 - 1.27 mg/dL    Sodium 136 136 - 145 mmol/L    Potassium 4.0 3.5 - 5.2 mmol/L    Chloride 102 98 - 107 mmol/L    CO2 26.0 22.0 - 29.0 mmol/L    Calcium 10.1 8.6 - 10.5 mg/dL    Total Protein 7.0 6.0 - 8.5 g/dL    Albumin 4.4 3.5 - 5.2 g/dL    ALT (SGPT) 15 1 - 41 U/L    AST (SGOT) 13 1 - 40 U/L    Alkaline Phosphatase 49 39 - 117 U/L    Total Bilirubin 0.6 0.0 - 1.2 mg/dL    Globulin 2.6 gm/dL    A/G Ratio 1.7 g/dL    BUN/Creatinine Ratio 14.0 7.0 - 25.0    Anion Gap 8.0 5.0 - 15.0 mmol/L    eGFR  100.0 >60.0 mL/min/1.73   Hemoglobin A1c    Collection Time: 05/06/25  8:27 AM    Specimen: Blood   Result Value Ref Range    Hemoglobin A1C 4.80 4.80 - 5.60 %   Lipid Panel With LDL / HDL Ratio    Collection Time: 05/06/25  8:27 AM    Specimen: Blood   Result Value Ref Range    Total Cholesterol 101 0 - 200 mg/dL    Triglycerides 111 0 - 150 mg/dL    HDL Cholesterol 33 (L) 40 - 60 mg/dL    LDL Cholesterol  47 0 - 100 mg/dL    VLDL Cholesterol 21 5 - 40 mg/dL    LDL/HDL Ratio 1.39    Microalbumin / Creatinine Urine Ratio - Urine, Clean Catch    Collection Time: 05/06/25  8:27 AM    Specimen: Urine, Clean Catch   Result Value Ref Range    Microalbumin/Creatinine Ratio 4.7 0.0 - 29.0 mg/g    Creatinine, Urine 317.9 mg/dL    Microalbumin, Urine 1.5 mg/dL   TSH    Collection Time: 05/06/25  8:27 AM    Specimen: Blood   Result Value Ref Range    TSH 2.530 0.270 - 4.200 uIU/mL   T4, Free    Collection Time: 05/06/25  8:27 AM    Specimen: Blood   Result Value Ref Range    Free T4 1.33 0.92 - 1.68 ng/dL   CBC Auto Differential    Collection Time: 05/06/25  8:27 AM    Specimen: Blood   Result Value Ref Range    WBC 5.11 3.40 - 10.80 10*3/mm3    RBC 5.17 4.14 - 5.80 10*6/mm3    Hemoglobin 16.7 13.0 - 17.7 g/dL    Hematocrit 47.8 37.5 - 51.0 %    MCV 92.5 79.0 - 97.0 fL    MCH 32.3 26.6 - 33.0 pg    MCHC 34.9 31.5 - 35.7 g/dL    RDW 12.9 12.3 - 15.4 %    RDW-SD 43.2 37.0 - 54.0 fl    MPV 9.0 6.0 - 12.0 fL    Platelets 333 140 - 450 10*3/mm3    Neutrophil % 54.8 42.7 - 76.0 %    Lymphocyte % 32.9 19.6 - 45.3 %    Monocyte % 8.6 5.0 - 12.0 %    Eosinophil % 2.5 0.3 - 6.2 %    Basophil % 0.8 0.0 - 1.5 %    Immature Grans % 0.4 0.0 - 0.5 %    Neutrophils, Absolute 2.80 1.70 - 7.00 10*3/mm3    Lymphocytes, Absolute 1.68 0.70 - 3.10 10*3/mm3    Monocytes, Absolute 0.44 0.10 - 0.90 10*3/mm3    Eosinophils, Absolute 0.13 0.00 - 0.40 10*3/mm3    Basophils, Absolute 0.04 0.00 - 0.20 10*3/mm3    Immature Grans, Absolute 0.02 0.00 - 0.05  10*3/mm3    nRBC 0.0 0.0 - 0.2 /100 WBC     Result Review :                  Assessment and Plan    Diagnoses and all orders for this visit:    1. Type 2 diabetes mellitus with hyperglycemia, without long-term current use of insulin (Primary)  -     Tirzepatide 2.5 MG/0.5ML solution auto-injector; Inject 2.5 mg under the skin into the appropriate area as directed 1 (One) Time Per Week.  Dispense: 6 mL; Refill: 1    2. Coronary artery disease involving native coronary artery of native heart without angina pectoris - cont plavix, atorvastain    3. Essential hypertension    4. Mixed hyperlipidemia    5. Uncontrolled type 2 diabetes mellitus with hyperglycemia       Cont other meds.  Ov/labs in 3 mo                Outpatient Medications Prior to Visit   Medication Sig Dispense Refill    atorvastatin (LIPITOR) 40 MG tablet Take 1 tablet by mouth Every Night. 90 tablet 1    azithromycin (Zithromax) 250 MG tablet Take 2 tablets the first day, then 1 tablet daily for 4 days. (Patient not taking: Reported on 12/10/2024) 6 tablet 0    carvedilol (COREG) 6.25 MG tablet Take 1 tablet by mouth 2 (Two) Times a Day With Meals. 180 tablet 3    clopidogrel (PLAVIX) 75 MG tablet Take 1 tablet by mouth Daily. 90 tablet 3    clopidogrel (Plavix) 75 MG tablet Take 1 tablet by mouth Daily. (Patient not taking: Reported on 12/10/2024) 90 tablet 3    Continuous Glucose Sensor (FreeStyle Justyna 3 Sensor) misc Use 1 each Every 14 (Fourteen) Days. 6 each 3    fluticasone (Flonase) 50 MCG/ACT nasal spray 2 sprays into the nostril(s) as directed by provider Daily. 48 g 3    glucose monitor monitoring kit 1 each As Needed (daily to check glucose). 1 each 0    Lancet Devices misc 1 each Daily As Needed (to check glucose). 100 each 1    Scopolamine 1 MG/3DAYS patch Place 1 patch on the skin as directed by provider Every 72 (Seventy-Two) Hours. (Patient not taking: Reported on 12/10/2024) 4 patch 0    True Metrix Blood Glucose Test test strip USE TO  TEST BLOOD GLUCOSE ONCE DAILY AS DIRECTED. 100 each 0    metFORMIN ER (GLUCOPHAGE-XR) 500 MG 24 hr tablet TAKE 2 TABLETS BY MOUTH 2 (TWO) TIMES A DAY. 180 tablet 1    nitroglycerin (NITROSTAT) 0.4 MG SL tablet 1 under the tongue as needed for angina, may repeat q5mins for up three doses 25 tablet 1    Tirzepatide 5 MG/0.5ML solution auto-injector Inject 5 mg under the skin into the appropriate area as directed Every 7 (Seven) Days. 6 mL 3     No facility-administered medications prior to visit.     New Medications Ordered This Visit   Medications    Tirzepatide 2.5 MG/0.5ML solution auto-injector     Sig: Inject 2.5 mg under the skin into the appropriate area as directed 1 (One) Time Per Week.     Dispense:  6 mL     Refill:  1    nitroglycerin (NITROSTAT) 0.4 MG SL tablet     Si under the tongue as needed for angina, may repeat q5mins for up three doses     Dispense:  25 tablet     Refill:  1     [unfilled]  Medications Discontinued During This Encounter   Medication Reason    metFORMIN ER (GLUCOPHAGE-XR) 500 MG 24 hr tablet *Therapy completed    Tirzepatide 5 MG/0.5ML solution auto-injector *Therapy completed    nitroglycerin (NITROSTAT) 0.4 MG SL tablet *Therapy completed         No follow-ups on file.    Patient was given instructions and counseling regarding her condition or for health maintenance advice. Please see specific information pulled into the AVS if appropriate.

## 2025-07-14 ENCOUNTER — TELEPHONE (OUTPATIENT)
Dept: INTERNAL MEDICINE | Facility: CLINIC | Age: 50
End: 2025-07-14

## 2025-07-14 DIAGNOSIS — E78.2 MIXED HYPERLIPIDEMIA: ICD-10-CM

## 2025-07-14 DIAGNOSIS — E11.65 TYPE 2 DIABETES MELLITUS WITH HYPERGLYCEMIA, WITHOUT LONG-TERM CURRENT USE OF INSULIN: Primary | ICD-10-CM

## 2025-07-14 DIAGNOSIS — I10 ESSENTIAL HYPERTENSION: ICD-10-CM

## 2025-07-14 NOTE — TELEPHONE ENCOUNTER
"  Caller: Arnav Celaya \"Franc\"    Relationship: Self    Best call back number: [phone]     What orders are you requesting (i.e. lab or imaging): LABS    Additional notes: PATIENT ASKED IF DR. PHAN CAN PUT IN LAB ORDERS FOR HIM. PLEASE ADVISE.      "

## 2025-07-15 ENCOUNTER — OFFICE VISIT (OUTPATIENT)
Dept: INTERNAL MEDICINE | Facility: CLINIC | Age: 50
End: 2025-07-15
Payer: COMMERCIAL

## 2025-07-15 VITALS
DIASTOLIC BLOOD PRESSURE: 80 MMHG | TEMPERATURE: 98.6 F | WEIGHT: 182 LBS | HEART RATE: 63 BPM | OXYGEN SATURATION: 96 % | BODY MASS INDEX: 24.01 KG/M2 | SYSTOLIC BLOOD PRESSURE: 102 MMHG

## 2025-07-15 DIAGNOSIS — H53.2 DIPLOPIA: ICD-10-CM

## 2025-07-15 DIAGNOSIS — H53.8 BLURRY VISION: Primary | ICD-10-CM

## 2025-07-15 DIAGNOSIS — H53.40 VISUAL FIELD DEFECT: ICD-10-CM

## 2025-07-15 DIAGNOSIS — R51.9 NONINTRACTABLE HEADACHE, UNSPECIFIED CHRONICITY PATTERN, UNSPECIFIED HEADACHE TYPE: ICD-10-CM

## 2025-07-15 PROCEDURE — 99214 OFFICE O/P EST MOD 30 MIN: CPT | Performed by: INTERNAL MEDICINE

## 2025-07-15 NOTE — PROGRESS NOTES
Arnav Celaya is a 50 y.o. male, who presents with a chief complaint of   Chief Complaint   Patient presents with    Hospital Follow Up Visit     Discharged 7/14/2025           HPI   Pt here for f/u.  He had a headache on Friday and vision changes.  [facility] saw [facility] and they sent him to the ED for a stroke work up.  Imaging neg. The headache and vision changes are still present and are interfering with daily activities. No help with ubrelvy x 1.  No help with sudafed for congestion.         The following portions of the patient's history were reviewed and updated as appropriate: allergies, current medications, past family history, past medical history, past social history, past surgical history and problem list.    Allergies: Lisinopril and Omeprazole    Review of Systems   Constitutional: Negative.    HENT: Negative.     Eyes: Negative.    Respiratory: Negative.     Cardiovascular: Negative.    Gastrointestinal: Negative.    Endocrine: Negative.    Genitourinary: Negative.    Musculoskeletal: Negative.    Skin: Negative.    Allergic/Immunologic: Negative.    Neurological: Negative.    Hematological: Negative.    Psychiatric/Behavioral: Negative.     All other systems reviewed and are negative.            Wt Readings from Last 3 Encounters:   07/15/25 82.6 kg (182 lb)   06/12/25 83.9 kg (185 lb)   05/07/25 85.7 kg (189 lb)     Temp Readings from Last 3 Encounters:   07/15/25 98.6 °F (37 °C) (Infrared)   06/12/25 98.4 °F (36.9 °C) (Oral)   02/21/24 99.3 °F (37.4 °C) (Infrared)     BP Readings from Last 3 Encounters:   07/15/25 102/80   06/12/25 108/74   05/07/25 126/96     Pulse Readings from Last 3 Encounters:   07/15/25 63   06/12/25 88   12/10/24 77     Body mass index is 24.01 kg/m².  SpO2 Readings from Last 3 Encounters:   07/15/25 96%   06/12/25 98%   12/10/24 96%          Physical Exam  Vitals and nursing note reviewed.   Constitutional:       General: He is not in acute distress.     Appearance:  He is well-developed.   HENT:      Head: Normocephalic and atraumatic.      Right Ear: External ear normal.      Left Ear: External ear normal.      Nose: Nose normal.   Eyes:      Conjunctiva/sclera: Conjunctivae normal.      Pupils: Pupils are equal, round, and reactive to light.   Cardiovascular:      Rate and Rhythm: Normal rate and regular rhythm.      Heart sounds: Normal heart sounds.   Pulmonary:      Effort: Pulmonary effort is normal. No respiratory distress.      Breath sounds: Normal breath sounds. No wheezing.   Musculoskeletal:         General: Normal range of motion.      Cervical back: Normal range of motion and neck supple.      Comments: Normal gait   Skin:     General: Skin is warm and dry.   Neurological:      General: No focal deficit present.      Mental Status: He is alert and oriented to person, place, and time.   Psychiatric:         Mood and Affect: Mood normal.         Behavior: Behavior normal.         Thought Content: Thought content normal.         Judgment: Judgment normal.         Results for orders placed or performed during the hospital encounter of 06/12/25   Covid-19 + Flu A&B AG, Veritor ([id number])    Collection Time: 06/12/25  3:07 PM    Specimen: Swab   Result Value Ref Range    COVID19 Not Detected     Influenza A Antigen LIAM Not Detected     Influenza B Antigen LIAM Not Detected     Internal Control Passed     Lot Number [id number]     Expiration Date 1,[id number]      Result Review :3   The following data was reviewed by: Lynn Weber MD on 07/15/2025:  Common labs          8/21/2024    09:02 8/21/2024    09:05 12/11/2024    09:44 12/11/2024    09:48 5/6/2025    08:27   Common Labs   Glucose 196   95   87    BUN 17   18   13    Creatinine 1.01   1.24   0.93    Sodium 136   138   136    Potassium 4.2   4.3   4.0    Chloride 104   105   102    Calcium 9.4   9.8   10.1    Albumin 4.2   3.9   4.4    Total Bilirubin 0.5   0.7   0.6    Alkaline Phosphatase 51   52   49    AST  (SGOT) 18   14   13    ALT (SGPT) 29   14   15    WBC 5.78   5.31   5.11    Hemoglobin 16.2   17.0   16.7    Hematocrit 46.1   48.1   47.8    Platelets 299   346   333    Total Cholesterol 122   116   101    Triglycerides 159   131   111    HDL Cholesterol 29   29   33    LDL Cholesterol  65   63   47    Hemoglobin A1C 8.90   5.00   4.80    Microalbumin, Urine  <1.2   2.6  1.5      Data reviewed : Radiologic studies imaging below from [facility], Recent hospitalization notes ed notes, optometry notes and visual field studies, and labs from 7/14 at [facility]     MRI of the brain without contrast,     07/14/2025 at 1633 hours     HISTORY: diplopia, blurred vision.     TECHNIQUE: Multiplanar and multisequence imaging was obtained of the   brain on a 1.5 Deonna magnet. Images were obtained without IV   gadolinium.     COMPARISON: No     FINDINGS:     BRAIN: There is no evidence of infarct, hydrocephalus or mass-effect.   Mild periventricular T2 and FLAIR signal abnormality is demonstrated.   The ventricles are normal size and shape. Flow is demonstrated within   the vertebrobasilar and carotid arteries. The orbits and pituitary   gland are within normal limits. There is no evidence of Chiari   malformation or syrinx. The paranasal sinuses and mastoid air cells   are clear.     IMPRESSION: Negative MRI of the brain. There is no evidence of acute   intracranial abnormality.         Dictated by: Gerard Aguila M.D.         CT OF THE HEAD, CTA OF THE HEAD WITH 3-D RECONSTRUCTIONS, CTA OF THE   NECK     07/14/2025 at 1428 hours     HISTORY: Double vision, blurred vision.     TECHNIQUE: Multiple axial images were obtained of the head without IV   contrast. Sagittal and coronal reformats were performed. Also,   multiple axial images were obtained from the vertex to the thoracic   inlet following the power administration of IV contrast. This is the   CT angiogram protocol. Sagittal and coronal reformats were performed   of the head  and neck. Also, 3-D reconstructions of the head were   performed. The radiation dose reduction technique was used to obtain   ALARA for the exam.     COMPARISON: MRI of the brain performed on the same day.     FINDINGS:     CT OF THE HEAD: There is no evidence of acute intracranial hemorrhage,   hydrocephalus, or mass-effect. The brain parenchyma is unremarkable.   The ventricles are normal size and shape. The orbits and pituitary   fossa are within normal limits. The visualized portions of the   paranasal sinuses and mastoids are clear. The skull base and calvarium   are unremarkable.     IMPRESSION:   1. There is no evidence of acute intracranial abnormality.       CTA OF THE HEAD: No aneurysm, branch vessel occlusion, or   hemodynamically significant stenosis is demonstrated. The ophthalmic   arteries are unremarkable.     There is a patent anterior communicating artery. No posterior   communicating arteries are demonstrated.     The basilar artery is intact. The vertebral arteries are codominant.   The posterior cerebral arteries also are intact.     IMPRESSION:   1. Negative CTA of the head.       CTA OF THE NECK:     Right Common Carotid Artery: No hemodynamically significant stenosis   or dissection is demonstrated. The right internal carotid artery is   unremarkable.     Left Common Carotid Artery: No hemodynamically significant stenosis or   dissection is demonstrated. The left internal carotid artery is   unremarkable.     Vertebral Arteries: The vertebral arteries are codominant. No   hemodynamically significant stenosis or dissection is demonstrated.     Aortic Arch: The takeoffs of the great vessels are within normal   limits.     No mass or pathologically enlarged lymphadenopathy associated. The   mucosa is unremarkable.     The lung apices are clear.     IMPRESSION:   1. No evidence of hemodynamically significant stenosis or dissection       NASCET criteria was used to evaluate stenosis. Severe  stenosis is   considered to be 70-80%.       Dictated by: Gerard Aguila M.D.         Assessment and Plan    Diagnoses and all orders for this visit:    1. Blurry vision (Primary) - sx still present.  Refer for urgent ophthalmology evaluation  -     Ambulatory Referral to Ophthalmology    2. Diplopia  -     Ambulatory Referral to Ophthalmology    3. Visual field defect  -     Ambulatory Referral to Ophthalmology    4. Nonintractable headache, unspecified chronicity pattern, unspecified headache type  -     rimegepant sulfate ODT (Nurtec) 75 MG disintegrating tablet; Place 1 tablet under the tongue Daily.  Dispense: 2 tablet; Refill: 0         BMI is within normal parameters. No other follow-up for BMI required.             Outpatient Medications Prior to Visit   Medication Sig Dispense Refill    atorvastatin (LIPITOR) 40 MG tablet Take 1 tablet by mouth Every Night. 90 tablet 1    carvedilol (COREG) 6.25 MG tablet Take 1 tablet by mouth 2 (Two) Times a Day With Meals. 180 tablet 3    clopidogrel (PLAVIX) 75 MG tablet Take 1 tablet by mouth Daily. 90 tablet 3    Continuous Glucose Sensor (FreeStyle Justyna 3 Sensor) misc Use 1 each Every 14 (Fourteen) Days. 6 each 3    glucose monitor monitoring kit 1 each As Needed (daily to check glucose). 1 each 0    Lancet Devices misc 1 each Daily As Needed (to check glucose). 100 each 1    nitroglycerin (NITROSTAT) 0.4 MG SL tablet 1 under the tongue as needed for angina, may repeat q5mins for up three doses 25 tablet 1    Tirzepatide 2.5 MG/0.5ML solution auto-injector Inject 2.5 mg under the skin into the appropriate area as directed 1 (One) Time Per Week. 6 mL 1    True Metrix Blood Glucose Test test strip USE TO TEST BLOOD GLUCOSE ONCE DAILY AS DIRECTED. 100 each 0     No facility-administered medications prior to visit.     New Medications Ordered This Visit   Medications    rimegepant sulfate ODT (Nurtec) 75 MG disintegrating tablet     Sig: Place 1 tablet under the  tongue Daily.     Dispense:  2 tablet     Refill:  0     There are no discontinued medications.      No follow-ups on file.    Patient was given instructions and counseling regarding her condition or for health maintenance advice. Please see specific information pulled into the AVS if appropriate.

## 2025-08-11 ENCOUNTER — OFFICE VISIT (OUTPATIENT)
Dept: INTERNAL MEDICINE | Facility: CLINIC | Age: 50
End: 2025-08-11
Payer: COMMERCIAL

## 2025-08-11 VITALS
HEIGHT: 73 IN | WEIGHT: 196 LBS | SYSTOLIC BLOOD PRESSURE: 102 MMHG | BODY MASS INDEX: 25.98 KG/M2 | DIASTOLIC BLOOD PRESSURE: 78 MMHG | HEART RATE: 79 BPM | TEMPERATURE: 98.2 F | OXYGEN SATURATION: 97 %

## 2025-08-11 DIAGNOSIS — L02.414 ABSCESS OF LEFT ARM: Primary | ICD-10-CM

## 2025-08-11 DIAGNOSIS — L03.113 CELLULITIS OF RIGHT ELBOW: ICD-10-CM

## 2025-08-11 DIAGNOSIS — E11.65 TYPE 2 DIABETES MELLITUS WITH HYPERGLYCEMIA, WITHOUT LONG-TERM CURRENT USE OF INSULIN: ICD-10-CM

## 2025-08-11 RX ORDER — CEFTRIAXONE SODIUM 250 MG/1
1000 INJECTION, POWDER, FOR SOLUTION INTRAMUSCULAR; INTRAVENOUS EVERY 24 HOURS
Status: DISCONTINUED | OUTPATIENT
Start: 2025-08-11 | End: 2025-08-11

## 2025-08-11 RX ORDER — HYDROCODONE BITARTRATE AND ACETAMINOPHEN 5; 325 MG/1; MG/1
1 TABLET ORAL EVERY 4 HOURS PRN
Qty: 18 TABLET | Refills: 0 | Status: SHIPPED | OUTPATIENT
Start: 2025-08-11 | End: 2025-08-18 | Stop reason: SDUPTHER

## 2025-08-11 RX ORDER — SULFAMETHOXAZOLE AND TRIMETHOPRIM 800; 160 MG/1; MG/1
2 TABLET ORAL 2 TIMES DAILY
Qty: 40 TABLET | Refills: 0 | Status: SHIPPED | OUTPATIENT
Start: 2025-08-11 | End: 2025-08-14 | Stop reason: HOSPADM

## 2025-08-11 RX ORDER — SULFAMETHOXAZOLE AND TRIMETHOPRIM 800; 160 MG/1; MG/1
1 TABLET ORAL 2 TIMES DAILY
Qty: 20 TABLET | Refills: 0 | Status: SHIPPED | OUTPATIENT
Start: 2025-08-11 | End: 2025-08-11

## 2025-08-11 RX ORDER — SULFAMETHOXAZOLE AND TRIMETHOPRIM 800; 160 MG/1; MG/1
2 TABLET ORAL 2 TIMES DAILY
Qty: 20 TABLET | Refills: 0 | Status: SHIPPED | OUTPATIENT
Start: 2025-08-11 | End: 2025-08-11 | Stop reason: SDUPTHER

## 2025-08-12 ENCOUNTER — APPOINTMENT (OUTPATIENT)
Dept: CT IMAGING | Facility: HOSPITAL | Age: 50
End: 2025-08-12
Payer: COMMERCIAL

## 2025-08-12 ENCOUNTER — HOSPITAL ENCOUNTER (OUTPATIENT)
Facility: HOSPITAL | Age: 50
Setting detail: OBSERVATION
Discharge: HOME | End: 2025-08-14
Attending: EMERGENCY MEDICINE
Payer: COMMERCIAL

## 2025-08-12 ENCOUNTER — APPOINTMENT (OUTPATIENT)
Dept: GENERAL RADIOLOGY | Facility: HOSPITAL | Age: 50
End: 2025-08-12
Payer: COMMERCIAL

## 2025-08-12 DIAGNOSIS — Z78.9 FAILURE OF OUTPATIENT TREATMENT: ICD-10-CM

## 2025-08-12 DIAGNOSIS — L03.113 CELLULITIS OF RIGHT UPPER EXTREMITY: Primary | ICD-10-CM

## 2025-08-12 DIAGNOSIS — L02.91 ABSCESS: ICD-10-CM

## 2025-08-12 LAB
ALBUMIN SERPL-MCNC: 3.9 G/DL (ref 3.5–5.2)
ALBUMIN/GLOB SERPL: 1.4 G/DL
ALP SERPL-CCNC: 50 U/L (ref 39–117)
ALT SERPL W P-5'-P-CCNC: 22 U/L (ref 1–41)
ANION GAP SERPL CALCULATED.3IONS-SCNC: 9.4 MMOL/L (ref 5–15)
AST SERPL-CCNC: 41 U/L (ref 1–40)
BASOPHILS # BLD AUTO: 0.02 10*3/MM3 (ref 0–0.2)
BASOPHILS NFR BLD AUTO: 0.2 % (ref 0–1.5)
BILIRUB SERPL-MCNC: 0.3 MG/DL (ref 0–1.2)
BUN SERPL-MCNC: 19.7 MG/DL (ref 6–20)
BUN/CREAT SERPL: 18.1 (ref 7–25)
CALCIUM SPEC-SCNC: 9 MG/DL (ref 8.6–10.5)
CHLORIDE SERPL-SCNC: 104 MMOL/L (ref 98–107)
CO2 SERPL-SCNC: 24.6 MMOL/L (ref 22–29)
CREAT SERPL-MCNC: 1.09 MG/DL (ref 0.76–1.27)
CRP SERPL-MCNC: 6.5 MG/DL (ref 0–0.5)
D-LACTATE SERPL-SCNC: 0.5 MMOL/L (ref 0.5–2)
DEPRECATED RDW RBC AUTO: 45 FL (ref 37–54)
EGFRCR SERPLBLD CKD-EPI 2021: 82.2 ML/MIN/1.73
EOSINOPHIL # BLD AUTO: 0.19 10*3/MM3 (ref 0–0.4)
EOSINOPHIL NFR BLD AUTO: 2.2 % (ref 0.3–6.2)
ERYTHROCYTE [DISTWIDTH] IN BLOOD BY AUTOMATED COUNT: 12.6 % (ref 12.3–15.4)
ERYTHROCYTE [SEDIMENTATION RATE] IN BLOOD: 15 MM/HR (ref 0–20)
GLOBULIN UR ELPH-MCNC: 2.8 GM/DL
GLUCOSE BLDC GLUCOMTR-MCNC: 139 MG/DL (ref 70–130)
GLUCOSE SERPL-MCNC: 78 MG/DL (ref 65–99)
HCT VFR BLD AUTO: 42.2 % (ref 37.5–51)
HGB BLD-MCNC: 14.1 G/DL (ref 13–17.7)
IMM GRANULOCYTES # BLD AUTO: 0.01 10*3/MM3 (ref 0–0.05)
IMM GRANULOCYTES NFR BLD AUTO: 0.1 % (ref 0–0.5)
LYMPHOCYTES # BLD AUTO: 1.76 10*3/MM3 (ref 0.7–3.1)
LYMPHOCYTES NFR BLD AUTO: 20.4 % (ref 19.6–45.3)
MAGNESIUM SERPL-MCNC: 2 MG/DL (ref 1.6–2.6)
MCH RBC QN AUTO: 31.9 PG (ref 26.6–33)
MCHC RBC AUTO-ENTMCNC: 33.4 G/DL (ref 31.5–35.7)
MCV RBC AUTO: 95.5 FL (ref 79–97)
MONOCYTES # BLD AUTO: 1.15 10*3/MM3 (ref 0.1–0.9)
MONOCYTES NFR BLD AUTO: 13.4 % (ref 5–12)
NEUTROPHILS NFR BLD AUTO: 5.48 10*3/MM3 (ref 1.7–7)
NEUTROPHILS NFR BLD AUTO: 63.7 % (ref 42.7–76)
PLATELET # BLD AUTO: 235 10*3/MM3 (ref 140–450)
PMV BLD AUTO: 9.2 FL (ref 6–12)
POTASSIUM SERPL-SCNC: 4.2 MMOL/L (ref 3.5–5.2)
PROT SERPL-MCNC: 6.7 G/DL (ref 6–8.5)
RBC # BLD AUTO: 4.42 10*6/MM3 (ref 4.14–5.8)
SODIUM SERPL-SCNC: 138 MMOL/L (ref 136–145)
WBC NRBC COR # BLD AUTO: 8.61 10*3/MM3 (ref 3.4–10.8)

## 2025-08-12 PROCEDURE — 96365 THER/PROPH/DIAG IV INF INIT: CPT

## 2025-08-12 PROCEDURE — 80053 COMPREHEN METABOLIC PANEL: CPT

## 2025-08-12 PROCEDURE — G0378 HOSPITAL OBSERVATION PER HR: HCPCS

## 2025-08-12 PROCEDURE — 73080 X-RAY EXAM OF ELBOW: CPT

## 2025-08-12 PROCEDURE — 87186 SC STD MICRODIL/AGAR DIL: CPT

## 2025-08-12 PROCEDURE — 85652 RBC SED RATE AUTOMATED: CPT

## 2025-08-12 PROCEDURE — 87147 CULTURE TYPE IMMUNOLOGIC: CPT

## 2025-08-12 PROCEDURE — 25510000001 IOPAMIDOL PER 1 ML

## 2025-08-12 PROCEDURE — 87205 SMEAR GRAM STAIN: CPT

## 2025-08-12 PROCEDURE — 25010000002 HEPARIN (PORCINE) PER 1000 UNITS

## 2025-08-12 PROCEDURE — 83605 ASSAY OF LACTIC ACID: CPT

## 2025-08-12 PROCEDURE — 87070 CULTURE OTHR SPECIMN AEROBIC: CPT

## 2025-08-12 PROCEDURE — 36415 COLL VENOUS BLD VENIPUNCTURE: CPT

## 2025-08-12 PROCEDURE — 25010000002 VANCOMYCIN 1.75 G RECONSTITUTED SOLUTION 1 EACH VIAL

## 2025-08-12 PROCEDURE — 25810000003 SODIUM CHLORIDE 0.9 % SOLUTION 500 ML FLEX CONT

## 2025-08-12 PROCEDURE — 82948 REAGENT STRIP/BLOOD GLUCOSE: CPT

## 2025-08-12 PROCEDURE — 87040 BLOOD CULTURE FOR BACTERIA: CPT

## 2025-08-12 PROCEDURE — 83735 ASSAY OF MAGNESIUM: CPT

## 2025-08-12 PROCEDURE — 25010000002 CEFEPIME PER 500 MG

## 2025-08-12 PROCEDURE — 99222 1ST HOSP IP/OBS MODERATE 55: CPT

## 2025-08-12 PROCEDURE — 99285 EMERGENCY DEPT VISIT HI MDM: CPT | Performed by: EMERGENCY MEDICINE

## 2025-08-12 PROCEDURE — 85025 COMPLETE CBC W/AUTO DIFF WBC: CPT

## 2025-08-12 PROCEDURE — 96372 THER/PROPH/DIAG INJ SC/IM: CPT

## 2025-08-12 PROCEDURE — 73201 CT UPPER EXTREMITY W/DYE: CPT

## 2025-08-12 PROCEDURE — 86140 C-REACTIVE PROTEIN: CPT

## 2025-08-12 RX ORDER — CARVEDILOL 12.5 MG/1
12.5 TABLET ORAL 2 TIMES DAILY WITH MEALS
Status: DISCONTINUED | OUTPATIENT
Start: 2025-08-12 | End: 2025-08-14 | Stop reason: HOSPADM

## 2025-08-12 RX ORDER — DEXTROSE MONOHYDRATE 25 G/50ML
25 INJECTION, SOLUTION INTRAVENOUS
Status: DISCONTINUED | OUTPATIENT
Start: 2025-08-12 | End: 2025-08-14 | Stop reason: HOSPADM

## 2025-08-12 RX ORDER — ATORVASTATIN CALCIUM 40 MG/1
40 TABLET, FILM COATED ORAL NIGHTLY
Status: DISCONTINUED | OUTPATIENT
Start: 2025-08-12 | End: 2025-08-14 | Stop reason: HOSPADM

## 2025-08-12 RX ORDER — SODIUM CHLORIDE 0.9 % (FLUSH) 0.9 %
10 SYRINGE (ML) INJECTION AS NEEDED
Status: DISCONTINUED | OUTPATIENT
Start: 2025-08-12 | End: 2025-08-14 | Stop reason: HOSPADM

## 2025-08-12 RX ORDER — ACETAMINOPHEN 500 MG
500 TABLET ORAL EVERY 6 HOURS PRN
COMMUNITY

## 2025-08-12 RX ORDER — IOPAMIDOL 755 MG/ML
100 INJECTION, SOLUTION INTRAVASCULAR
Status: COMPLETED | OUTPATIENT
Start: 2025-08-12 | End: 2025-08-12

## 2025-08-12 RX ORDER — SODIUM CHLORIDE 0.9 % (FLUSH) 0.9 %
10 SYRINGE (ML) INJECTION EVERY 12 HOURS SCHEDULED
Status: DISCONTINUED | OUTPATIENT
Start: 2025-08-12 | End: 2025-08-14 | Stop reason: HOSPADM

## 2025-08-12 RX ORDER — GLUCAGON 1 MG/ML
1 KIT INJECTION
Status: DISCONTINUED | OUTPATIENT
Start: 2025-08-12 | End: 2025-08-14 | Stop reason: HOSPADM

## 2025-08-12 RX ORDER — CLOPIDOGREL BISULFATE 75 MG/1
75 TABLET ORAL DAILY
Status: DISCONTINUED | OUTPATIENT
Start: 2025-08-13 | End: 2025-08-14 | Stop reason: HOSPADM

## 2025-08-12 RX ORDER — HEPARIN SODIUM 5000 [USP'U]/ML
5000 INJECTION, SOLUTION INTRAVENOUS; SUBCUTANEOUS EVERY 8 HOURS SCHEDULED
Status: DISCONTINUED | OUTPATIENT
Start: 2025-08-12 | End: 2025-08-14 | Stop reason: HOSPADM

## 2025-08-12 RX ORDER — ONDANSETRON 4 MG/1
4 TABLET, ORALLY DISINTEGRATING ORAL EVERY 6 HOURS PRN
Status: DISCONTINUED | OUTPATIENT
Start: 2025-08-12 | End: 2025-08-14 | Stop reason: HOSPADM

## 2025-08-12 RX ORDER — INSULIN LISPRO 100 [IU]/ML
2-9 INJECTION, SOLUTION INTRAVENOUS; SUBCUTANEOUS
Status: DISCONTINUED | OUTPATIENT
Start: 2025-08-12 | End: 2025-08-14 | Stop reason: HOSPADM

## 2025-08-12 RX ORDER — SODIUM CHLORIDE 9 MG/ML
40 INJECTION, SOLUTION INTRAVENOUS AS NEEDED
Status: DISCONTINUED | OUTPATIENT
Start: 2025-08-12 | End: 2025-08-14 | Stop reason: HOSPADM

## 2025-08-12 RX ORDER — CALCIUM CARBONATE 500 MG/1
2 TABLET, CHEWABLE ORAL 2 TIMES DAILY PRN
Status: DISCONTINUED | OUTPATIENT
Start: 2025-08-12 | End: 2025-08-14 | Stop reason: HOSPADM

## 2025-08-12 RX ORDER — NICOTINE POLACRILEX 4 MG
15 LOZENGE BUCCAL
Status: DISCONTINUED | OUTPATIENT
Start: 2025-08-12 | End: 2025-08-14 | Stop reason: HOSPADM

## 2025-08-12 RX ORDER — KETOROLAC TROMETHAMINE 30 MG/ML
15 INJECTION, SOLUTION INTRAMUSCULAR; INTRAVENOUS EVERY 6 HOURS PRN
Status: DISPENSED | OUTPATIENT
Start: 2025-08-12 | End: 2025-08-13

## 2025-08-12 RX ORDER — IBUPROFEN 800 MG/1
800 TABLET, FILM COATED ORAL EVERY 6 HOURS PRN
COMMUNITY

## 2025-08-12 RX ORDER — ACETAMINOPHEN 325 MG/1
650 TABLET ORAL EVERY 4 HOURS PRN
Status: DISCONTINUED | OUTPATIENT
Start: 2025-08-12 | End: 2025-08-14 | Stop reason: HOSPADM

## 2025-08-12 RX ORDER — HYDROCODONE BITARTRATE AND ACETAMINOPHEN 5; 325 MG/1; MG/1
1 TABLET ORAL EVERY 4 HOURS PRN
Refills: 0 | Status: DISCONTINUED | OUTPATIENT
Start: 2025-08-12 | End: 2025-08-14 | Stop reason: HOSPADM

## 2025-08-12 RX ADMIN — Medication 10 ML: at 22:13

## 2025-08-12 RX ADMIN — ATORVASTATIN CALCIUM 40 MG: 40 TABLET, FILM COATED ORAL at 22:09

## 2025-08-12 RX ADMIN — HEPARIN SODIUM 5000 UNITS: 5000 INJECTION INTRAVENOUS; SUBCUTANEOUS at 22:09

## 2025-08-12 RX ADMIN — SODIUM CHLORIDE 2000 MG: 9 INJECTION, SOLUTION INTRAVENOUS at 18:10

## 2025-08-12 RX ADMIN — IOPAMIDOL 100 ML: 755 INJECTION, SOLUTION INTRAVENOUS at 21:38

## 2025-08-12 RX ADMIN — VANCOMYCIN HYDROCHLORIDE 1750 MG: 1.75 INJECTION, POWDER, LYOPHILIZED, FOR SOLUTION INTRAVENOUS at 18:54

## 2025-08-12 RX ADMIN — HYDROCODONE BITARTRATE AND ACETAMINOPHEN 1 TABLET: 5; 325 TABLET ORAL at 22:17

## 2025-08-12 RX ADMIN — CARVEDILOL 12.5 MG: 12.5 TABLET, FILM COATED ORAL at 22:09

## 2025-08-13 ENCOUNTER — APPOINTMENT (OUTPATIENT)
Dept: MRI IMAGING | Facility: HOSPITAL | Age: 50
End: 2025-08-13
Payer: COMMERCIAL

## 2025-08-13 ENCOUNTER — APPOINTMENT (OUTPATIENT)
Dept: ULTRASOUND IMAGING | Facility: HOSPITAL | Age: 50
End: 2025-08-13
Payer: COMMERCIAL

## 2025-08-13 LAB
ALBUMIN SERPL-MCNC: 3.5 G/DL (ref 3.5–5.2)
ALBUMIN/GLOB SERPL: 1.5 G/DL
ALP SERPL-CCNC: 44 U/L (ref 39–117)
ALT SERPL W P-5'-P-CCNC: 19 U/L (ref 1–41)
ANION GAP SERPL CALCULATED.3IONS-SCNC: 11 MMOL/L (ref 5–15)
AST SERPL-CCNC: 32 U/L (ref 1–40)
BILIRUB SERPL-MCNC: 0.3 MG/DL (ref 0–1.2)
BUN SERPL-MCNC: 11.9 MG/DL (ref 6–20)
BUN/CREAT SERPL: 11.9 (ref 7–25)
CALCIUM SPEC-SCNC: 8.5 MG/DL (ref 8.6–10.5)
CHLORIDE SERPL-SCNC: 104 MMOL/L (ref 98–107)
CO2 SERPL-SCNC: 21 MMOL/L (ref 22–29)
CREAT SERPL-MCNC: 1 MG/DL (ref 0.76–1.27)
EGFRCR SERPLBLD CKD-EPI 2021: 91.1 ML/MIN/1.73
GLOBULIN UR ELPH-MCNC: 2.4 GM/DL
GLUCOSE BLDC GLUCOMTR-MCNC: 108 MG/DL (ref 70–130)
GLUCOSE BLDC GLUCOMTR-MCNC: 120 MG/DL (ref 70–130)
GLUCOSE BLDC GLUCOMTR-MCNC: 184 MG/DL (ref 70–130)
GLUCOSE BLDC GLUCOMTR-MCNC: 93 MG/DL (ref 70–130)
GLUCOSE SERPL-MCNC: 133 MG/DL (ref 65–99)
POTASSIUM SERPL-SCNC: 3.6 MMOL/L (ref 3.5–5.2)
POTASSIUM SERPL-SCNC: 4.3 MMOL/L (ref 3.5–5.2)
PROT SERPL-MCNC: 5.9 G/DL (ref 6–8.5)
SODIUM SERPL-SCNC: 136 MMOL/L (ref 136–145)

## 2025-08-13 PROCEDURE — 96367 TX/PROPH/DG ADDL SEQ IV INF: CPT

## 2025-08-13 PROCEDURE — 93971 EXTREMITY STUDY: CPT

## 2025-08-13 PROCEDURE — 25010000002 LIDOCAINE 1 % SOLUTION

## 2025-08-13 PROCEDURE — 63710000001 INSULIN LISPRO (HUMAN) PER 5 UNITS

## 2025-08-13 PROCEDURE — 99232 SBSQ HOSP IP/OBS MODERATE 35: CPT

## 2025-08-13 PROCEDURE — 96375 TX/PRO/DX INJ NEW DRUG ADDON: CPT

## 2025-08-13 PROCEDURE — 25010000002 CEFEPIME PER 500 MG

## 2025-08-13 PROCEDURE — 25010000002 HEPARIN (PORCINE) PER 1000 UNITS

## 2025-08-13 PROCEDURE — G0378 HOSPITAL OBSERVATION PER HR: HCPCS

## 2025-08-13 PROCEDURE — 96366 THER/PROPH/DIAG IV INF ADDON: CPT

## 2025-08-13 PROCEDURE — 76999 ECHO EXAMINATION PROCEDURE: CPT

## 2025-08-13 PROCEDURE — 73223 MRI JOINT UPR EXTR W/O&W/DYE: CPT

## 2025-08-13 PROCEDURE — 94799 UNLISTED PULMONARY SVC/PX: CPT

## 2025-08-13 PROCEDURE — 25010000002 VANCOMYCIN HCL 1.25 G RECONSTITUTED SOLUTION 1 EACH VIAL

## 2025-08-13 PROCEDURE — 80053 COMPREHEN METABOLIC PANEL: CPT

## 2025-08-13 PROCEDURE — 84132 ASSAY OF SERUM POTASSIUM: CPT

## 2025-08-13 PROCEDURE — 25010000002 KETOROLAC TROMETHAMINE PER 15 MG

## 2025-08-13 PROCEDURE — 82948 REAGENT STRIP/BLOOD GLUCOSE: CPT

## 2025-08-13 PROCEDURE — 25510000001 GADOPICLENOL 0.5 MMOL/ML SOLUTION

## 2025-08-13 PROCEDURE — 25810000003 SODIUM CHLORIDE 0.9 % SOLUTION 250 ML FLEX CONT

## 2025-08-13 PROCEDURE — 96372 THER/PROPH/DIAG INJ SC/IM: CPT

## 2025-08-13 PROCEDURE — A9573 GADOPICLENOL 0.5 MMOL/ML SOLUTION: HCPCS

## 2025-08-13 RX ORDER — LIDOCAINE HYDROCHLORIDE 10 MG/ML
INJECTION, SOLUTION INFILTRATION; PERINEURAL
Status: COMPLETED
Start: 2025-08-13 | End: 2025-08-13

## 2025-08-13 RX ORDER — POTASSIUM CHLORIDE 1500 MG/1
40 TABLET, EXTENDED RELEASE ORAL EVERY 4 HOURS
Status: COMPLETED | OUTPATIENT
Start: 2025-08-13 | End: 2025-08-13

## 2025-08-13 RX ORDER — LORAZEPAM 0.5 MG/1
0.5 TABLET ORAL ONCE
Status: DISCONTINUED | OUTPATIENT
Start: 2025-08-13 | End: 2025-08-14 | Stop reason: HOSPADM

## 2025-08-13 RX ORDER — LIDOCAINE HYDROCHLORIDE 10 MG/ML
10 INJECTION, SOLUTION EPIDURAL; INFILTRATION; INTRACAUDAL; PERINEURAL ONCE
Status: DISCONTINUED | OUTPATIENT
Start: 2025-08-13 | End: 2025-08-14 | Stop reason: HOSPADM

## 2025-08-13 RX ADMIN — POTASSIUM CHLORIDE 40 MEQ: 1500 TABLET, EXTENDED RELEASE ORAL at 10:14

## 2025-08-13 RX ADMIN — Medication 10 ML: at 10:15

## 2025-08-13 RX ADMIN — SODIUM CHLORIDE 2000 MG: 9 INJECTION, SOLUTION INTRAVENOUS at 20:19

## 2025-08-13 RX ADMIN — CARVEDILOL 12.5 MG: 12.5 TABLET, FILM COATED ORAL at 10:14

## 2025-08-13 RX ADMIN — VANCOMYCIN HYDROCHLORIDE 1250 MG: 1.25 INJECTION, POWDER, LYOPHILIZED, FOR SOLUTION INTRAVENOUS at 10:54

## 2025-08-13 RX ADMIN — INSULIN LISPRO 2 UNITS: 100 INJECTION, SOLUTION INTRAVENOUS; SUBCUTANEOUS at 23:03

## 2025-08-13 RX ADMIN — POTASSIUM CHLORIDE 40 MEQ: 1500 TABLET, EXTENDED RELEASE ORAL at 14:46

## 2025-08-13 RX ADMIN — HEPARIN SODIUM 5000 UNITS: 5000 INJECTION INTRAVENOUS; SUBCUTANEOUS at 23:04

## 2025-08-13 RX ADMIN — HEPARIN SODIUM 5000 UNITS: 5000 INJECTION INTRAVENOUS; SUBCUTANEOUS at 14:46

## 2025-08-13 RX ADMIN — KETOROLAC TROMETHAMINE 15 MG: 30 INJECTION, SOLUTION INTRAMUSCULAR; INTRAVENOUS at 23:09

## 2025-08-13 RX ADMIN — ATORVASTATIN CALCIUM 40 MG: 40 TABLET, FILM COATED ORAL at 20:11

## 2025-08-13 RX ADMIN — GADOPICLENOL 7 ML: 485.1 INJECTION INTRAVENOUS at 14:25

## 2025-08-13 RX ADMIN — Medication 10 ML: at 20:20

## 2025-08-13 RX ADMIN — HEPARIN SODIUM 5000 UNITS: 5000 INJECTION INTRAVENOUS; SUBCUTANEOUS at 06:10

## 2025-08-13 RX ADMIN — VANCOMYCIN HYDROCHLORIDE 1250 MG: 1.25 INJECTION, POWDER, LYOPHILIZED, FOR SOLUTION INTRAVENOUS at 22:58

## 2025-08-13 RX ADMIN — SODIUM CHLORIDE 2000 MG: 9 INJECTION, SOLUTION INTRAVENOUS at 10:14

## 2025-08-13 RX ADMIN — HYDROCODONE BITARTRATE AND ACETAMINOPHEN 1 TABLET: 5; 325 TABLET ORAL at 23:10

## 2025-08-13 RX ADMIN — HYDROCODONE BITARTRATE AND ACETAMINOPHEN 1 TABLET: 5; 325 TABLET ORAL at 18:27

## 2025-08-13 RX ADMIN — CARVEDILOL 12.5 MG: 12.5 TABLET, FILM COATED ORAL at 18:24

## 2025-08-13 RX ADMIN — LIDOCAINE HYDROCHLORIDE 20 ML: 10 INJECTION, SOLUTION INFILTRATION; PERINEURAL at 17:21

## 2025-08-14 ENCOUNTER — READMISSION MANAGEMENT (OUTPATIENT)
Dept: CALL CENTER | Facility: HOSPITAL | Age: 50
End: 2025-08-14
Payer: COMMERCIAL

## 2025-08-14 VITALS
HEART RATE: 68 BPM | BODY MASS INDEX: 26.8 KG/M2 | RESPIRATION RATE: 20 BRPM | DIASTOLIC BLOOD PRESSURE: 67 MMHG | SYSTOLIC BLOOD PRESSURE: 114 MMHG | TEMPERATURE: 97.6 F | HEIGHT: 73 IN | WEIGHT: 202.2 LBS | OXYGEN SATURATION: 95 %

## 2025-08-14 LAB
ANION GAP SERPL CALCULATED.3IONS-SCNC: 9.2 MMOL/L (ref 5–15)
BASOPHILS # BLD AUTO: 0.03 10*3/MM3 (ref 0–0.2)
BASOPHILS NFR BLD AUTO: 0.6 % (ref 0–1.5)
BUN SERPL-MCNC: 12.8 MG/DL (ref 6–20)
BUN/CREAT SERPL: 15.2 (ref 7–25)
CALCIUM SPEC-SCNC: 9 MG/DL (ref 8.6–10.5)
CHLORIDE SERPL-SCNC: 107 MMOL/L (ref 98–107)
CO2 SERPL-SCNC: 21.8 MMOL/L (ref 22–29)
CREAT SERPL-MCNC: 0.84 MG/DL (ref 0.76–1.27)
DEPRECATED RDW RBC AUTO: 44 FL (ref 37–54)
EGFRCR SERPLBLD CKD-EPI 2021: 105.6 ML/MIN/1.73
EOSINOPHIL # BLD AUTO: 0.26 10*3/MM3 (ref 0–0.4)
EOSINOPHIL NFR BLD AUTO: 4.9 % (ref 0.3–6.2)
ERYTHROCYTE [DISTWIDTH] IN BLOOD BY AUTOMATED COUNT: 12.6 % (ref 12.3–15.4)
GLUCOSE BLDC GLUCOMTR-MCNC: 101 MG/DL (ref 70–130)
GLUCOSE BLDC GLUCOMTR-MCNC: 119 MG/DL (ref 70–130)
GLUCOSE BLDC GLUCOMTR-MCNC: 128 MG/DL (ref 70–130)
GLUCOSE SERPL-MCNC: 123 MG/DL (ref 65–99)
HCT VFR BLD AUTO: 40.5 % (ref 37.5–51)
HGB BLD-MCNC: 13.9 G/DL (ref 13–17.7)
IMM GRANULOCYTES # BLD AUTO: 0.01 10*3/MM3 (ref 0–0.05)
IMM GRANULOCYTES NFR BLD AUTO: 0.2 % (ref 0–0.5)
LYMPHOCYTES # BLD AUTO: 1.78 10*3/MM3 (ref 0.7–3.1)
LYMPHOCYTES NFR BLD AUTO: 33.8 % (ref 19.6–45.3)
MCH RBC QN AUTO: 32.2 PG (ref 26.6–33)
MCHC RBC AUTO-ENTMCNC: 34.3 G/DL (ref 31.5–35.7)
MCV RBC AUTO: 93.8 FL (ref 79–97)
MONOCYTES # BLD AUTO: 0.67 10*3/MM3 (ref 0.1–0.9)
MONOCYTES NFR BLD AUTO: 12.7 % (ref 5–12)
NEUTROPHILS NFR BLD AUTO: 2.52 10*3/MM3 (ref 1.7–7)
NEUTROPHILS NFR BLD AUTO: 47.8 % (ref 42.7–76)
PLATELET # BLD AUTO: 265 10*3/MM3 (ref 140–450)
PMV BLD AUTO: 8.9 FL (ref 6–12)
POTASSIUM SERPL-SCNC: 4 MMOL/L (ref 3.5–5.2)
RBC # BLD AUTO: 4.32 10*6/MM3 (ref 4.14–5.8)
SODIUM SERPL-SCNC: 138 MMOL/L (ref 136–145)
VANCOMYCIN SERPL-MCNC: 10.8 MCG/ML (ref 5–40)
WBC NRBC COR # BLD AUTO: 5.27 10*3/MM3 (ref 3.4–10.8)

## 2025-08-14 PROCEDURE — 80048 BASIC METABOLIC PNL TOTAL CA: CPT | Performed by: NURSE PRACTITIONER

## 2025-08-14 PROCEDURE — G0378 HOSPITAL OBSERVATION PER HR: HCPCS

## 2025-08-14 PROCEDURE — 25010000002 CEFEPIME PER 500 MG

## 2025-08-14 PROCEDURE — 96366 THER/PROPH/DIAG IV INF ADDON: CPT

## 2025-08-14 PROCEDURE — 80202 ASSAY OF VANCOMYCIN: CPT

## 2025-08-14 PROCEDURE — 99238 HOSP IP/OBS DSCHRG MGMT 30/<: CPT

## 2025-08-14 PROCEDURE — 25010000002 HEPARIN (PORCINE) PER 1000 UNITS

## 2025-08-14 PROCEDURE — 96372 THER/PROPH/DIAG INJ SC/IM: CPT

## 2025-08-14 PROCEDURE — 82948 REAGENT STRIP/BLOOD GLUCOSE: CPT

## 2025-08-14 PROCEDURE — 85025 COMPLETE CBC W/AUTO DIFF WBC: CPT | Performed by: NURSE PRACTITIONER

## 2025-08-14 PROCEDURE — 25810000003 SODIUM CHLORIDE 0.9 % SOLUTION 250 ML FLEX CONT

## 2025-08-14 PROCEDURE — 25010000002 VANCOMYCIN HCL 1.25 G RECONSTITUTED SOLUTION 1 EACH VIAL

## 2025-08-14 RX ORDER — DOXYCYCLINE 100 MG/1
100 CAPSULE ORAL 2 TIMES DAILY
Qty: 14 CAPSULE | Refills: 0 | Status: SHIPPED | OUTPATIENT
Start: 2025-08-14 | End: 2025-08-21

## 2025-08-14 RX ORDER — POLYETHYLENE GLYCOL 3350 17 G/17G
17 POWDER, FOR SOLUTION ORAL DAILY
Status: DISCONTINUED | OUTPATIENT
Start: 2025-08-14 | End: 2025-08-14 | Stop reason: HOSPADM

## 2025-08-14 RX ADMIN — HYDROCODONE BITARTRATE AND ACETAMINOPHEN 1 TABLET: 5; 325 TABLET ORAL at 15:43

## 2025-08-14 RX ADMIN — HEPARIN SODIUM 5000 UNITS: 5000 INJECTION INTRAVENOUS; SUBCUTANEOUS at 15:10

## 2025-08-14 RX ADMIN — HEPARIN SODIUM 5000 UNITS: 5000 INJECTION INTRAVENOUS; SUBCUTANEOUS at 06:17

## 2025-08-14 RX ADMIN — POLYETHYLENE GLYCOL 3350 17 G: 17 POWDER, FOR SOLUTION ORAL at 11:03

## 2025-08-14 RX ADMIN — VANCOMYCIN HYDROCHLORIDE 1250 MG: 1.25 INJECTION, POWDER, LYOPHILIZED, FOR SOLUTION INTRAVENOUS at 11:29

## 2025-08-14 RX ADMIN — Medication 10 ML: at 09:06

## 2025-08-14 RX ADMIN — CARVEDILOL 12.5 MG: 12.5 TABLET, FILM COATED ORAL at 18:06

## 2025-08-14 RX ADMIN — SODIUM CHLORIDE 2000 MG: 9 INJECTION, SOLUTION INTRAVENOUS at 04:09

## 2025-08-14 RX ADMIN — CARVEDILOL 12.5 MG: 12.5 TABLET, FILM COATED ORAL at 09:05

## 2025-08-15 ENCOUNTER — TRANSITIONAL CARE MANAGEMENT TELEPHONE ENCOUNTER (OUTPATIENT)
Dept: CALL CENTER | Facility: HOSPITAL | Age: 50
End: 2025-08-15
Payer: COMMERCIAL

## 2025-08-15 LAB
BACTERIA SPEC AEROBE CULT: ABNORMAL
GRAM STN SPEC: ABNORMAL
GRAM STN SPEC: ABNORMAL

## 2025-08-17 LAB
BACTERIA SPEC AEROBE CULT: NORMAL
BACTERIA SPEC AEROBE CULT: NORMAL

## 2025-08-18 ENCOUNTER — TELEPHONE (OUTPATIENT)
Dept: ORTHOPEDIC SURGERY | Facility: CLINIC | Age: 50
End: 2025-08-18
Payer: COMMERCIAL

## 2025-08-18 ENCOUNTER — OFFICE VISIT (OUTPATIENT)
Dept: INTERNAL MEDICINE | Facility: CLINIC | Age: 50
End: 2025-08-18
Payer: COMMERCIAL

## 2025-08-18 VITALS
BODY MASS INDEX: 26.65 KG/M2 | HEART RATE: 74 BPM | TEMPERATURE: 98 F | OXYGEN SATURATION: 96 % | WEIGHT: 202 LBS | DIASTOLIC BLOOD PRESSURE: 76 MMHG | SYSTOLIC BLOOD PRESSURE: 106 MMHG

## 2025-08-18 DIAGNOSIS — L03.113 CELLULITIS OF RIGHT ELBOW: ICD-10-CM

## 2025-08-18 DIAGNOSIS — L02.414 ABSCESS OF LEFT ARM: Primary | ICD-10-CM

## 2025-08-18 RX ORDER — CARVEDILOL 12.5 MG/1
12.5 TABLET ORAL 2 TIMES DAILY WITH MEALS
COMMUNITY
Start: 2025-07-21

## 2025-08-18 RX ORDER — HYDROCODONE BITARTRATE AND ACETAMINOPHEN 5; 325 MG/1; MG/1
1 TABLET ORAL EVERY 4 HOURS PRN
Qty: 18 TABLET | Refills: 0 | Status: SHIPPED | OUTPATIENT
Start: 2025-08-18

## 2025-08-29 ENCOUNTER — READMISSION MANAGEMENT (OUTPATIENT)
Dept: CALL CENTER | Facility: HOSPITAL | Age: 50
End: 2025-08-29
Payer: COMMERCIAL

## (undated) DEVICE — KT MANIFLD CARDIAC

## (undated) DEVICE — RUNTHROUGH NS EXTRA FLOPPY PTCA GUIDEWIRE: Brand: RUNTHROUGH

## (undated) DEVICE — CATH DIAG IMPULSE FL3.5 5F 100CM

## (undated) DEVICE — DEV INDEFLATOR P/N 580289

## (undated) DEVICE — NC TREK CORONARY DILATATION CATHETER 5.0 MM X 12 MM / RAPID-EXCHANGE: Brand: NC TREK

## (undated) DEVICE — CATH ASPIR EXPORTADVANCE 6F .014IN 140CM

## (undated) DEVICE — GW EMR FIX EXCHG J STD .035 3MM 260CM

## (undated) DEVICE — NC TREK™ CORONARY DILATATION CATHETER 4.5 MM X 15 MM / RAPID-EXCHANGE: Brand: NC TREK™

## (undated) DEVICE — TREK CORONARY DILATATION CATHETER 3.50 MM X 12 MM / RAPID-EXCHANGE: Brand: TREK

## (undated) DEVICE — 6F .070 JR 4 100CM: Brand: CORDIS

## (undated) DEVICE — CATH IMG DRAGONFLY OPTIS 2.7F 135CM

## (undated) DEVICE — CATH VENT MIV RADL PIG ST TIP 5F 110CM

## (undated) DEVICE — PK CATH CARD 40

## (undated) DEVICE — GLIDESHEATH SLENDER STAINLESS STEEL KIT: Brand: GLIDESHEATH SLENDER

## (undated) DEVICE — TREK CORONARY DILATATION CATHETER 4.0 MM X 12 MM / RAPID-EXCHANGE: Brand: TREK